# Patient Record
Sex: FEMALE | Race: WHITE | Employment: UNEMPLOYED | URBAN - METROPOLITAN AREA
[De-identification: names, ages, dates, MRNs, and addresses within clinical notes are randomized per-mention and may not be internally consistent; named-entity substitution may affect disease eponyms.]

---

## 2021-11-19 ENCOUNTER — APPOINTMENT (OUTPATIENT)
Dept: GENERAL RADIOLOGY | Age: 61
DRG: 493 | End: 2021-11-19
Payer: COMMERCIAL

## 2021-11-19 ENCOUNTER — HOSPITAL ENCOUNTER (INPATIENT)
Age: 61
LOS: 3 days | Discharge: HOME HEALTH | DRG: 493 | End: 2021-11-22
Attending: INTERNAL MEDICINE | Admitting: INTERNAL MEDICINE
Payer: COMMERCIAL

## 2021-11-19 DIAGNOSIS — S42.202A CLOSED FRACTURE OF PROXIMAL END OF LEFT HUMERUS, UNSPECIFIED FRACTURE MORPHOLOGY, INITIAL ENCOUNTER: Primary | ICD-10-CM

## 2021-11-19 PROBLEM — M25.519 SHOULDER PAIN: Status: ACTIVE | Noted: 2021-11-19

## 2021-11-19 LAB
ABO/RH: NORMAL
ANION GAP SERPL CALCULATED.3IONS-SCNC: 14 MMOL/L (ref 3–16)
ANTIBODY SCREEN: NORMAL
BACTERIA: ABNORMAL /HPF
BASOPHILS ABSOLUTE: 0.1 K/UL (ref 0–0.2)
BASOPHILS RELATIVE PERCENT: 0.3 %
BILIRUBIN URINE: ABNORMAL
BLOOD, URINE: NEGATIVE
BUN BLDV-MCNC: 16 MG/DL (ref 7–20)
CALCIUM SERPL-MCNC: 9.5 MG/DL (ref 8.3–10.6)
CHLORIDE BLD-SCNC: 97 MMOL/L (ref 99–110)
CLARITY: ABNORMAL
CO2: 22 MMOL/L (ref 21–32)
COLOR: ABNORMAL
CREAT SERPL-MCNC: 0.7 MG/DL (ref 0.6–1.2)
CRYSTALS, UA: ABNORMAL /HPF
EOSINOPHILS ABSOLUTE: 0.1 K/UL (ref 0–0.6)
EOSINOPHILS RELATIVE PERCENT: 0.3 %
EPITHELIAL CELLS, UA: 12 /HPF (ref 0–5)
FINE CASTS, UA: ABNORMAL /LPF (ref 0–2)
GFR AFRICAN AMERICAN: >60
GFR NON-AFRICAN AMERICAN: >60
GLUCOSE BLD-MCNC: 158 MG/DL (ref 70–99)
GLUCOSE BLD-MCNC: 229 MG/DL (ref 70–99)
GLUCOSE URINE: NEGATIVE MG/DL
HCT VFR BLD CALC: 48.6 % (ref 36–48)
HEMOGLOBIN: 16.4 G/DL (ref 12–16)
HYALINE CASTS: ABNORMAL /LPF (ref 0–2)
KETONES, URINE: ABNORMAL MG/DL
LEUKOCYTE ESTERASE, URINE: ABNORMAL
LYMPHOCYTES ABSOLUTE: 2.9 K/UL (ref 1–5.1)
LYMPHOCYTES RELATIVE PERCENT: 10.8 %
MCH RBC QN AUTO: 30.6 PG (ref 26–34)
MCHC RBC AUTO-ENTMCNC: 33.7 G/DL (ref 31–36)
MCV RBC AUTO: 91 FL (ref 80–100)
MICROSCOPIC EXAMINATION: YES
MONOCYTES ABSOLUTE: 1.1 K/UL (ref 0–1.3)
MONOCYTES RELATIVE PERCENT: 4.3 %
NEUTROPHILS ABSOLUTE: 22.5 K/UL (ref 1.7–7.7)
NEUTROPHILS RELATIVE PERCENT: 84.3 %
NITRITE, URINE: NEGATIVE
PDW BLD-RTO: 13.5 % (ref 12.4–15.4)
PERFORMED ON: ABNORMAL
PH UA: 5.5 (ref 5–8)
PLATELET # BLD: 399 K/UL (ref 135–450)
PMV BLD AUTO: 8 FL (ref 5–10.5)
POTASSIUM REFLEX MAGNESIUM: 4.1 MMOL/L (ref 3.5–5.1)
PROTEIN UA: 100 MG/DL
RBC # BLD: 5.34 M/UL (ref 4–5.2)
RBC UA: ABNORMAL /HPF (ref 0–4)
SARS-COV-2, NAAT: NOT DETECTED
SODIUM BLD-SCNC: 133 MMOL/L (ref 136–145)
SPECIFIC GRAVITY UA: 1.02 (ref 1–1.03)
URINE REFLEX TO CULTURE: YES
URINE TYPE: ABNORMAL
UROBILINOGEN, URINE: 1 E.U./DL
WBC # BLD: 26.6 K/UL (ref 4–11)
WBC UA: 31 /HPF (ref 0–5)

## 2021-11-19 PROCEDURE — 73060 X-RAY EXAM OF HUMERUS: CPT

## 2021-11-19 PROCEDURE — 99284 EMERGENCY DEPT VISIT MOD MDM: CPT

## 2021-11-19 PROCEDURE — 93005 ELECTROCARDIOGRAM TRACING: CPT | Performed by: PHYSICIAN ASSISTANT

## 2021-11-19 PROCEDURE — 86850 RBC ANTIBODY SCREEN: CPT

## 2021-11-19 PROCEDURE — 81001 URINALYSIS AUTO W/SCOPE: CPT

## 2021-11-19 PROCEDURE — 6360000002 HC RX W HCPCS: Performed by: INTERNAL MEDICINE

## 2021-11-19 PROCEDURE — 6370000000 HC RX 637 (ALT 250 FOR IP): Performed by: NURSE PRACTITIONER

## 2021-11-19 PROCEDURE — 71045 X-RAY EXAM CHEST 1 VIEW: CPT

## 2021-11-19 PROCEDURE — 1200000000 HC SEMI PRIVATE

## 2021-11-19 PROCEDURE — 6360000002 HC RX W HCPCS: Performed by: PHYSICIAN ASSISTANT

## 2021-11-19 PROCEDURE — 83036 HEMOGLOBIN GLYCOSYLATED A1C: CPT

## 2021-11-19 PROCEDURE — 6370000000 HC RX 637 (ALT 250 FOR IP): Performed by: PHYSICIAN ASSISTANT

## 2021-11-19 PROCEDURE — 6360000002 HC RX W HCPCS: Performed by: NURSE PRACTITIONER

## 2021-11-19 PROCEDURE — 80048 BASIC METABOLIC PNL TOTAL CA: CPT

## 2021-11-19 PROCEDURE — 96375 TX/PRO/DX INJ NEW DRUG ADDON: CPT

## 2021-11-19 PROCEDURE — 86901 BLOOD TYPING SEROLOGIC RH(D): CPT

## 2021-11-19 PROCEDURE — 87635 SARS-COV-2 COVID-19 AMP PRB: CPT

## 2021-11-19 PROCEDURE — 96374 THER/PROPH/DIAG INJ IV PUSH: CPT

## 2021-11-19 PROCEDURE — 99221 1ST HOSP IP/OBS SF/LOW 40: CPT | Performed by: NURSE PRACTITIONER

## 2021-11-19 PROCEDURE — 86900 BLOOD TYPING SEROLOGIC ABO: CPT

## 2021-11-19 PROCEDURE — 96372 THER/PROPH/DIAG INJ SC/IM: CPT

## 2021-11-19 PROCEDURE — 87086 URINE CULTURE/COLONY COUNT: CPT

## 2021-11-19 PROCEDURE — 85025 COMPLETE CBC W/AUTO DIFF WBC: CPT

## 2021-11-19 PROCEDURE — 6370000000 HC RX 637 (ALT 250 FOR IP): Performed by: INTERNAL MEDICINE

## 2021-11-19 PROCEDURE — 2580000003 HC RX 258: Performed by: INTERNAL MEDICINE

## 2021-11-19 RX ORDER — OXYCODONE HYDROCHLORIDE 10 MG/1
10 TABLET ORAL EVERY 4 HOURS PRN
Status: DISCONTINUED | OUTPATIENT
Start: 2021-11-19 | End: 2021-11-19

## 2021-11-19 RX ORDER — OXYCODONE HYDROCHLORIDE AND ACETAMINOPHEN 5; 325 MG/1; MG/1
1 TABLET ORAL ONCE
Status: DISCONTINUED | OUTPATIENT
Start: 2021-11-19 | End: 2021-11-19

## 2021-11-19 RX ORDER — SODIUM CHLORIDE 0.9 % (FLUSH) 0.9 %
10 SYRINGE (ML) INJECTION EVERY 12 HOURS SCHEDULED
Status: DISCONTINUED | OUTPATIENT
Start: 2021-11-19 | End: 2021-11-22 | Stop reason: HOSPADM

## 2021-11-19 RX ORDER — FENTANYL CITRATE 50 UG/ML
100 INJECTION, SOLUTION INTRAMUSCULAR; INTRAVENOUS ONCE
Status: COMPLETED | OUTPATIENT
Start: 2021-11-19 | End: 2021-11-19

## 2021-11-19 RX ORDER — MORPHINE SULFATE 2 MG/ML
2 INJECTION, SOLUTION INTRAMUSCULAR; INTRAVENOUS
Status: DISCONTINUED | OUTPATIENT
Start: 2021-11-19 | End: 2021-11-19 | Stop reason: SDUPTHER

## 2021-11-19 RX ORDER — OXYCODONE HYDROCHLORIDE 5 MG/1
5 TABLET ORAL EVERY 4 HOURS PRN
Status: DISCONTINUED | OUTPATIENT
Start: 2021-11-19 | End: 2021-11-22 | Stop reason: HOSPADM

## 2021-11-19 RX ORDER — DEXTROSE MONOHYDRATE 50 MG/ML
100 INJECTION, SOLUTION INTRAVENOUS PRN
Status: DISCONTINUED | OUTPATIENT
Start: 2021-11-19 | End: 2021-11-22 | Stop reason: HOSPADM

## 2021-11-19 RX ORDER — ONDANSETRON 4 MG/1
4 TABLET, ORALLY DISINTEGRATING ORAL EVERY 8 HOURS PRN
Status: DISCONTINUED | OUTPATIENT
Start: 2021-11-19 | End: 2021-11-22 | Stop reason: HOSPADM

## 2021-11-19 RX ORDER — OXYCODONE HYDROCHLORIDE 5 MG/1
5 TABLET ORAL EVERY 4 HOURS PRN
Status: DISCONTINUED | OUTPATIENT
Start: 2021-11-19 | End: 2021-11-19

## 2021-11-19 RX ORDER — LISINOPRIL AND HYDROCHLOROTHIAZIDE 20; 12.5 MG/1; MG/1
1 TABLET ORAL DAILY
COMMUNITY

## 2021-11-19 RX ORDER — ACETAMINOPHEN 650 MG/1
650 SUPPOSITORY RECTAL EVERY 6 HOURS PRN
Status: DISCONTINUED | OUTPATIENT
Start: 2021-11-19 | End: 2021-11-22 | Stop reason: HOSPADM

## 2021-11-19 RX ORDER — ATORVASTATIN CALCIUM 20 MG/1
20 TABLET, FILM COATED ORAL NIGHTLY
Status: DISCONTINUED | OUTPATIENT
Start: 2021-11-19 | End: 2021-11-22 | Stop reason: HOSPADM

## 2021-11-19 RX ORDER — SODIUM CHLORIDE 0.9 % (FLUSH) 0.9 %
10 SYRINGE (ML) INJECTION PRN
Status: DISCONTINUED | OUTPATIENT
Start: 2021-11-19 | End: 2021-11-22 | Stop reason: HOSPADM

## 2021-11-19 RX ORDER — SIMVASTATIN 40 MG
40 TABLET ORAL NIGHTLY
COMMUNITY

## 2021-11-19 RX ORDER — MAGNESIUM SULFATE IN WATER 40 MG/ML
2000 INJECTION, SOLUTION INTRAVENOUS PRN
Status: DISCONTINUED | OUTPATIENT
Start: 2021-11-19 | End: 2021-11-22 | Stop reason: HOSPADM

## 2021-11-19 RX ORDER — SODIUM CHLORIDE 9 MG/ML
25 INJECTION, SOLUTION INTRAVENOUS PRN
Status: DISCONTINUED | OUTPATIENT
Start: 2021-11-19 | End: 2021-11-22 | Stop reason: HOSPADM

## 2021-11-19 RX ORDER — LEVOTHYROXINE SODIUM 0.15 MG/1
150 TABLET ORAL EVERY OTHER DAY
COMMUNITY

## 2021-11-19 RX ORDER — LEVOTHYROXINE SODIUM 175 UG/1
175 TABLET ORAL EVERY OTHER DAY
COMMUNITY

## 2021-11-19 RX ORDER — ONDANSETRON 4 MG/1
4 TABLET, ORALLY DISINTEGRATING ORAL ONCE
Status: COMPLETED | OUTPATIENT
Start: 2021-11-19 | End: 2021-11-19

## 2021-11-19 RX ORDER — OXYCODONE HYDROCHLORIDE 10 MG/1
10 TABLET ORAL EVERY 4 HOURS PRN
Status: DISCONTINUED | OUTPATIENT
Start: 2021-11-19 | End: 2021-11-22 | Stop reason: HOSPADM

## 2021-11-19 RX ORDER — LISINOPRIL AND HYDROCHLOROTHIAZIDE 20; 12.5 MG/1; MG/1
1 TABLET ORAL DAILY
Status: DISCONTINUED | OUTPATIENT
Start: 2021-11-20 | End: 2021-11-22 | Stop reason: HOSPADM

## 2021-11-19 RX ORDER — MORPHINE SULFATE 2 MG/ML
2 INJECTION, SOLUTION INTRAMUSCULAR; INTRAVENOUS
Status: DISCONTINUED | OUTPATIENT
Start: 2021-11-19 | End: 2021-11-22

## 2021-11-19 RX ORDER — ONDANSETRON 2 MG/ML
4 INJECTION INTRAMUSCULAR; INTRAVENOUS EVERY 6 HOURS PRN
Status: DISCONTINUED | OUTPATIENT
Start: 2021-11-19 | End: 2021-11-22 | Stop reason: HOSPADM

## 2021-11-19 RX ORDER — POTASSIUM CHLORIDE 7.45 MG/ML
10 INJECTION INTRAVENOUS PRN
Status: DISCONTINUED | OUTPATIENT
Start: 2021-11-19 | End: 2021-11-22 | Stop reason: HOSPADM

## 2021-11-19 RX ORDER — LEVOTHYROXINE SODIUM 0.07 MG/1
150 TABLET ORAL EVERY OTHER DAY
Status: DISCONTINUED | OUTPATIENT
Start: 2021-11-20 | End: 2021-11-22 | Stop reason: HOSPADM

## 2021-11-19 RX ORDER — LORAZEPAM 2 MG/ML
1 INJECTION INTRAMUSCULAR ONCE
Status: COMPLETED | OUTPATIENT
Start: 2021-11-19 | End: 2021-11-19

## 2021-11-19 RX ORDER — NICOTINE POLACRILEX 4 MG
15 LOZENGE BUCCAL PRN
Status: DISCONTINUED | OUTPATIENT
Start: 2021-11-19 | End: 2021-11-22 | Stop reason: HOSPADM

## 2021-11-19 RX ORDER — ACETAMINOPHEN 325 MG/1
650 TABLET ORAL EVERY 6 HOURS PRN
Status: DISCONTINUED | OUTPATIENT
Start: 2021-11-19 | End: 2021-11-22 | Stop reason: HOSPADM

## 2021-11-19 RX ORDER — DEXTROSE MONOHYDRATE 25 G/50ML
12.5 INJECTION, SOLUTION INTRAVENOUS PRN
Status: DISCONTINUED | OUTPATIENT
Start: 2021-11-19 | End: 2021-11-22 | Stop reason: HOSPADM

## 2021-11-19 RX ADMIN — Medication 10 ML: at 21:22

## 2021-11-19 RX ADMIN — INSULIN LISPRO 1 UNITS: 100 INJECTION, SOLUTION INTRAVENOUS; SUBCUTANEOUS at 21:40

## 2021-11-19 RX ADMIN — HYDROMORPHONE HYDROCHLORIDE 1 MG: 1 INJECTION, SOLUTION INTRAMUSCULAR; INTRAVENOUS; SUBCUTANEOUS at 16:27

## 2021-11-19 RX ADMIN — HYDROMORPHONE HYDROCHLORIDE 2 MG: 1 INJECTION, SOLUTION INTRAMUSCULAR; INTRAVENOUS; SUBCUTANEOUS at 13:31

## 2021-11-19 RX ADMIN — FENTANYL CITRATE 100 MCG: 50 INJECTION INTRAMUSCULAR; INTRAVENOUS at 15:27

## 2021-11-19 RX ADMIN — LORAZEPAM 1 MG: 2 INJECTION INTRAMUSCULAR; INTRAVENOUS at 16:50

## 2021-11-19 RX ADMIN — OXYCODONE 5 MG: 5 TABLET ORAL at 22:13

## 2021-11-19 RX ADMIN — MORPHINE SULFATE 2 MG: 2 INJECTION, SOLUTION INTRAMUSCULAR; INTRAVENOUS at 23:16

## 2021-11-19 RX ADMIN — ONDANSETRON 4 MG: 4 TABLET, ORALLY DISINTEGRATING ORAL at 13:31

## 2021-11-19 RX ADMIN — ATORVASTATIN CALCIUM 20 MG: 20 TABLET, FILM COATED ORAL at 21:22

## 2021-11-19 RX ADMIN — MORPHINE SULFATE 2 MG: 2 INJECTION, SOLUTION INTRAMUSCULAR; INTRAVENOUS at 19:30

## 2021-11-19 ASSESSMENT — PAIN SCALES - GENERAL
PAINLEVEL_OUTOF10: 8
PAINLEVEL_OUTOF10: 7
PAINLEVEL_OUTOF10: 6
PAINLEVEL_OUTOF10: 0
PAINLEVEL_OUTOF10: 7
PAINLEVEL_OUTOF10: 10
PAINLEVEL_OUTOF10: 3
PAINLEVEL_OUTOF10: 7

## 2021-11-19 ASSESSMENT — PAIN DESCRIPTION - FREQUENCY
FREQUENCY: CONTINUOUS
FREQUENCY: INTERMITTENT

## 2021-11-19 ASSESSMENT — PAIN DESCRIPTION - ORIENTATION
ORIENTATION: LEFT

## 2021-11-19 ASSESSMENT — PAIN DESCRIPTION - PAIN TYPE
TYPE: ACUTE PAIN

## 2021-11-19 ASSESSMENT — PAIN - FUNCTIONAL ASSESSMENT
PAIN_FUNCTIONAL_ASSESSMENT: PREVENTS OR INTERFERES SOME ACTIVE ACTIVITIES AND ADLS
PAIN_FUNCTIONAL_ASSESSMENT: 0-10

## 2021-11-19 ASSESSMENT — PAIN DESCRIPTION - LOCATION
LOCATION: SHOULDER
LOCATION: ARM
LOCATION: SHOULDER
LOCATION: ARM

## 2021-11-19 ASSESSMENT — PAIN DESCRIPTION - PROGRESSION: CLINICAL_PROGRESSION: NOT CHANGED

## 2021-11-19 ASSESSMENT — PAIN DESCRIPTION - DESCRIPTORS
DESCRIPTORS: SHARP;SPASM
DESCRIPTORS: SHARP;SHOOTING;ACHING

## 2021-11-19 NOTE — ED PROVIDER NOTES
[facility]        Pt Name: Dasia Webb  MRN: [id number]  [place] 1960  Date of evaluation: 11/19/2021  Provider: Lovett Cooks, PA  PCP: No primary care provider on file. Note Started: 1:24 PM EST     The ED Attending Physician was available for consultation but did not see or evaluate this patient. CHIEF COMPLAINT       Chief Complaint   Patient presents with    Shoulder Injury     fell when her dog ran off; denies hitting her head       HISTORY OF PRESENT ILLNESS   (Location, Timing/Onset, Context/Setting, Quality, Duration, Modifying Factors, Severity, Associated Signs and Symptoms)  Note limiting factors. Chief Complaint: Left shoulder pain    Dasia Webb is a 64 y.o. female who presents with injury to the left shoulder area. She says she was walking her dog shortly before she came to the emergency department when the dog jerked, and she fell to the ground, landing on her left elbow and shoulder area. Denies any head trauma or loss of consciousness. She says she was in severe pain immediately, mostly in the left upper arm. Says she does not know whether she broke her collarbone or shoulder but it feels like something is broken. She denies numbness in the extremity. Denies injuries to any other parts of the body. She says it hurts to even try to move the left arm. No significant pain in the elbow or wrist.  Denies any prior history of fracture or surgery in the affected area. Nursing Notes were all reviewed and agreed with or any disagreements were addressed in the HPI. REVIEW OF SYSTEMS    (2-9 systems for level 4, 10 or more for level 5)     Review of Systems    Positives and pertinent negatives as per HPI. PAST MEDICAL HISTORY   History reviewed. No pertinent past medical history. SURGICAL HISTORY   History reviewed. No pertinent surgical history.     CURRENTMEDICATIONS       Previous Medications    LEVOTHYROXINE (SYNTHROID) 150 MCG TABLET    Take 150 mcg by mouth every other day Alternates with 175 mcg    LEVOTHYROXINE (SYNTHROID) 175 MCG TABLET    Take 175 mcg by mouth every other day Alternates with 150 mcg    LISINOPRIL-HYDROCHLOROTHIAZIDE (PRINZIDE;ZESTORETIC) 20-12.5 MG PER TABLET    Take 1 tablet by mouth daily    SEMAGLUTIDE 3 MG TABS    Take 3 mg by mouth daily    SIMVASTATIN (ZOCOR) 40 MG TABLET    Take 40 mg by mouth nightly       ALLERGIES     Cyclosporine    FAMILYHISTORY     History reviewed. No pertinent family history. SOCIAL HISTORY       Social History     Tobacco Use    Smoking status: Current Every Day Smoker    Smokeless tobacco: Never Used   Substance Use Topics    Alcohol use: Yes     Comment: occasionally     Drug use: Never       SCREENINGS           PHYSICAL EXAM    (up to 7 for level 4, 8 or more for level 5)     ED Triage Vitals [11/19/21 1246]   BP Temp Temp Source Pulse Resp SpO2 Height Weight   -- 97.5 °F (36.4 °C) Oral 81 20 93 % -- --       Physical Exam  Vitals and nursing note reviewed. Constitutional:       General: She is not in acute distress. Appearance: Normal appearance. She is not ill-appearing. HENT:      Head: Normocephalic and atraumatic. Nose: Nose normal.   Eyes:      General:         Right eye: No discharge. Left eye: No discharge. Pulmonary:      Effort: Pulmonary effort is normal. No respiratory distress. Musculoskeletal:         General: Normal range of motion. Cervical back: Normal range of motion. Comments: Presents with left arm in a sling. No tenderness directly over the left clavicle, scapula or AC joint. There is tenderness to palpation diffusely over the humerus. Palpation of the bones of the elbow resulted in pain in the upper arm. Pain reported with any attempts at range of motion to the left elbow or shoulder.   Normal examination of the left wrist and hand, with no bony tenderness there, good range of motion to the joints. 2+ radial pulse on the left. Sensation to light touch grossly intact and capillary refill <3 seconds in the digits of the left upper extremity. Skin:     General: Skin is warm and dry. Neurological:      General: No focal deficit present. Mental Status: She is alert and oriented to person, place, and time.    Psychiatric:         Mood and Affect: Mood normal.         Behavior: Behavior normal.         DIAGNOSTIC RESULTS   LABS:    Labs Reviewed   CBC WITH AUTO DIFFERENTIAL - Abnormal; Notable for the following components:       Result Value    WBC 26.6 (*)     RBC 5.34 (*)     Hemoglobin 16.4 (*)     Hematocrit 48.6 (*)     Neutrophils Absolute 22.5 (*)     All other components within normal limits    Narrative:     Performed at:  [address] [place]   Phone [phone]   BASIC METABOLIC PANEL W/ REFLEX TO MG FOR LOW K - Abnormal; Notable for the following components:    Sodium 133 (*)     Chloride 97 (*)     Glucose 229 (*)     All other components within normal limits    Narrative:     Performed at:  [address] [address]   Phone [phone]   URINE RT REFLEX TO CULTURE - Abnormal; Notable for the following components:    Clarity, UA CLOUDY (*)     Bilirubin Urine SMALL (*)     Ketones, Urine TRACE (*)     Protein,  (*)     Leukocyte Esterase, Urine SMALL (*)     All other components within normal limits    Narrative:     Performed at:  [address] [address]   Phone [phone] RAPID    Narrative:     Performed at:  [address] [place]   Phone [phone]   MICROSCOPIC URINALYSIS   TYPE AND SCREEN    Narrative:     Performed at:  [address] [place] [address]  [address] [facility] ([facility] Benjy Galaviz [address]   Phone [phone]       When ordered only abnormal lab results are displayed. All other labs were within normal range or not returned as of this dictation. EKG: When ordered, EKG's are interpreted by the Emergency Department Physician in the absence of a cardiologist.  Please see their note for interpretation of EKG. RADIOLOGY:   Non-plain film images such as CT, Ultrasound and MRI are read by the radiologist. Plain radiographic images are visualized and preliminarily interpreted by the ED Provider with the below findings:    Interpretation per the Radiologist below, if available at the time of this note:    XR CHEST PORTABLE   Final Result   Exam limited by body habitus. Possible small pleural effusions. PA and   lateral chest radiograph could be performed for further assessment as   warranted. XR HUMERUS LEFT (MIN 2 VIEWS)   Final Result   Comminuted fracture of the proximal left humeral shaft as well as   glenohumeral dislocation. CONSULTS:  [address] [place] [address]   Unless otherwise noted below, none.      Procedures    EMERGENCY DEPARTMENT COURSE and DIFFERENTIAL DIAGNOSIS/MDM:   Vitals:    Vitals:    11/19/21 1246 11/19/21 1410 11/19/21 1555 11/19/21 1901   BP:  (!) 148/94  138/81   Pulse: 81 80  80   Resp: 20 20  16   Temp: 97.5 °F (36.4 °C) 97.8 °F (36.6 °C)  97.9 °F (36.6 °C)   TempSrc: Oral Oral  Oral   SpO2: 93% 95%  95%   Weight:   259 lb (117.5 kg)    Height:   5' 1.5\" (1.562 m)        Patient was given the following medications:  Medications   morphine (PF) injection 2 mg (2 mg IntraVENous Given 11/19/21 1930)   ondansetron (ZOFRAN-ODT) disintegrating tablet 4 mg (4 mg Oral Given 11/19/21 1331)   HYDROmorphone (DILAUDID) injection 2 mg (2 mg IntraMUSCular Given 11/19/21 1331)   fentaNYL (SUBLIMAZE) injection 100 mcg (100 mcg IntraVENous Given 11/19/21 1527)   HYDROmorphone (DILAUDID) injection 1 mg (1 mg IntraVENous Given 11/19/21 1627)   LORazepam (ATIVAN) injection 1 mg (1 mg IntraVENous Given 11/19/21 1650)           Patient had good neurovascular status in the arm, but x-ray revealed a badly angulated and displaced fracture of the proximal humerus, with dislocation of the humeral head from the joint. I consulted orthopedics, the patient was visited by the orthopedic NP Florentino velazquez, who consulted with the orthopedic physician on-call. At his recommendation, an attempt at reduction of the fracture fragment was made, and the patient reported some improvement in pain after this. She was given pain medication in the ED. She is given a sling and swath. I consulted the hospitalist, who agreed to accept the patient, with surgery planned for tomorrow morning. The patient verbalized understanding and agreement with this plan of care. CRITICAL CARE TIME   CRITICAL CARE: There was a high probability of clinically significant/life threatening deterioration in this patient's condition which required my urgent intervention. Total critical care time was 20 minutes. This excludes any time for separately reportable procedures. FINAL IMPRESSION      1. Closed fracture of proximal end of left humerus, unspecified fracture morphology, initial encounter          DISPOSITION/PLAN   DISPOSITION Admitted 11/19/2021 05:36:12 PM      PATIENT REFERRED TO:  No follow-up provider specified.     DISCHARGE MEDICATIONS:  New Prescriptions    No medications on file       DISCONTINUED MEDICATIONS:  Discontinued Medications    No medications on file            (Please note that portions of this note were completed with a voice recognition program.  Efforts were made to edit the dictations but occasionally words are mis-transcribed.)    JONATHAN Lima (electronically signed)       Madie Lima  11/19/21 1935

## 2021-11-19 NOTE — ED NOTES
Patient sitting upright on cot. Patient unable to find a comfortable position. Sling was placed by provider. Patient reports \"loosening the straps because it hurt more with the sling. \" Daughter at bedside. Ice pack placed on left upper arm for patient comfort.       Galo Nagy RN  11/19/21 9223

## 2021-11-19 NOTE — PROGRESS NOTES
Medication Reconciliation    List of medications for Cartagena Schillings is currently taking is complete.      Source of Information:   Epic records  Conversation with patient, spouse, and pharmacy [facility], [phone]) at bedside and by phone     Allergies  Allergy list not thoroughly reviewed with patient at this time  Allergies listed in Epic as follows: Cyclosporine     Current Medications:    Current Facility-Administered Medications:     morphine (PF) injection 2 mg, 2 mg, IntraVENous, Q3H PRN, Parisa Glez MD    Current Outpatient Medications:     simvastatin (ZOCOR) 40 MG tablet, Take 40 mg by mouth nightly, Disp: , Rfl:     levothyroxine (SYNTHROID) 175 MCG tablet, Take 175 mcg by mouth every other day Alternates with 150 mcg, Disp: , Rfl:     levothyroxine (SYNTHROID) 150 MCG tablet, Take 150 mcg by mouth every other day Alternates with 175 mcg, Disp: , Rfl:     lisinopril-hydroCHLOROthiazide (PRINZIDE;ZESTORETIC) 20-12.5 MG per tablet, Take 1 tablet by mouth daily, Disp: , Rfl:     Semaglutide 3 MG TABS, Take 3 mg by mouth daily, Disp: , Rfl:     Notes Regarding Home Medications:   Patient received all of their home medications prior to arrival to the emergency department      Shelby Sweeney PharmD Candidate   11/19/2021 5:55 PM

## 2021-11-19 NOTE — CONSULTS
[facility] Orthopedic Surgery  Consult Note    This patient is seen in consultation at the request of Toma CASTILLO    Reason for Consult:  Left proximal humerus fx with dislocation    CHIEF COMPLAINT:  Left shoulder pain    History Obtained From:  patient, electronic medical record    HISTORY OF PRESENT ILLNESS:    The patient is a 64 y.o. female who presents with left shoulder pain. She was walking her young [place] dog this morning the she tripped on the curb when the dog pulled on her. She fell onto the left arm. She yelled and her  came outside and then called EMS due to severe left arm pain. Pain is described in left shoulder and upper arm and with the intensity of severe. Pain is described as aching, shooting. Discomfort is constant. She is alert and oriented. No other complaints. She states she ate breakfast this AM then nothing until about 30 min ago she ate a [employer] bar because she is diabetic.  at bedside. Past Medical History:    History reviewed. No pertinent past medical history. Past Surgical History:    History reviewed. No pertinent surgical history. Social History     Tobacco Use    Smoking status: Current Every Day Smoker    Smokeless tobacco: Never Used   Substance Use Topics    Alcohol use: Yes     Comment: occasionally        History reviewed. No pertinent family history. Current Medications:   Current Facility-Administered Medications: fentaNYL (SUBLIMAZE) injection 100 mcg, 100 mcg, IntraVENous, Once  Allergies:  @    REVIEW OF SYSTEMS:    CONSTITUTIONAL:  negative for  fevers, chills and malaise  MUSCULOSKELETAL:  positive for  myalgias, arthralgias and pain  All other ROS reviewed in chart or with patient or family and are grossly negative.          PHYSICAL EXAM:    VITALS:  BP (!) 148/94   Pulse 80   Temp 97.8 °F (36.6 °C) (Oral)   Resp 20   SpO2 95%     MUSCULOSKELETAL:  Left wrist with intact flex/ext and hand grasp and extension and thumbs up

## 2021-11-20 ENCOUNTER — ANESTHESIA (OUTPATIENT)
Dept: OPERATING ROOM | Age: 61
DRG: 493 | End: 2021-11-20
Payer: COMMERCIAL

## 2021-11-20 ENCOUNTER — ANESTHESIA EVENT (OUTPATIENT)
Dept: OPERATING ROOM | Age: 61
DRG: 493 | End: 2021-11-20
Payer: COMMERCIAL

## 2021-11-20 ENCOUNTER — APPOINTMENT (OUTPATIENT)
Dept: GENERAL RADIOLOGY | Age: 61
DRG: 493 | End: 2021-11-20
Payer: COMMERCIAL

## 2021-11-20 VITALS
OXYGEN SATURATION: 97 % | RESPIRATION RATE: 10 BRPM | DIASTOLIC BLOOD PRESSURE: 66 MMHG | SYSTOLIC BLOOD PRESSURE: 160 MMHG | TEMPERATURE: 97.2 F

## 2021-11-20 LAB
ANION GAP SERPL CALCULATED.3IONS-SCNC: 15 MMOL/L (ref 3–16)
BUN BLDV-MCNC: 18 MG/DL (ref 7–20)
CALCIUM SERPL-MCNC: 9.1 MG/DL (ref 8.3–10.6)
CHLORIDE BLD-SCNC: 100 MMOL/L (ref 99–110)
CO2: 21 MMOL/L (ref 21–32)
CREAT SERPL-MCNC: 0.6 MG/DL (ref 0.6–1.2)
EKG ATRIAL RATE: 77 BPM
EKG DIAGNOSIS: NORMAL
EKG P AXIS: 52 DEGREES
EKG P-R INTERVAL: 158 MS
EKG Q-T INTERVAL: 334 MS
EKG QRS DURATION: 80 MS
EKG QTC CALCULATION (BAZETT): 377 MS
EKG R AXIS: -1 DEGREES
EKG T AXIS: 13 DEGREES
EKG VENTRICULAR RATE: 77 BPM
ESTIMATED AVERAGE GLUCOSE: 159.9 MG/DL
GFR AFRICAN AMERICAN: >60
GFR NON-AFRICAN AMERICAN: >60
GLUCOSE BLD-MCNC: 117 MG/DL (ref 70–99)
GLUCOSE BLD-MCNC: 208 MG/DL (ref 70–99)
GLUCOSE BLD-MCNC: 234 MG/DL (ref 70–99)
GLUCOSE BLD-MCNC: 245 MG/DL (ref 70–99)
HBA1C MFR BLD: 7.2 %
HCT VFR BLD CALC: 48 % (ref 36–48)
HEMOGLOBIN: 15.6 G/DL (ref 12–16)
MCH RBC QN AUTO: 29.9 PG (ref 26–34)
MCHC RBC AUTO-ENTMCNC: 32.5 G/DL (ref 31–36)
MCV RBC AUTO: 92 FL (ref 80–100)
PDW BLD-RTO: 13.8 % (ref 12.4–15.4)
PERFORMED ON: ABNORMAL
PLATELET # BLD: 352 K/UL (ref 135–450)
PMV BLD AUTO: 8 FL (ref 5–10.5)
POTASSIUM REFLEX MAGNESIUM: 4.1 MMOL/L (ref 3.5–5.1)
RBC # BLD: 5.21 M/UL (ref 4–5.2)
SODIUM BLD-SCNC: 136 MMOL/L (ref 136–145)
WBC # BLD: 16.8 K/UL (ref 4–11)

## 2021-11-20 PROCEDURE — 6360000002 HC RX W HCPCS: Performed by: ANESTHESIOLOGY

## 2021-11-20 PROCEDURE — 85027 COMPLETE CBC AUTOMATED: CPT

## 2021-11-20 PROCEDURE — 2500000003 HC RX 250 WO HCPCS: Performed by: ORTHOPAEDIC SURGERY

## 2021-11-20 PROCEDURE — 2580000003 HC RX 258: Performed by: NURSE PRACTITIONER

## 2021-11-20 PROCEDURE — 97166 OT EVAL MOD COMPLEX 45 MIN: CPT

## 2021-11-20 PROCEDURE — 6370000000 HC RX 637 (ALT 250 FOR IP): Performed by: INTERNAL MEDICINE

## 2021-11-20 PROCEDURE — 2709999900 HC NON-CHARGEABLE SUPPLY: Performed by: ORTHOPAEDIC SURGERY

## 2021-11-20 PROCEDURE — 97116 GAIT TRAINING THERAPY: CPT

## 2021-11-20 PROCEDURE — 3209999900 FLUORO FOR SURGICAL PROCEDURES

## 2021-11-20 PROCEDURE — 6360000002 HC RX W HCPCS: Performed by: NURSE PRACTITIONER

## 2021-11-20 PROCEDURE — 97530 THERAPEUTIC ACTIVITIES: CPT

## 2021-11-20 PROCEDURE — 36415 COLL VENOUS BLD VENIPUNCTURE: CPT

## 2021-11-20 PROCEDURE — L3660 SO 8 AB RSTR CAN/WEB PRE OTS: HCPCS | Performed by: ORTHOPAEDIC SURGERY

## 2021-11-20 PROCEDURE — 6370000000 HC RX 637 (ALT 250 FOR IP): Performed by: ORTHOPAEDIC SURGERY

## 2021-11-20 PROCEDURE — 6360000002 HC RX W HCPCS: Performed by: ORTHOPAEDIC SURGERY

## 2021-11-20 PROCEDURE — 2580000003 HC RX 258: Performed by: INTERNAL MEDICINE

## 2021-11-20 PROCEDURE — 2700000000 HC OXYGEN THERAPY PER DAY

## 2021-11-20 PROCEDURE — 73060 X-RAY EXAM OF HUMERUS: CPT

## 2021-11-20 PROCEDURE — 0PSD04Z REPOSITION LEFT HUMERAL HEAD WITH INTERNAL FIXATION DEVICE, OPEN APPROACH: ICD-10-PCS | Performed by: ORTHOPAEDIC SURGERY

## 2021-11-20 PROCEDURE — 7100000000 HC PACU RECOVERY - FIRST 15 MIN: Performed by: ORTHOPAEDIC SURGERY

## 2021-11-20 PROCEDURE — 94760 N-INVAS EAR/PLS OXIMETRY 1: CPT

## 2021-11-20 PROCEDURE — 1200000000 HC SEMI PRIVATE

## 2021-11-20 PROCEDURE — 3700000001 HC ADD 15 MINUTES (ANESTHESIA): Performed by: ORTHOPAEDIC SURGERY

## 2021-11-20 PROCEDURE — 3600000014 HC SURGERY LEVEL 4 ADDTL 15MIN: Performed by: ORTHOPAEDIC SURGERY

## 2021-11-20 PROCEDURE — 97535 SELF CARE MNGMENT TRAINING: CPT

## 2021-11-20 PROCEDURE — 2720000010 HC SURG SUPPLY STERILE: Performed by: ORTHOPAEDIC SURGERY

## 2021-11-20 PROCEDURE — 93010 ELECTROCARDIOGRAM REPORT: CPT | Performed by: INTERNAL MEDICINE

## 2021-11-20 PROCEDURE — 2580000003 HC RX 258: Performed by: ORTHOPAEDIC SURGERY

## 2021-11-20 PROCEDURE — 7100000001 HC PACU RECOVERY - ADDTL 15 MIN: Performed by: ORTHOPAEDIC SURGERY

## 2021-11-20 PROCEDURE — 97162 PT EVAL MOD COMPLEX 30 MIN: CPT

## 2021-11-20 PROCEDURE — 2500000003 HC RX 250 WO HCPCS: Performed by: ANESTHESIOLOGY

## 2021-11-20 PROCEDURE — 94150 VITAL CAPACITY TEST: CPT

## 2021-11-20 PROCEDURE — 3600000004 HC SURGERY LEVEL 4 BASE: Performed by: ORTHOPAEDIC SURGERY

## 2021-11-20 PROCEDURE — C1713 ANCHOR/SCREW BN/BN,TIS/BN: HCPCS | Performed by: ORTHOPAEDIC SURGERY

## 2021-11-20 PROCEDURE — 80048 BASIC METABOLIC PNL TOTAL CA: CPT

## 2021-11-20 PROCEDURE — 23680 OPTX SHO DISLC NECK FX FIXJ: CPT | Performed by: ORTHOPAEDIC SURGERY

## 2021-11-20 PROCEDURE — 9990000010 HC NO CHARGE VISIT

## 2021-11-20 PROCEDURE — 3700000000 HC ANESTHESIA ATTENDED CARE: Performed by: ORTHOPAEDIC SURGERY

## 2021-11-20 DEVICE — SCREW BNE L45MM DIA3.5MM CORT S STL ST LOK FULL THRD: Type: IMPLANTABLE DEVICE | Site: ARM | Status: FUNCTIONAL

## 2021-11-20 DEVICE — IMPLANTABLE DEVICE: Type: IMPLANTABLE DEVICE | Site: ARM | Status: FUNCTIONAL

## 2021-11-20 DEVICE — SCREW BNE L22MM DIA3.5MM CORT S STL ST NONCANNULATED LOK: Type: IMPLANTABLE DEVICE | Site: ARM | Status: FUNCTIONAL

## 2021-11-20 DEVICE — SCREW BNE L40MM DIA3.5MM CORT S STL ST LOK FULL THRD: Type: IMPLANTABLE DEVICE | Site: ARM | Status: FUNCTIONAL

## 2021-11-20 DEVICE — SCREW BNE L26MM DIA3.5MM CORT S STL ST NONCANNULATED LOK: Type: IMPLANTABLE DEVICE | Site: ARM | Status: FUNCTIONAL

## 2021-11-20 RX ORDER — SODIUM CHLORIDE 0.9 % (FLUSH) 0.9 %
5-40 SYRINGE (ML) INJECTION PRN
Status: DISCONTINUED | OUTPATIENT
Start: 2021-11-20 | End: 2021-11-22 | Stop reason: HOSPADM

## 2021-11-20 RX ORDER — DEXAMETHASONE SODIUM PHOSPHATE 4 MG/ML
INJECTION, SOLUTION INTRA-ARTICULAR; INTRALESIONAL; INTRAMUSCULAR; INTRAVENOUS; SOFT TISSUE PRN
Status: DISCONTINUED | OUTPATIENT
Start: 2021-11-20 | End: 2021-11-20 | Stop reason: SDUPTHER

## 2021-11-20 RX ORDER — PROPOFOL 10 MG/ML
INJECTION, EMULSION INTRAVENOUS PRN
Status: DISCONTINUED | OUTPATIENT
Start: 2021-11-20 | End: 2021-11-20 | Stop reason: SDUPTHER

## 2021-11-20 RX ORDER — LABETALOL HYDROCHLORIDE 5 MG/ML
5 INJECTION, SOLUTION INTRAVENOUS EVERY 10 MIN PRN
Status: DISCONTINUED | OUTPATIENT
Start: 2021-11-20 | End: 2021-11-20 | Stop reason: HOSPADM

## 2021-11-20 RX ORDER — PROMETHAZINE HYDROCHLORIDE 25 MG/ML
6.25 INJECTION, SOLUTION INTRAMUSCULAR; INTRAVENOUS
Status: DISCONTINUED | OUTPATIENT
Start: 2021-11-20 | End: 2021-11-20 | Stop reason: HOSPADM

## 2021-11-20 RX ORDER — SODIUM CHLORIDE 0.9 % (FLUSH) 0.9 %
5-40 SYRINGE (ML) INJECTION PRN
Status: DISCONTINUED | OUTPATIENT
Start: 2021-11-20 | End: 2021-11-20 | Stop reason: HOSPADM

## 2021-11-20 RX ORDER — SODIUM CHLORIDE 9 MG/ML
25 INJECTION, SOLUTION INTRAVENOUS PRN
Status: DISCONTINUED | OUTPATIENT
Start: 2021-11-20 | End: 2021-11-22 | Stop reason: HOSPADM

## 2021-11-20 RX ORDER — MAGNESIUM HYDROXIDE 1200 MG/15ML
LIQUID ORAL CONTINUOUS PRN
Status: COMPLETED | OUTPATIENT
Start: 2021-11-20 | End: 2021-11-20

## 2021-11-20 RX ORDER — SODIUM CHLORIDE 0.9 % (FLUSH) 0.9 %
5-40 SYRINGE (ML) INJECTION EVERY 12 HOURS SCHEDULED
Status: DISCONTINUED | OUTPATIENT
Start: 2021-11-20 | End: 2021-11-20 | Stop reason: HOSPADM

## 2021-11-20 RX ORDER — EPHEDRINE SULFATE/0.9% NACL/PF 50 MG/5 ML
SYRINGE (ML) INTRAVENOUS PRN
Status: DISCONTINUED | OUTPATIENT
Start: 2021-11-20 | End: 2021-11-20 | Stop reason: SDUPTHER

## 2021-11-20 RX ORDER — ROCURONIUM BROMIDE 10 MG/ML
INJECTION, SOLUTION INTRAVENOUS PRN
Status: DISCONTINUED | OUTPATIENT
Start: 2021-11-20 | End: 2021-11-20 | Stop reason: SDUPTHER

## 2021-11-20 RX ORDER — SODIUM CHLORIDE 9 MG/ML
25 INJECTION, SOLUTION INTRAVENOUS PRN
Status: DISCONTINUED | OUTPATIENT
Start: 2021-11-20 | End: 2021-11-20 | Stop reason: HOSPADM

## 2021-11-20 RX ORDER — BUPIVACAINE HYDROCHLORIDE 5 MG/ML
INJECTION, SOLUTION EPIDURAL; INTRACAUDAL
Status: COMPLETED | OUTPATIENT
Start: 2021-11-20 | End: 2021-11-20

## 2021-11-20 RX ORDER — LIDOCAINE HYDROCHLORIDE 20 MG/ML
INJECTION, SOLUTION EPIDURAL; INFILTRATION; INTRACAUDAL; PERINEURAL PRN
Status: DISCONTINUED | OUTPATIENT
Start: 2021-11-20 | End: 2021-11-20 | Stop reason: SDUPTHER

## 2021-11-20 RX ORDER — FENTANYL CITRATE 50 UG/ML
25 INJECTION, SOLUTION INTRAMUSCULAR; INTRAVENOUS EVERY 5 MIN PRN
Status: DISCONTINUED | OUTPATIENT
Start: 2021-11-20 | End: 2021-11-20 | Stop reason: HOSPADM

## 2021-11-20 RX ORDER — SODIUM CHLORIDE 0.9 % (FLUSH) 0.9 %
5-40 SYRINGE (ML) INJECTION EVERY 12 HOURS SCHEDULED
Status: DISCONTINUED | OUTPATIENT
Start: 2021-11-20 | End: 2021-11-22 | Stop reason: HOSPADM

## 2021-11-20 RX ORDER — ONDANSETRON 2 MG/ML
INJECTION INTRAMUSCULAR; INTRAVENOUS PRN
Status: DISCONTINUED | OUTPATIENT
Start: 2021-11-20 | End: 2021-11-20 | Stop reason: SDUPTHER

## 2021-11-20 RX ORDER — FENTANYL CITRATE 50 UG/ML
INJECTION, SOLUTION INTRAMUSCULAR; INTRAVENOUS PRN
Status: DISCONTINUED | OUTPATIENT
Start: 2021-11-20 | End: 2021-11-20 | Stop reason: SDUPTHER

## 2021-11-20 RX ORDER — CEFAZOLIN SODIUM 1 G/3ML
INJECTION, POWDER, FOR SOLUTION INTRAMUSCULAR; INTRAVENOUS PRN
Status: DISCONTINUED | OUTPATIENT
Start: 2021-11-20 | End: 2021-11-20 | Stop reason: SDUPTHER

## 2021-11-20 RX ORDER — SUCCINYLCHOLINE/SOD CL,ISO/PF 200MG/10ML
SYRINGE (ML) INTRAVENOUS PRN
Status: DISCONTINUED | OUTPATIENT
Start: 2021-11-20 | End: 2021-11-20 | Stop reason: SDUPTHER

## 2021-11-20 RX ORDER — PHENYLEPHRINE HCL IN 0.9% NACL 1 MG/10 ML
SYRINGE (ML) INTRAVENOUS PRN
Status: DISCONTINUED | OUTPATIENT
Start: 2021-11-20 | End: 2021-11-20 | Stop reason: SDUPTHER

## 2021-11-20 RX ADMIN — INSULIN LISPRO 4 UNITS: 100 INJECTION, SOLUTION INTRAVENOUS; SUBCUTANEOUS at 16:28

## 2021-11-20 RX ADMIN — Medication 160 MG: at 08:10

## 2021-11-20 RX ADMIN — MORPHINE SULFATE 2 MG: 2 INJECTION, SOLUTION INTRAMUSCULAR; INTRAVENOUS at 05:28

## 2021-11-20 RX ADMIN — INSULIN LISPRO 2 UNITS: 100 INJECTION, SOLUTION INTRAVENOUS; SUBCUTANEOUS at 22:44

## 2021-11-20 RX ADMIN — CEFAZOLIN 2000 MG: 10 INJECTION, POWDER, FOR SOLUTION INTRAVENOUS at 08:22

## 2021-11-20 RX ADMIN — METHOCARBAMOL 500 MG: 100 INJECTION, SOLUTION INTRAMUSCULAR; INTRAVENOUS at 15:40

## 2021-11-20 RX ADMIN — CEFAZOLIN SODIUM 1000 MG: 1 INJECTION, POWDER, FOR SOLUTION INTRAMUSCULAR; INTRAVENOUS at 08:35

## 2021-11-20 RX ADMIN — Medication 100 MCG: at 08:31

## 2021-11-20 RX ADMIN — OXYCODONE HYDROCHLORIDE 10 MG: 10 TABLET ORAL at 18:18

## 2021-11-20 RX ADMIN — PROPOFOL 180 MG: 10 INJECTION, EMULSION INTRAVENOUS at 08:10

## 2021-11-20 RX ADMIN — Medication 100 MCG: at 09:27

## 2021-11-20 RX ADMIN — Medication 100 MCG: at 09:45

## 2021-11-20 RX ADMIN — INSULIN LISPRO 4 UNITS: 100 INJECTION, SOLUTION INTRAVENOUS; SUBCUTANEOUS at 12:14

## 2021-11-20 RX ADMIN — ROCURONIUM BROMIDE 30 MG: 10 INJECTION INTRAVENOUS at 08:26

## 2021-11-20 RX ADMIN — LEVOTHYROXINE SODIUM 150 MCG: 0.07 TABLET ORAL at 05:28

## 2021-11-20 RX ADMIN — MORPHINE SULFATE 2 MG: 2 INJECTION, SOLUTION INTRAMUSCULAR; INTRAVENOUS at 02:49

## 2021-11-20 RX ADMIN — DEXAMETHASONE SODIUM PHOSPHATE 10 MG: 4 INJECTION, SOLUTION INTRAMUSCULAR; INTRAVENOUS at 08:10

## 2021-11-20 RX ADMIN — SODIUM CHLORIDE 25 ML: 9 INJECTION, SOLUTION INTRAVENOUS at 00:41

## 2021-11-20 RX ADMIN — HYDROMORPHONE HYDROCHLORIDE 0.5 MG: 1 INJECTION, SOLUTION INTRAMUSCULAR; INTRAVENOUS; SUBCUTANEOUS at 10:37

## 2021-11-20 RX ADMIN — METHOCARBAMOL 500 MG: 100 INJECTION, SOLUTION INTRAMUSCULAR; INTRAVENOUS at 00:43

## 2021-11-20 RX ADMIN — OXYCODONE HYDROCHLORIDE 10 MG: 10 TABLET ORAL at 23:38

## 2021-11-20 RX ADMIN — FENTANYL CITRATE 100 MCG: 50 INJECTION INTRAMUSCULAR; INTRAVENOUS at 08:10

## 2021-11-20 RX ADMIN — ATORVASTATIN CALCIUM 20 MG: 20 TABLET, FILM COATED ORAL at 20:48

## 2021-11-20 RX ADMIN — Medication 100 MCG: at 08:15

## 2021-11-20 RX ADMIN — ONDANSETRON 4 MG: 2 INJECTION INTRAMUSCULAR; INTRAVENOUS at 08:10

## 2021-11-20 RX ADMIN — Medication 100 MCG: at 09:11

## 2021-11-20 RX ADMIN — HYDROMORPHONE HYDROCHLORIDE 0.5 MG: 1 INJECTION, SOLUTION INTRAMUSCULAR; INTRAVENOUS; SUBCUTANEOUS at 08:44

## 2021-11-20 RX ADMIN — LIDOCAINE HYDROCHLORIDE 100 MG: 20 INJECTION, SOLUTION EPIDURAL; INFILTRATION; INTRACAUDAL; PERINEURAL at 08:10

## 2021-11-20 RX ADMIN — ROCURONIUM BROMIDE 20 MG: 10 INJECTION INTRAVENOUS at 08:46

## 2021-11-20 RX ADMIN — MORPHINE SULFATE 2 MG: 2 INJECTION, SOLUTION INTRAMUSCULAR; INTRAVENOUS at 15:07

## 2021-11-20 RX ADMIN — Medication 10 MG: at 09:01

## 2021-11-20 RX ADMIN — Medication 100 MCG: at 09:34

## 2021-11-20 RX ADMIN — CEFAZOLIN 2000 MG: 10 INJECTION, POWDER, FOR SOLUTION INTRAVENOUS at 16:28

## 2021-11-20 RX ADMIN — METHOCARBAMOL 500 MG: 100 INJECTION, SOLUTION INTRAMUSCULAR; INTRAVENOUS at 23:42

## 2021-11-20 RX ADMIN — Medication 20 MG: at 08:34

## 2021-11-20 RX ADMIN — SUGAMMADEX 200 MG: 100 INJECTION, SOLUTION INTRAVENOUS at 10:37

## 2021-11-20 RX ADMIN — MORPHINE SULFATE 2 MG: 2 INJECTION, SOLUTION INTRAMUSCULAR; INTRAVENOUS at 11:43

## 2021-11-20 ASSESSMENT — PULMONARY FUNCTION TESTS
PIF_VALUE: 24
PIF_VALUE: 15
PIF_VALUE: 29
PIF_VALUE: 24
PIF_VALUE: 10
PIF_VALUE: 25
PIF_VALUE: 25
PIF_VALUE: 9
PIF_VALUE: 16
PIF_VALUE: 21
PIF_VALUE: 12
PIF_VALUE: 25
PIF_VALUE: 24
PIF_VALUE: 0
PIF_VALUE: 25
PIF_VALUE: 6
PIF_VALUE: 25
PIF_VALUE: 25
PIF_VALUE: 21
PIF_VALUE: 24
PIF_VALUE: 23
PIF_VALUE: 13
PIF_VALUE: 16
PIF_VALUE: 21
PIF_VALUE: 20
PIF_VALUE: 24
PIF_VALUE: 20
PIF_VALUE: 21
PIF_VALUE: 24
PIF_VALUE: 1
PIF_VALUE: 25
PIF_VALUE: 13
PIF_VALUE: 25
PIF_VALUE: 24
PIF_VALUE: 25
PIF_VALUE: 26
PIF_VALUE: 22
PIF_VALUE: 21
PIF_VALUE: 25
PIF_VALUE: 24
PIF_VALUE: 24
PIF_VALUE: 25
PIF_VALUE: 25
PIF_VALUE: 24
PIF_VALUE: 0
PIF_VALUE: 24
PIF_VALUE: 25
PIF_VALUE: 24
PIF_VALUE: 24
PIF_VALUE: 6
PIF_VALUE: 8
PIF_VALUE: 24
PIF_VALUE: 20
PIF_VALUE: 13
PIF_VALUE: 24
PIF_VALUE: 26
PIF_VALUE: 25
PIF_VALUE: 25
PIF_VALUE: 26
PIF_VALUE: 25
PIF_VALUE: 24
PIF_VALUE: 24
PIF_VALUE: 25
PIF_VALUE: 25
PIF_VALUE: 20
PIF_VALUE: 16
PIF_VALUE: 24
PIF_VALUE: 16
PIF_VALUE: 0
PIF_VALUE: 25
PIF_VALUE: 24
PIF_VALUE: 16
PIF_VALUE: 24
PIF_VALUE: 46
PIF_VALUE: 24
PIF_VALUE: 25
PIF_VALUE: 24
PIF_VALUE: 27
PIF_VALUE: 25
PIF_VALUE: 25
PIF_VALUE: 21
PIF_VALUE: 24
PIF_VALUE: 22
PIF_VALUE: 24
PIF_VALUE: 24
PIF_VALUE: 0
PIF_VALUE: 24
PIF_VALUE: 1
PIF_VALUE: 24
PIF_VALUE: 1
PIF_VALUE: 25
PIF_VALUE: 24
PIF_VALUE: 24
PIF_VALUE: 0
PIF_VALUE: 22
PIF_VALUE: 25
PIF_VALUE: 16
PIF_VALUE: 35
PIF_VALUE: 24
PIF_VALUE: 28
PIF_VALUE: 24
PIF_VALUE: 24
PIF_VALUE: 35
PIF_VALUE: 24
PIF_VALUE: 22
PIF_VALUE: 25
PIF_VALUE: 21
PIF_VALUE: 24
PIF_VALUE: 24
PIF_VALUE: 25
PIF_VALUE: 24
PIF_VALUE: 14
PIF_VALUE: 26
PIF_VALUE: 20
PIF_VALUE: 24
PIF_VALUE: 22
PIF_VALUE: 24
PIF_VALUE: 25
PIF_VALUE: 24
PIF_VALUE: 17
PIF_VALUE: 24
PIF_VALUE: 0
PIF_VALUE: 21
PIF_VALUE: 1
PIF_VALUE: 13
PIF_VALUE: 24
PIF_VALUE: 16
PIF_VALUE: 24
PIF_VALUE: 20
PIF_VALUE: 0
PIF_VALUE: 2
PIF_VALUE: 24
PIF_VALUE: 3
PIF_VALUE: 24
PIF_VALUE: 25
PIF_VALUE: 25
PIF_VALUE: 3
PIF_VALUE: 24
PIF_VALUE: 1
PIF_VALUE: 20
PIF_VALUE: 23
PIF_VALUE: 25
PIF_VALUE: 24
PIF_VALUE: 24
PIF_VALUE: 26
PIF_VALUE: 17

## 2021-11-20 ASSESSMENT — PAIN DESCRIPTION - DESCRIPTORS
DESCRIPTORS: SPASM;SHARP
DESCRIPTORS: BURNING
DESCRIPTORS: DISCOMFORT
DESCRIPTORS: DISCOMFORT
DESCRIPTORS: BURNING
DESCRIPTORS: BURNING

## 2021-11-20 ASSESSMENT — PAIN DESCRIPTION - ORIENTATION
ORIENTATION: LEFT

## 2021-11-20 ASSESSMENT — PAIN DESCRIPTION - PAIN TYPE
TYPE: SURGICAL PAIN
TYPE: ACUTE PAIN
TYPE: SURGICAL PAIN
TYPE: SURGICAL PAIN

## 2021-11-20 ASSESSMENT — PAIN DESCRIPTION - PROGRESSION
CLINICAL_PROGRESSION: NOT CHANGED

## 2021-11-20 ASSESSMENT — PAIN DESCRIPTION - LOCATION
LOCATION: ARM
LOCATION: SHOULDER

## 2021-11-20 ASSESSMENT — PAIN - FUNCTIONAL ASSESSMENT

## 2021-11-20 ASSESSMENT — PAIN DESCRIPTION - ONSET
ONSET: ON-GOING

## 2021-11-20 ASSESSMENT — PAIN DESCRIPTION - FREQUENCY
FREQUENCY: CONTINUOUS
FREQUENCY: INTERMITTENT
FREQUENCY: CONTINUOUS

## 2021-11-20 ASSESSMENT — PAIN SCALES - GENERAL
PAINLEVEL_OUTOF10: 6
PAINLEVEL_OUTOF10: 5
PAINLEVEL_OUTOF10: 8
PAINLEVEL_OUTOF10: 5
PAINLEVEL_OUTOF10: 8
PAINLEVEL_OUTOF10: 6
PAINLEVEL_OUTOF10: 0
PAINLEVEL_OUTOF10: 8
PAINLEVEL_OUTOF10: 7
PAINLEVEL_OUTOF10: 7
PAINLEVEL_OUTOF10: 4

## 2021-11-20 NOTE — PROGRESS NOTES
Jewelery removed from both ears and both hands and given to  prior to bringing patient down for surgery, patient is in extreme pain, assisted into bed and transported to PACU, pain management per anesthesia

## 2021-11-20 NOTE — PROGRESS NOTES
4 Eyes Skin Assessment     NAME:  Flip Eisenberg  YOB: 1960  MEDICAL RECORD NUMBER:  [id number]    The patient is being assess for  Admission    I agree that 2 RN's have performed a thorough Head to Toe Skin Assessment on the patient. ALL assessment sites listed below have been assessed. Areas assessed by both nurses:    Head, Face, Ears, Shoulders, Back, Chest, Arms, Elbows, Hands, Sacrum. Buttock, Coccyx, Ischium and Legs. Feet and Heels        Does the Patient have a Wound?  No noted wound(s)       Octavio Prevention initiated:  Yes   Wound Care Orders initiated:  NA    Pressure Injury (Stage 3,4, Unstageable, DTI, NWPT, and Complex wounds) if present place consult order under [de-identified] NA    New and Established Ostomies if present place consult order under : NA      Nurse 1 eSignature: Electronically signed by Orlando Rhodes RN on 11/20/21 at 1:20 AM EST    **SHARE this note so that the co-signing nurse is able to place an eSignature**    Nurse 2 eSignature: Electronically signed by Rinku Rayo RN on 11/20/21 at 1:21 AM EST

## 2021-11-20 NOTE — PROGRESS NOTES
Hospital Medicine Progress Note      Admit Date: 11/19/2021       CC: F/U for fall, left shoulder fracture    HPI: Nausea vomiting diarrhea patient is a 51-year-old female with past medical history of diabetes mellitus, tobacco use who presents to the hospital after a fall. According to the patient she was walking her dog, her dog got excited, she could not maintain her balance and fell and hit her left shoulder. Patient mentions her pain was 10/10 intensity, worsens with mobility, improves with pain medication, no associated numbness tingling sensation or weakness. Patient in the emergency department was noticed to have left probably more humeral fracture. Otherwise denied fever chills nausea vomiting diarrhea constipation dysuria. Interval History/Subjective: patient out of surgery. Left shoulder surgical incision dressing in place. Arm in sling. Good cap RF of fingers. Feeling a lot better. Would like to eat. Monitor overnight. Review of Systems:        The patient denied headaches, visual changes, LOC, SOB, CP, ABD pain, N/V/D, skin changes, new or worsening weakness or neuromuscular deficits. Comprehensive ROS negative except as mentioned above. Past Medical History:    History reviewed. No pertinent past medical history. Past Surgical History:    History reviewed. No pertinent surgical history. Allergies:  Cyclosporine    Past medical and surgical history reviewed. Any changes have been noted. PHYSICAL EXAM:  /80   Pulse 83   Temp 98.1 °F (36.7 °C) (Oral)   Resp 16   Ht 5' 1.5\" (1.562 m)   Wt 259 lb (117.5 kg)   SpO2 93%   BMI 48.15 kg/m²       Intake/Output Summary (Last 24 hours) at 11/20/2021 0956  Last data filed at 11/19/2021 1645  Gross per 24 hour   Intake --   Output 1500 ml   Net -1500 ml        General appearance:   No apparent distress, appears stated age. Cooperative. HEENT:  Normocephalic, atraumatic. PERRLA. EOMi.   Conjunctivae/corneas clear, no icterus, non-injected. Neck: Supple, with full range of motion. No jugular venous distention. Trachea midline. Respiratory:  Normal respiratory effort. Clear to auscultation, bilaterally without Rales/Wheezes/Rhonchi. Cardiovascular:  Regular rate and rhythm without murmurs, rubs or gallops. Abdomen: Soft, non-tender, non-distended, without rebound or guarding. Normal bowel sounds. Musculoskeletal:  LUE in sling, good cap RF fingers, left shoulder surgical incision dressing in place; No clubbing, cyanosis or edema bilaterally. Skin: Skin color, texture, turgor normal.  No rashes or lesions. Neurologic:  Neurovascularly intact without any focal sensory/motor deficits. Cranial nerves: II-XII intact, grossly intact. No facial asymmetry, tongue midline. Psychiatric:  Alert and oriented, thought content appropriate  Capillary Refill: Brisk,< 3 seconds   Peripheral Pulses: +2 palpable, equal bilaterally       LABS:    Lab Results   Component Value Date    WBC 16.8 (H) 11/20/2021    HGB 15.6 11/20/2021    HCT 48.0 11/20/2021    MCV 92.0 11/20/2021     11/20/2021    LYMPHOPCT 10.8 11/19/2021    RBC 5.21 (H) 11/20/2021    MCH 29.9 11/20/2021    MCHC 32.5 11/20/2021    RDW 13.8 11/20/2021       Lab Results   Component Value Date    CREATININE 0.6 11/20/2021    BUN 18 11/20/2021     11/20/2021    K 4.1 11/20/2021     11/20/2021    CO2 21 11/20/2021       No results found for: MG    No results found for: ALT, AST, GGT, ALKPHOS, BILITOT     No flowsheet data found. Lab Results   Component Value Date    LABA1C 7.2 11/19/2021       Imaging:  XR CHEST PORTABLE   Final Result   Exam limited by body habitus. Possible small pleural effusions. PA and   lateral chest radiograph could be performed for further assessment as   warranted. XR HUMERUS LEFT (MIN 2 VIEWS)   Final Result   Comminuted fracture of the proximal left humeral shaft as well as   glenohumeral dislocation.              Scheduled index is 48.15 kg/m². The patient and / or the family were informed of the results of any tests, a time was given to answer questions, a plan was proposed and they agreed with plan.       DVT ppx: lovenox      Diet: Diet NPO Exceptions are: Sips of Water with Meds    Consults:  IP CONSULT TO PHARMACY    DISPO/placement plan: pending    Code Status: Full Code      ELVA Landaverde CNP  11/20/21

## 2021-11-20 NOTE — H&P
Hospital Medicine History & Physical      PCP: Geovany Londono RN    Date of Admission: 11/19/2021    Chief Complaint:  Left arm pain    History Of Present Illness:   Nausea vomiting diarrhea patient is a 57-year-old female with past medical history of diabetes mellitus, tobacco use who presents to the hospital after a fall. According to the patient she was walking her dog, her dog got excited, she could not maintain her balance and fell and hit her left shoulder. Patient mentions her pain was 10/10 intensity, worsens with mobility, improves with pain medication, no associated numbness tingling sensation or weakness. Patient in the emergency department was noticed to have left probably more humeral fracture. Otherwise denied fever chills nausea vomiting diarrhea constipation dysuria. Past Medical History:      History reviewed. No pertinent past medical history. Past Surgical History:      History reviewed. No pertinent surgical history. Medications Prior to Admission:      Prior to Admission medications    Medication Sig Start Date End Date Taking? Authorizing Provider   simvastatin (ZOCOR) 40 MG tablet Take 40 mg by mouth nightly   Yes Historical Provider, MD   levothyroxine (SYNTHROID) 175 MCG tablet Take 175 mcg by mouth every other day Alternates with 150 mcg   Yes Historical Provider, MD   levothyroxine (SYNTHROID) 150 MCG tablet Take 150 mcg by mouth every other day Alternates with 175 mcg   Yes Historical Provider, MD   lisinopril-hydroCHLOROthiazide (PRINZIDE;ZESTORETIC) 20-12.5 MG per tablet Take 1 tablet by mouth daily   Yes Historical Provider, MD   Semaglutide 3 MG TABS Take 3 mg by mouth daily   Yes Historical Provider, MD       Allergies:  Cyclosporine    Social History:      TOBACCO:   reports that she has been smoking. She has never used smokeless tobacco.  ETOH:   reports current alcohol use.       Family History:       Reviewed in detail and non contributory      History reviewed. No pertinent family history. REVIEW OF SYSTEMS:   Pertinent positives as noted in the HPI. All other systems reviewed and negative. PHYSICAL EXAM PERFORMED:    /89   Pulse 80   Temp 97.9 °F (36.6 °C) (Oral)   Resp 16   Ht 5' 1.5\" (1.562 m)   Wt 259 lb (117.5 kg)   SpO2 95%   BMI 48.15 kg/m²     General appearance:  No apparent distress, cooperative. HEENT:  Normal cephalic, atraumatic without obvious deformity. Conjunctivae/corneas clear. Neck: Supple, with full range of motion. No cervical lymphadenopathy  Respiratory:  Normal respiratory effort. Clear to auscultation, bilaterally without Rales/Wheezes/Rhonchi. Cardiovascular:  Regular rate and rhythm with normal S1/S2 without murmurs, rubs or gallops. Abdomen: Soft, non-tender, non-distended, normal bowel sounds. Musculoskeletal: Left arm in sling, decreased range of motion on left arm due to pain, no edema noted bilaterally lower extremities  Skin: no rash visible  Neurologic:  Neurologically intact without any focal sensory/motor deficits. grossly non-focal.  Psychiatric:  Alert and oriented, normal mood  Peripheral Pulses: +2 palpable, equal bilaterally       Labs:     Recent Labs     11/19/21  1455   WBC 26.6*   HGB 16.4*   HCT 48.6*        Recent Labs     11/19/21  1455   *   K 4.1   CL 97*   CO2 22   BUN 16   CREATININE 0.7   CALCIUM 9.5     No results for input(s): AST, ALT, BILIDIR, BILITOT, ALKPHOS in the last 72 hours. No results for input(s): INR in the last 72 hours. No results for input(s): Kaleen Hope in the last 72 hours. Urinalysis:      Lab Results   Component Value Date    NITRU Negative 11/19/2021    WBCUA 31 11/19/2021    BACTERIA 2+ 11/19/2021    RBCUA 0-2 11/19/2021    BLOODU Negative 11/19/2021    SPECGRAV 1.023 11/19/2021    GLUCOSEU Negative 11/19/2021       Radiology:       XR CHEST PORTABLE   Final Result   Exam limited by body habitus. Possible small pleural effusions.   PA and lateral chest radiograph could be performed for further assessment as   warranted. XR HUMERUS LEFT (MIN 2 VIEWS)   Final Result   Comminuted fracture of the proximal left humeral shaft as well as   glenohumeral dislocation. Active Hospital Problems    Diagnosis Date Noted    Shoulder pain [M25.519] 11/19/2021          Nausea vomiting diarrhea patient is a 77-year-old female with past medical history of diabetes mellitus, tobacco use who presents to the hospital after a fall. According to the patient she was walking her dog, her dog got excited, she could not maintain her balance and fell and hit her left shoulder. Patient mentions her pain was 10/10 intensity, worsens with mobility, improves with pain medication, no associated numbness tingling sensation or weakness. Patient in the emergency department was noticed to have left probably more humeral fracture. Otherwise denied fever chills nausea vomiting diarrhea constipation dysuria. Assessment  Fall  Left proximal humeral shaft fracture  Humeral head displacement  Diabetes mellitus  Leukocytosis likely reactive      Plan  Orthopedics consulted from emergency department, likely plan for surgery tomorrow, n.p.o. after midnight, pain medication, consult PT/OT  Insulin sliding scale  DVT prophylaxis with Lovenox  Resume home medications  Diet: DM diet  Code Status: No Order    PT/OT Eval Status: ordered    Dispo - pending clinical improvement       Enio Painter MD    The note was completed using EMR and Dragon dictation system. Every effort was made to ensure accuracy; however, inadvertent computerized transcription errors may be present. Thank you Shannon Washington RN for the opportunity to be involved in this patient's care. If you have any questions or concerns please feel free to contact me at [phone].     Enio Painter MD

## 2021-11-20 NOTE — PROGRESS NOTES
Physical Therapy    Facility/Department: [address] ORTHOPEDICS  Initial Assessment    NAME: Bobby Montague  : 1960  MRN: [id number]    Date of Service: 2021    Assessment / Discharge Recommendations:  -anticipate discharge to home with assist of her   -recommend Home PT OT to help assure safety and effectiveness managing while NWB left UE  -ambulated today to and from the bathroom with hand in hand assist at [facility]  -anticipate Home PTOT able to determine if use of cane provides easier mobility for her   -prior to this fall she was fully independent with her mobility    Body structures, Functions, Activity limitations: Decreased functional mobility ; Decreased balance; Decreased ADL status; Decreased strength; Increased pain  Prognosis: Good  Decision Making: Medium Complexity  REQUIRES PT FOLLOW UP: Yes  Activity Tolerance  Activity Tolerance: Patient Tolerated treatment well       Patient Diagnosis(es): The encounter diagnosis was Closed fracture of proximal end of left humerus, unspecified fracture morphology, initial encounter. has no past medical history on file. has no past surgical history on file.     Restrictions  Restrictions/Precautions  Restrictions/Precautions: Weight Bearing, Fall Risk  Required Braces or Orthoses?: Yes  Lower Extremity Weight Bearing Restrictions  Left Lower Extremity Weight Bearing: Non Weight Bearing  Required Braces or Orthoses  Right Upper Extremity Brace/Splint: Sling (shoulder immobilizer)  Position Activity Restriction  Other position/activity restrictions: Per Dr. Shaneka Espinosa \"LUE remain in sling except for pendulums\"  Vision/Hearing  Vision: Within Functional Limits  Hearing: Within functional limits     Subjective  General  Chart Reviewed: Yes  Patient assessed for rehabilitation services?: Yes  Additional Pertinent Hx: here due to fall while walking her dog to the yard - dog pulled here forward hard and she fell  Response To Previous Treatment: Not applicable  Family / Caregiver Present: Yes ()  Follows Commands: Within Functional Limits  Subjective  Subjective: arrived to room along with OT to request from patient to use the bathroom to urinate- agreeable to PTOT in this context  - states pain at rest is moderate -no reports of lightheadedness  Pain Screening  Patient Currently in Pain: Yes  Orientation  Orientation  Overall Orientation Status: Within Functional Limits  Social/Functional History  Social/Functional History  Lives With: Spouse  Type of Home: House  Home Layout: Two level, Able to Live on Main level with bedroom/bathroom  Home Access: Stairs to enter with rails  Entrance Stairs - Number of Steps: 3  Entrance Stairs - Rails: Left  Bathroom Shower/Tub: Tub/Shower unit  Bathroom Toilet: Standard  Bathroom Accessibility: Accessible  ADL Assistance: Independent  Homemaking Assistance: Independent  Ambulation Assistance: Independent  Transfer Assistance: Independent  Additional Comments: fall brought here walking dog. sleeps in recgular bed- has recliner.  Spouse to provide 24 hr assist  Cognition   Cognition  Overall Cognitive Status: WFL  Observation/Palpation  Observation: LUE within sling with abduction pillow- adjusted  Motor Control  Gross Motor?: WFL  Sensation  Overall Sensation Status: WFL  Bed mobility  Supine to Sit: Minimal assistance  Transfers  Sit to Stand: Contact guard assistance  Stand to sit: Contact guard assistance  Ambulation  Ambulation?: Yes  Ambulation 1  Surface: level tile  Device: No Device  Assistance: Contact guard assistance  Quality of Gait: partial step through pattern with adequate base of support  Distance: in room to bathroom and on to the recliner for ~30 feet  Comments: overall stable with cga \"hand in hand\" assist  Stairs/Curb  Stairs?: No  Balance  Comments: midline in sitting at the EOB and in static stance   -dynamic is good with hand in hand assist (cga0  Exercises  Comments: encouraged practice with the spirometer as instructed by RT   Plan   Plan  Times per week: anticipate several sessions if needed before discharge  Current Treatment Recommendations: Transfer Training, ADL/Self-care Training, Positioning, Functional Mobility Training, Patient/Caregiver Education & Training  Safety Devices  Type of devices:  All fall risk precautions in place, Call light within reach, Chair alarm in place, Left in chair, Nurse notified Selina [place])  AM-PAC Score  AM-PAC Inpatient Mobility Raw Score : 15 (11/20/21 1335)  AM-PAC Inpatient T-Scale Score : 39.45 (11/20/21 1335)  Mobility Inpatient CMS 0-100% Score: 57.7 (11/20/21 1335)  Mobility Inpatient CMS G-Code Modifier : CK (11/20/21 1335)  Goals  Short term goals  Time Frame for Short term goals: 1-2 days  Short term goal 1: bed mobility at [address] [place] [address] term goal 2: transfers at [facility]  Short term goal 3: ambulation at [facility] as needed for safety initially  - Home PT OT to assess for cane for stability as needed  Patient Goals   Patient goals : get home and get well again       Therapy Time   Individual Concurrent Group Co-treatment   Time In 1300         Time Out 1340         Minutes 48 Stefani Antony, PT

## 2021-11-20 NOTE — PROGRESS NOTES
Pt off floor to go down to surgery.    Electronically signed by Isaiah Estrada RN on 11/20/2021 at 7:58 AM

## 2021-11-20 NOTE — PROGRESS NOTES
Pt back from PACU at this time.    Electronically signed by Anisa Valdovinos RN on 11/20/2021 at 11:33 AM

## 2021-11-20 NOTE — PLAN OF CARE
Problem: Pain:  Goal: Pain level will decrease  Description: Pain level will decrease  11/20/2021 1154 by Susannah Cornell RN  Outcome: Ongoing  Note: Assessing and monitoring pt's pain. PRN pain medication being given if ordered. Educating pt on nonpharmacologic interventions for pain control. Will continue to monitor and reassess. Problem: Pain:  Goal: Control of acute pain  Description: Control of acute pain  11/20/2021 1154 by Susannah Cornell RN  Outcome: Ongoing  Note: Assessing and monitoring pt's pain. PRN pain medication being given if ordered. Educating pt on nonpharmacologic interventions for pain control. Will continue to monitor and reassess. Problem: Pain:  Goal: Control of chronic pain  Description: Control of chronic pain  11/20/2021 1154 by Susannah Cornell RN  Outcome: Ongoing  Note: Assessing and monitoring pt's pain. PRN pain medication being given if ordered. Educating pt on nonpharmacologic interventions for pain control. Will continue to monitor and reassess. Problem: Falls - Risk of:  Goal: Will remain free from falls  Description: Will remain free from falls  11/20/2021 1154 by Susannah Cornell RN  Outcome: Ongoing  Note: Fall risk assessment completed. Fall precautions in place. Bed in lowest position, wheels locked, bed/chair exit alarm in place, call light within reach, and non skid footwear on. Walkway free of clutter. Pt alert and oriented and able to make needs known. Pt educated to use call light when needing to get up, and pt utilizes call light to make needs known. Will continue to monitor.         Problem: Falls - Risk of:  Goal: Absence of physical injury  Description: Absence of physical injury  11/20/2021 1154 by Susannah Cornell RN  Outcome: Ongoing  Note: Pt absent from physical injury on this shift

## 2021-11-20 NOTE — PROGRESS NOTES
Occupational Therapy   Occupational Therapy Initial Assessment  Date: 2021   Patient Name: Santa Wei  MRN: [id number]     : 1960    Date of Service: 2021    Discharge Recommendations:  2-3 sessions per week, Patient would benefit from continued therapy after discharge, Home with Home health OT, 24 hour supervision or assist  OT Equipment Recommendations  Equipment Needed: Yes  Mobility Devices: ADL Assistive Devices  ADL Assistive Devices: Shower Chair with back    Santa Wei scored a 17/24 on the AM-PAC ADL Inpatient form. Current research shows that an AM-PAC score of 18 or greater is typically associated with a discharge to the patient's home setting. HOWEVER, pt with appropriate assist for ADLs and will improve quickly with HHOT. Based on this, patient's AM-PAC score, and their current ADL deficits, it is recommended that the patient have 2-3 sessions per week of Occupational Therapy at d/c to increase the patient's independence. At this time, this patient demonstrates the endurance and safety to discharge home with [address] [place] [address] (home vs OP services) and a follow up treatment frequency of 2-3x/wk. Please see assessment section for further patient specific details. If patient discharges prior to next session this note will serve as a discharge summary. Please see below for the latest assessment towards goals. Assessment   Performance deficits / Impairments: Decreased functional mobility ; Decreased balance; Decreased ADL status; Decreased ROM; Decreased endurance; Decreased high-level IADLs  Assessment: 65 yo female admitted  for a fall with LUE humerus fx. s/p  ORIF L humerus- now NWB. PMH: diabetes mellitus, tobacco use. PTA, pt lives with spouse and was independent with ADL, IADL, fxl mobility and driving. Today, pt functioning below baseline most limited by LUE NWB and immobilization.  Pt requiring min A bed mobility and Min/CGA tx and fxl mobility with HHA household distance with little unsteadiness. Pt requiring Mod A Lb dressing (pants), Min A toileting and CGA sink side hand washing. Pt and spouse educated on LUE NWB, began education on one handed ADLs, seated ADLs, benefits of shower chair, [address] ADLs one handed and safety, staying on main level in recliner for now, and continued digit use/AROM to prevent stiffness. Spouse able to provide 24 hr assist and anticipate pt able to d/c home with this and OT 2-3x/week to facilitate return home and address ADLs. Cont acute OT to address above deficits  Treatment Diagnosis: impaired ADL/fxl mobility  Prognosis: Good  Decision Making: Medium Complexity  OT Education: OT Role; Plan of Care; Transfer Training; ADL Adaptive Strategies; Precautions  Patient Education: Pt and spouse educated on LUE NWB, began education on one handed ADLs, seated ADLs, benefits of shower chair, HHOT ADLs one handed and safety, and staying on main level in recliner for now, and continued digit use/AROM to prevent stiffness  REQUIRES OT FOLLOW UP: Yes  Activity Tolerance  Activity Tolerance: Patient Tolerated treatment well; Patient limited by pain; Patient limited by fatigue  Safety Devices  Safety Devices in place: Yes  Type of devices: Nurse notified; Left in chair; Patient at risk for falls; Gait belt; Call light within reach; Chair alarm in place         Patient Diagnosis(es): The encounter diagnosis was Closed fracture of proximal end of left humerus, unspecified fracture morphology, initial encounter. has no past medical history on file. has no past surgical history on file.     Treatment Diagnosis: impaired ADL/fxl mobility      Restrictions  Restrictions/Precautions  Restrictions/Precautions: Weight Bearing, Fall Risk  Required Braces or Orthoses?: Yes  Lower Extremity Weight Bearing Restrictions  Left Lower Extremity Weight Bearing: Non Weight Bearing  Required Braces or Orthoses  Right Upper Extremity Brace/Splint: Sling (shoulder immobilizer)  Position Activity Restriction  Other position/activity restrictions: Per Dr. Robert Angeles \"LUE remain in sling except for pendulums\"    Subjective   General  Chart Reviewed: Yes  Patient assessed for rehabilitation services?: Yes  Additional Pertinent Hx: 63 yo female admitted 11/19 for a fall with LUE humerus fx. s/p 11/20 ORIF L humerus- now NWB. PMH: diabetes mellitus, tobacco use  Family / Caregiver Present: Yes (spouse)  Referring Practitioner: Dionisio Lala MD  Diagnosis: L humerus fx  Subjective  Subjective: Pt resting in bed upon arrival and agreeable to OT. PT eval. Pt reporting mild LUE shoulder pain.   General Comment  Comments: RN ok to see  Patient Currently in Pain: Yes  Pain Assessment  Pain Assessment: 0-10  Pain Level: 6  Patient's Stated Pain Goal: No pain  Pain Type: Surgical pain  Pain Location: Shoulder  Pain Orientation: Left  Pain Descriptors: Burning  Pain Frequency: Continuous  Pain Onset: On-going  Clinical Progression: Not changed  Functional Pain Assessment: Prevents or interferes some active activities and ADLs  Non-Pharmaceutical Pain Intervention(s): Rest; Repositioned; Relaxation techniques; Cold applied  Response to Pain Intervention: Patient Satisfied  Multiple Pain Sites: No  Vital Signs  Temp: 97.5 °F (36.4 °C)  Pulse: 88  Heart Rate Source: Monitor  Resp: 15  BP: 130/78  BP Location: Right Arm  MAP (mmHg): 95  Level of Consciousness: Alert (0)  MEWS Score: 1  Patient Currently in Pain: Yes  Oxygen Therapy  SpO2: 96 %  Pulse Oximeter Device Mode: Intermittent  O2 Device: Nasal cannula  Skin Assessment: Clean, dry, & intact  Skin Protection for O2 Device: No  O2 Flow Rate (L/min): 3 L/min    Social/Functional History  Social/Functional History  Lives With: Spouse  Type of Home: House  Home Layout: Two level, Able to Live on Main level with bedroom/bathroom  Home Access: Stairs to enter with rails  Entrance Stairs - Number of Steps: 3  Entrance Stairs - Rails: Left  Bathroom Shower/Tub: Tub/Shower unit  Bathroom Toilet: Standard  Bathroom Accessibility: Accessible  ADL Assistance: Independent  Homemaking Assistance: Independent  Ambulation Assistance: Independent  Transfer Assistance: Independent  Additional Comments: fall brought here walking dog. sleeps in recgular bed- has recliner. Spouse to provide 24 hr assist       Objective   Vision: Within Functional Limits  Hearing: Within functional limits    Orientation  Overall Orientation Status: Within Normal Limits  Observation/Palpation  Observation: LUE within sling with abduction pillow- adjusted  Balance  Sitting Balance: Stand by assistance  Standing Balance: Contact guard assistance (standing for pant management and sink side hand washing ~2 min)  Functional Mobility  Functional - Mobility Device:  (Hand held assist)  Activity:  (EOB>bathroom>recliner)  Assist Level: Minimal assistance  Functional Mobility Comments: CGA/Min A with HHA household distance fxl mobility with little to no unsteadiness, no LOB  Toilet Transfers  Toilet - Technique: Ambulating  Equipment Used: Grab bars (R)  Toilet Transfer: Contact guard assistance; Minimal assistance  ADL  Grooming: Contact guard assistance (sink side hand washing)  LE Dressing:  Moderate assistance (assist threading and over L hip)  Toileting: Minimal assistance (assist managing pants on L)  Tone RUE  RUE Tone: Normotonic  Tone LUE  LUE Tone: Normotonic  Coordination  Movements Are Fluid And Coordinated: Yes     Bed mobility  Supine to Sit: Minimal assistance  Transfers  Sit to stand: Minimal assistance  Stand to sit: Minimal assistance  Transfer Comments: HHA     Cognition  Overall Cognitive Status: WFL        Sensation  Overall Sensation Status: WFL        LUE AROM (degrees)  LUE General AROM: EDENILSON immobilizer- full digit  RUE AROM (degrees)  RUE AROM : WFL  LUE Strength  LUE Strength Comment: EDENILSON  RUE Strength  Gross RUE Strength: WFL                Plan   Plan  Times per week: 2-3  Specific instructions for Next Treatment: pendulums + non shoulder AROM + ADL techniques  Current Treatment Recommendations: Endurance Training, Patient/Caregiver Education & Training, ROM, Self-Care / ADL, Balance Training, Pain Management, Functional Mobility Training, Safety Education & Training, Positioning    AM-PAC Score        AM-PAC Inpatient Daily Activity Raw Score: 17 (11/20/21 [facility])  AM-PAC Inpatient ADL T-Scale Score : 37.26 (11/20/21 [facility])  ADL Inpatient CMS 0-100% Score: 50.11 (11/20/21 [facility])  ADL Inpatient CMS G-Code Modifier : CK (11/20/21 [facility])    Goals  Short term goals  Time Frame for Short term goals: prior to d/c  Short term goal 1: toileting CGA  Short term goal 2: LB dressing Min A  Short term goal 3: complete LUE shoulder pendulums and other joint AROM exercises x10  Short term goal 4: tolerate 5 min fxl standing task SBA  Short term goal 5: UB dressing/sling management with min A  Patient Goals   Patient goals : go home       Therapy Time   Individual Concurrent Group Co-treatment   Time In 1300         Time Out 1340         Minutes 40         Timed Code Treatment Minutes: 25 Minutes (15 eval. 15 ADL.  10 TA)       Terrie Villeda, DASHA, OTR/L

## 2021-11-20 NOTE — PROGRESS NOTES
This RN responded to patient's call light. Patient complaining of 10/10 pain. Reviewed pain medication orders, patient received pain medication at 22:13. Educated patient that per MD orders she can not have IV and PO within 1 hour of administration. Patient very upset grabbing at left arm and rocking in bed. Perfect serve message sent to on call NP, Kiesha Junior, regarding patient pain control. Will wait for further orders and update patient.

## 2021-11-20 NOTE — PROGRESS NOTES
Occupational Therapy    11/20/21    Rishi Dugan  1960  [id number]    OT orders received, patient chart reviewed. Pt leaving shortly for surgery this am. Will need new orders after procedure.  Will attempt again as pt and schedule allows    Electronically signed by DASHA Martin, OTR/L on 11/20/2021 at 7:27 AM

## 2021-11-20 NOTE — ANESTHESIA PRE PROCEDURE
IntraVENous PRN Stephani Crarillo  mL/hr at 11/20/21 0041 25 mL at 11/20/21 0041    potassium chloride 10 mEq/100 mL IVPB (Peripheral Line)  10 mEq IntraVENous PRN Stephani Carrillo MD        magnesium sulfate 2000 mg in 50 mL IVPB premix  2,000 mg IntraVENous PRN Stephani Carrillo MD        enoxaparin (LOVENOX) injection 40 mg  40 mg SubCUTAneous Daily Stephani Carrillo MD        ondansetron (ZOFRAN-ODT) disintegrating tablet 4 mg  4 mg Oral Q8H PRN Stephani Carrillo MD        Or    ondansetron (ZOFRAN) injection 4 mg  4 mg IntraVENous Q6H PRN Stephani Carrillo MD        magnesium hydroxide (MILK OF MAGNESIA) 400 MG/5ML suspension 30 mL  30 mL Oral Daily PRN Stephani Carrillo MD        acetaminophen (TYLENOL) tablet 650 mg  650 mg Oral Q6H PRN Stephani Carrillo MD        Or    acetaminophen (TYLENOL) suppository 650 mg  650 mg Rectal Q6H PRN Stephani Carrillo MD        levothyroxine (SYNTHROID) tablet 150 mcg  150 mcg Oral Every Other Day Stephani Carrillo MD   150 mcg at 11/20/21 0528    lisinopril-hydroCHLOROthiazide (PRINZIDE;ZESTORETIC) 20-12.5 MG per tablet 1 tablet  1 tablet Oral Daily Stephani Carrillo MD        atorvastatin (LIPITOR) tablet 20 mg  20 mg Oral Nightly Stephani Carrillo MD   20 mg at 11/19/21 2122    glucose (GLUTOSE) 40 % oral gel 15 g  15 g Oral PRN Stephani Carrillo MD        dextrose 50 % IV solution  12.5 g IntraVENous PRN Stephani Carrillo MD        glucagon (rDNA) injection 1 mg  1 mg IntraMUSCular PRN Stephani Carrillo MD        dextrose 5 % solution  100 mL/hr IntraVENous PRN Stephani Carrillo MD        insulin lispro (HUMALOG) injection vial 0-12 Units  0-12 Units SubCUTAneous TID WC Stephani Carrillo MD        insulin lispro (HUMALOG) injection vial 0-6 Units  0-6 Units SubCUTAneous Nightly Stephani Carrillo MD   1 Units at 11/19/21 2140    [START ON 11/21/2021] levothyroxine (SYNTHROID) tablet 175 mcg  175 mcg Oral Every Other Day Stephani Carrillo MD        oxyCODONE (ROXICODONE) immediate release tablet 5 mg  5 mg Oral Q4H PRN ELVA Rodriguez CNP        Or   Lovely Stoner oxyCODONE HCl (OXY-IR) immediate release tablet 10 mg  10 mg Oral Q4H PRN ELVA Rodriguez CNP        methocarbamol (ROBAXIN) 500 mg in dextrose 5 % 100 mL IVPB  500 mg IntraVENous Q8H ELVA Oliva - CNP   Stopped at 21 0130     Vital Signs (Current)   Vitals:    21 0703   BP: 124/80   Pulse: 83   Resp: 16   Temp: 98.1 °F (36.7 °C)   SpO2: 93%     Vital Signs Statistics (for past 48 hrs)     Temp  Av.8 °F (36.6 °C)  Min: 97.5 °F (36.4 °C)   Min taken time: 21 1246  Max: 98.1 °F (36.7 °C)   Max taken time: 21 07  Pulse  Av.9  Min: 75   Min taken time: 21 204  Max: 83   Max taken time: 21 0703  Resp  Av.7  Min: 16   Min taken time: 21 07  Max: 20   Max taken time: 21 1410  BP  Min: 118/75   Min taken time: 21 0530  Max: 148/94   Max taken time: 21 1410  MAP (mmHg)  Av  Min: 85   Min taken time: 21  Max: 95   Max taken time: 21 0703  SpO2  Av %  Min: 93 %   Min taken time: 21  Max: 95 %   Max taken time: 21 195    BP Readings from Last 3 Encounters:   21 124/80     BMI  Body mass index is 48.15 kg/m². Estimated body mass index is 48.15 kg/m² as calculated from the following:    Height as of this encounter: 5' 1.5\" (1.562 m). Weight as of this encounter: 259 lb (117.5 kg).     CBC   Lab Results   Component Value Date    WBC 16.8 2021    RBC 5.21 2021    HGB 15.6 2021    HCT 48.0 2021    MCV 92.0 2021    RDW 13.8 2021     2021     CMP    Lab Results   Component Value Date     2021    K 4.1 2021     2021    CO2 21 2021    BUN 18 2021    CREATININE 0.6 2021    GFRAA >60 2021    LABGLOM >60 2021    GLUCOSE 117 2021    CALCIUM 9.1 2021     BMP    Lab Results   Component Value Date     11/20/2021    K 4.1 11/20/2021     11/20/2021    CO2 21 11/20/2021    BUN 18 11/20/2021    CREATININE 0.6 11/20/2021    CALCIUM 9.1 11/20/2021    GFRAA >60 11/20/2021    LABGLOM >60 11/20/2021    GLUCOSE 117 11/20/2021     POCGlucose  Recent Labs     11/19/21  1455 11/20/21  0438   GLUCOSE 229* 117*      Coags  No results found for: PROTIME, INR, APTT  HCG (If Applicable) No results found for: PREGTESTUR, PREGSERUM, HCG, HCGQUANT   ABGs No results found for: PHART, PO2ART, VXX7SBL, NKD8ZVR, BEART, P9RCHOJS   Type & Screen (If Applicable)  No results found for: LABABO, LABRH                         BMI: Wt Readings from Last 3 Encounters:       NPO Status:   Date of last liquid consumption: 11/19/21   Time of last liquid consumption: 2300   Date of last solid food consumption: 11/19/21      Time of last solid consumption: 2300       Anesthesia Evaluation  Patient summary reviewed no history of anesthetic complications:   Airway: Mallampati: III  TM distance: >3 FB   Neck ROM: full   Dental:          Pulmonary:Negative Pulmonary ROS   (+) decreased breath sounds,                             Cardiovascular:  Exercise tolerance: good (>4 METS),   (+) hypertension:,       ECG reviewed  Rhythm: regular  Rate: normal           Beta Blocker:  Not on Beta Blocker         Neuro/Psych:   Negative Neuro/Psych ROS              GI/Hepatic/Renal:   (+) morbid obesity     (-) GERD       Endo/Other:    (+) DiabetesType II DM, , hypothyroidism::., .                 Abdominal:   (+) obese,           Vascular: negative vascular ROS. Other Findings: Poor dentition throughout          Anesthesia Plan      general     ASA 3 - emergent       Induction: intravenous. MIPS: Postoperative opioids intended and Prophylactic antiemetics administered. Anesthetic plan and risks discussed with patient and spouse.                 This pre-anesthesia assessment may be used as a history and physical.    DOS STAFF ADDENDUM:    Pt seen and examined, chart reviewed (including anesthesia, drug and allergy history). No interval changes to history and physical examination. Anesthetic plan, risks, benefits, alternatives, and personnel involved discussed with patient. Questions and concerns addressed. Patient(family) verbalized an understanding and agrees to proceed.       Gilma Kaur MD  November 20, 2021  7:38 AM

## 2021-11-20 NOTE — PROGRESS NOTES
This is a right-hand-dominant 20-year-old female who sustained a left proximal humerus fracture dislocation yesterday. She has a palpable radial pulse. She demonstrates active finger and wrist range of motion. We discussed the diagnosis and I recommended open reduction internal fixation. We went over the risks and complications of surgery including: bleeding, infection, decreased ROM, continued pain, instability, fracture, dislocation, neurovascular injury, post op cognitive disorder, DVT, pulmonary embolism and need for further surgical procedures. Informed consent for surgery was signed by the patient.     Kristi Fleming MD  11/20/2021

## 2021-11-20 NOTE — PROGRESS NOTES
Report called to [facility] SYS WASECA, RN on 3W. VSS. Ok to transfer back to room 3103.

## 2021-11-20 NOTE — PROGRESS NOTES
PT admitted to unit via wheel chair to room 3103. Heart monitor applied and confirmed with CM[facility]. VSS at this time. 4 eye skin assessment completed. PT educated on fall precautions and fall contract reviewed and signed. PT in bed with non-skid footwear on. Belongings and call light are placed within reach. PT educated to call appropriately for needs, PT verbalized understanding of education. Bed placed in lowest position with siderails up and wheels locked. Will continue to monitor.

## 2021-11-20 NOTE — PROGRESS NOTES
Pt arrived to PACU from OR. Pt asleep on 8l/NRB. Left shoulder dressing C/D/I. +pulses noted. Ice pack applied.

## 2021-11-21 LAB
ANION GAP SERPL CALCULATED.3IONS-SCNC: 10 MMOL/L (ref 3–16)
BUN BLDV-MCNC: 12 MG/DL (ref 7–20)
CALCIUM SERPL-MCNC: 8.6 MG/DL (ref 8.3–10.6)
CHLORIDE BLD-SCNC: 99 MMOL/L (ref 99–110)
CO2: 24 MMOL/L (ref 21–32)
CREAT SERPL-MCNC: <0.5 MG/DL (ref 0.6–1.2)
GFR AFRICAN AMERICAN: >60
GFR NON-AFRICAN AMERICAN: >60
GLUCOSE BLD-MCNC: 136 MG/DL (ref 70–99)
GLUCOSE BLD-MCNC: 150 MG/DL (ref 70–99)
GLUCOSE BLD-MCNC: 158 MG/DL (ref 70–99)
GLUCOSE BLD-MCNC: 161 MG/DL (ref 70–99)
GLUCOSE BLD-MCNC: 179 MG/DL (ref 70–99)
HCT VFR BLD CALC: 41.6 % (ref 36–48)
HEMOGLOBIN: 13.6 G/DL (ref 12–16)
MCH RBC QN AUTO: 30.1 PG (ref 26–34)
MCHC RBC AUTO-ENTMCNC: 32.8 G/DL (ref 31–36)
MCV RBC AUTO: 91.8 FL (ref 80–100)
PDW BLD-RTO: 13.6 % (ref 12.4–15.4)
PERFORMED ON: ABNORMAL
PLATELET # BLD: 311 K/UL (ref 135–450)
PMV BLD AUTO: 7.8 FL (ref 5–10.5)
POTASSIUM REFLEX MAGNESIUM: 4.7 MMOL/L (ref 3.5–5.1)
RBC # BLD: 4.53 M/UL (ref 4–5.2)
SODIUM BLD-SCNC: 133 MMOL/L (ref 136–145)
URINE CULTURE, ROUTINE: NORMAL
WBC # BLD: 15.9 K/UL (ref 4–11)

## 2021-11-21 PROCEDURE — 36415 COLL VENOUS BLD VENIPUNCTURE: CPT

## 2021-11-21 PROCEDURE — 6370000000 HC RX 637 (ALT 250 FOR IP): Performed by: ORTHOPAEDIC SURGERY

## 2021-11-21 PROCEDURE — 2580000003 HC RX 258: Performed by: ORTHOPAEDIC SURGERY

## 2021-11-21 PROCEDURE — 6360000002 HC RX W HCPCS: Performed by: ORTHOPAEDIC SURGERY

## 2021-11-21 PROCEDURE — 1200000000 HC SEMI PRIVATE

## 2021-11-21 PROCEDURE — 85027 COMPLETE CBC AUTOMATED: CPT

## 2021-11-21 PROCEDURE — 6360000002 HC RX W HCPCS: Performed by: NURSE PRACTITIONER

## 2021-11-21 PROCEDURE — 80048 BASIC METABOLIC PNL TOTAL CA: CPT

## 2021-11-21 PROCEDURE — 94761 N-INVAS EAR/PLS OXIMETRY MLT: CPT

## 2021-11-21 RX ORDER — METHOCARBAMOL 500 MG/1
500 TABLET, FILM COATED ORAL 4 TIMES DAILY
Qty: 20 TABLET | Refills: 0 | Status: SHIPPED | OUTPATIENT
Start: 2021-11-21 | End: 2021-12-06 | Stop reason: SDUPTHER

## 2021-11-21 RX ORDER — ASPIRIN 81 MG/1
81 TABLET ORAL 2 TIMES DAILY
Qty: 60 TABLET | Refills: 0 | Status: SHIPPED | OUTPATIENT
Start: 2021-11-21 | End: 2021-12-21

## 2021-11-21 RX ORDER — OXYCODONE HYDROCHLORIDE 5 MG/1
5 TABLET ORAL EVERY 4 HOURS PRN
Qty: 20 TABLET | Refills: 0 | Status: SHIPPED | OUTPATIENT
Start: 2021-11-21 | End: 2021-11-24

## 2021-11-21 RX ADMIN — MORPHINE SULFATE 2 MG: 2 INJECTION, SOLUTION INTRAMUSCULAR; INTRAVENOUS at 16:53

## 2021-11-21 RX ADMIN — ENOXAPARIN SODIUM 40 MG: 100 INJECTION SUBCUTANEOUS at 08:50

## 2021-11-21 RX ADMIN — OXYCODONE HYDROCHLORIDE 10 MG: 10 TABLET ORAL at 15:53

## 2021-11-21 RX ADMIN — INSULIN LISPRO 1 UNITS: 100 INJECTION, SOLUTION INTRAVENOUS; SUBCUTANEOUS at 20:59

## 2021-11-21 RX ADMIN — OXYCODONE HYDROCHLORIDE 10 MG: 10 TABLET ORAL at 11:00

## 2021-11-21 RX ADMIN — MORPHINE SULFATE 2 MG: 2 INJECTION, SOLUTION INTRAMUSCULAR; INTRAVENOUS at 22:01

## 2021-11-21 RX ADMIN — Medication 10 ML: at 08:50

## 2021-11-21 RX ADMIN — MORPHINE SULFATE 2 MG: 2 INJECTION, SOLUTION INTRAMUSCULAR; INTRAVENOUS at 08:50

## 2021-11-21 RX ADMIN — ATORVASTATIN CALCIUM 20 MG: 20 TABLET, FILM COATED ORAL at 21:00

## 2021-11-21 RX ADMIN — Medication 10 ML: at 21:00

## 2021-11-21 RX ADMIN — METHOCARBAMOL 500 MG: 100 INJECTION, SOLUTION INTRAMUSCULAR; INTRAVENOUS at 08:56

## 2021-11-21 RX ADMIN — INSULIN LISPRO 2 UNITS: 100 INJECTION, SOLUTION INTRAVENOUS; SUBCUTANEOUS at 09:05

## 2021-11-21 RX ADMIN — OXYCODONE HYDROCHLORIDE 10 MG: 10 TABLET ORAL at 06:54

## 2021-11-21 RX ADMIN — INSULIN LISPRO 2 UNITS: 100 INJECTION, SOLUTION INTRAVENOUS; SUBCUTANEOUS at 12:46

## 2021-11-21 RX ADMIN — MORPHINE SULFATE 2 MG: 2 INJECTION, SOLUTION INTRAMUSCULAR; INTRAVENOUS at 12:44

## 2021-11-21 RX ADMIN — SODIUM CHLORIDE 25 ML: 9 INJECTION, SOLUTION INTRAVENOUS at 08:55

## 2021-11-21 RX ADMIN — SODIUM CHLORIDE 25 ML: 9 INJECTION, SOLUTION INTRAVENOUS at 18:19

## 2021-11-21 RX ADMIN — METHOCARBAMOL 500 MG: 100 INJECTION, SOLUTION INTRAMUSCULAR; INTRAVENOUS at 18:19

## 2021-11-21 RX ADMIN — Medication 10 ML: at 22:03

## 2021-11-21 RX ADMIN — LISINOPRIL AND HYDROCHLOROTHIAZIDE 1 TABLET: 12.5; 2 TABLET ORAL at 08:50

## 2021-11-21 RX ADMIN — MORPHINE SULFATE 2 MG: 2 INJECTION, SOLUTION INTRAMUSCULAR; INTRAVENOUS at 05:18

## 2021-11-21 RX ADMIN — CEFAZOLIN 2000 MG: 10 INJECTION, POWDER, FOR SOLUTION INTRAVENOUS at 00:29

## 2021-11-21 RX ADMIN — LEVOTHYROXINE SODIUM 175 MCG: 0.12 TABLET ORAL at 05:18

## 2021-11-21 RX ADMIN — OXYCODONE HYDROCHLORIDE 10 MG: 10 TABLET ORAL at 19:54

## 2021-11-21 ASSESSMENT — PAIN - FUNCTIONAL ASSESSMENT
PAIN_FUNCTIONAL_ASSESSMENT: PREVENTS OR INTERFERES WITH ALL ACTIVE AND SOME PASSIVE ACTIVITIES
PAIN_FUNCTIONAL_ASSESSMENT: PREVENTS OR INTERFERES SOME ACTIVE ACTIVITIES AND ADLS

## 2021-11-21 ASSESSMENT — PAIN DESCRIPTION - FREQUENCY
FREQUENCY: CONTINUOUS

## 2021-11-21 ASSESSMENT — PAIN DESCRIPTION - PROGRESSION

## 2021-11-21 ASSESSMENT — PAIN DESCRIPTION - ONSET
ONSET: ON-GOING

## 2021-11-21 ASSESSMENT — PAIN DESCRIPTION - LOCATION
LOCATION: SHOULDER

## 2021-11-21 ASSESSMENT — PAIN DESCRIPTION - ORIENTATION
ORIENTATION: LEFT

## 2021-11-21 ASSESSMENT — PAIN SCALES - GENERAL
PAINLEVEL_OUTOF10: 8
PAINLEVEL_OUTOF10: 9
PAINLEVEL_OUTOF10: 7
PAINLEVEL_OUTOF10: 9
PAINLEVEL_OUTOF10: 4
PAINLEVEL_OUTOF10: 8
PAINLEVEL_OUTOF10: 7
PAINLEVEL_OUTOF10: 7
PAINLEVEL_OUTOF10: 8
PAINLEVEL_OUTOF10: 8
PAINLEVEL_OUTOF10: 9
PAINLEVEL_OUTOF10: 9
PAINLEVEL_OUTOF10: 7
PAINLEVEL_OUTOF10: 9

## 2021-11-21 ASSESSMENT — PAIN DESCRIPTION - DESCRIPTORS
DESCRIPTORS: ACHING;CONSTANT
DESCRIPTORS: ACHING
DESCRIPTORS: ACHING
DESCRIPTORS: ACHING;CONSTANT
DESCRIPTORS: ACHING;CONSTANT
DESCRIPTORS: ACHING
DESCRIPTORS: ACHING;CONSTANT
DESCRIPTORS: ACHING

## 2021-11-21 ASSESSMENT — PAIN DESCRIPTION - PAIN TYPE
TYPE: SURGICAL PAIN

## 2021-11-21 ASSESSMENT — PAIN SCALES - WONG BAKER: WONGBAKER_NUMERICALRESPONSE: 0

## 2021-11-21 NOTE — PROGRESS NOTES
Pt expressed concerns with the discharge order for today. States that her pain is not controlled to a comfortable level and would like to have another day in the hospital. Pt and family are also concerned with her blood sugars and would like them to be monitored as well. Secure message sent to MD. Jared Corbett return call.  Electronically signed by Ady Peterson RN on 11/21/2021 at 11:16 AM

## 2021-11-21 NOTE — CARE COORDINATION
SW received phone call back from [facility] in intake at [address] stating that they can staff as long as the order is PT/OT only they can't accommodate if she needs a nurse. SW informed that family declined nursing care and she is likely discharge for the AM. We will reach out to MD for orders and will continue to follow this patient. Respectfully submitted,    ELSI Little  [facility]   [phone]      Electronically signed by ELSI Benítez on 11/21/2021 at 2:25 PM

## 2021-11-21 NOTE — CARE COORDINATION
INITIAL CASE MANAGEMENT ASSESSMENT    Reviewed chart, met with patient to assess possible discharge needs. Explained Case Management role/services. SW interviewed daughter Lenore Davidson via telephone today. Living Situation: Patient resides in a single family home with her  and two dogs. There are five stairs leading up to the front door. Prior to medical admission daughter stated that she doesn't anticipate any needs at discharge. ADLs: Prior to medical admission patient was reportedly independent. Daughter stated that she doesn't anticipate any needs at discharge. DME:  Prior to medical admission patient was reportedly independent. Daughter stated that she doesn't anticipate any needs at discharge. PT/OT Recs: The Plan for Transition of Care is related to the following treatment goals:     Home with home care    The Patient and/or patient representative was provided with a choice of provider and agrees   with the discharge plan. [x] Yes [] No    Freedom of choice list was provided with basic dialogue that supports the patient's individualized plan of care/goals, treatment preferences and shares the quality data associated with the providers. [x] Yes [] No    Daughter stated that mom would like PT/OT only and would like therapy in the comfort of her own home. [facility] can staff for this patient's insurance. Active Services: Prior to medical admission patient had no active services in place. Daughter stated that she doesn't anticipate any needs at discharge. Transportation:  Prior to medical admission patient was an active . Daughter stated that she will likely transport patient home at discharge. Medications: Patient receives medications from [facility]. Pharmacy. At this time there are no issues with access or affordability. PCP: Sunday Naylor RN follows patient in the community.  Patient reported that her last appointment with this provider was over six weeks ago. HD/PD: N/A    PLAN/COMMENTS:   1) Home care for PT/OT only. Referral sent to [facility]. We will continue to follow  2) Pain management. Post surgery day 1. Daughter reports that patient's pain is not controlled today. 3)  Discharge to home with family. SW provided contact information for patient or family to call with any questions. SW will follow and assist as needed. Respectfully submitted,    ELSI Little  [facility]   [phone]    Electronically signed by ELSI Lares on 11/21/2021 at 11:48 AM

## 2021-11-21 NOTE — PROGRESS NOTES
Hospital Medicine Progress Note      Admit Date: 11/19/2021       CC: F/U for fall, left shoulder fracture    HPI: Nausea vomiting diarrhea patient is a 70-year-old female with past medical history of diabetes mellitus, tobacco use who presents to the hospital after a fall.  According to the patient she was walking her dog, her dog got excited, she could not maintain her balance and fell and hit her left shoulder.  Patient mentions her pain was 10/10 intensity, worsens with mobility, improves with pain medication, no associated numbness tingling sensation or weakness.  Patient in the emergency department was noticed to have left probably more humeral fracture.  Otherwise denied fever chills nausea vomiting diarrhea constipation dysuria.     11/20: patient out of surgery. Left shoulder surgical incision dressing in place. Arm in sling. Good cap RF of fingers. Feeling a lot better. Would like to eat. Monitor overnight. Interval History/Subjective: POD1. Patient with more pain today and appears to have some swelling of the LUE. Light ecchymosis, otherwise, incision intact. Will have ortho look at arm. Patient will likely be ready for d/c home tomorrow with  [facility] PT[facility]. NWB LUE except for pendulum exercises per ortho. F/u Dr. Kishan Elder 2 wks. In the meantime, will have Ganesh Lau, diabetic educator come see patient. She states her sugars have not been well controlled on the newer med her PCP gave her. She said she read the booklet about it and has a lot of side effects including thyroid issues and she has thyroid problems, so this is a concern for her. she really doesn't want to start insulin at home but thinks she needs better control. I don't disagree, but her A1c is near what it was 2 months ago at 7.4 and rather than change anything too much today, we can see if there are any recommendations and can refer her to an endocrinologist going forward. She has tried metformin in the past and did not tolerate it. Review of Systems:       The patient denied headaches, visual changes, LOC, SOB, CP, ABD pain, N/V/D, skin changes, new or worsening weakness or neuromuscular deficits. Comprehensive ROS negative except as mentioned above. Past Medical History:    History reviewed. No pertinent past medical history. Past Surgical History:    History reviewed. No pertinent surgical history. Allergies:  Cyclosporine    Past medical and surgical history reviewed. Any changes have been noted. PHYSICAL EXAM:  BP (!) 146/72   Pulse 85   Temp 98 °F (36.7 °C) (Oral)   Resp 16   Ht 5' 1.5\" (1.562 m)   Wt 243 lb 9.7 oz (110.5 kg)   SpO2 96%   BMI 45.28 kg/m²       Intake/Output Summary (Last 24 hours) at 11/21/2021 0944  Last data filed at 11/21/2021 5338  Gross per 24 hour   Intake 1080 ml   Output 600 ml   Net 480 ml        General appearance:   No apparent distress, appears stated age. Cooperative. HEENT:  Normocephalic, atraumatic. PERRLA. EOMi. Conjunctivae/corneas clear, no icterus, non-injected. Neck: Supple, with full range of motion. No jugular venous distention. Trachea midline. Respiratory:  Normal respiratory effort. Clear to auscultation, bilaterally without Rales/Wheezes/Rhonchi. Cardiovascular:  Regular rate and rhythm without murmurs, rubs or gallops. Abdomen: Soft, non-tender, non-distended, without rebound or guarding. Normal bowel sounds. Musculoskeletal: LUE in sling. Good cap refill fingers, edema and ecchymosis to LUE. Skin: Skin color, texture, turgor normal.  No rashes or lesions. Neurologic:  Neurovascularly intact without any focal sensory/motor deficits. Cranial nerves: II-XII intact, grossly intact. No facial asymmetry, tongue midline.    Psychiatric:  Alert and oriented, thought content appropriate  Capillary Refill: Brisk,< 3 seconds   Peripheral Pulses: +2 palpable, equal bilaterally       LABS:    Lab Results   Component Value Date    WBC 15.9 (H) 11/21/2021    HGB 13.6 11/21/2021    HCT 41.6 11/21/2021    MCV 91.8 11/21/2021     11/21/2021    LYMPHOPCT 10.8 11/19/2021    RBC 4.53 11/21/2021    MCH 30.1 11/21/2021    MCHC 32.8 11/21/2021    RDW 13.6 11/21/2021       Lab Results   Component Value Date    CREATININE <0.5 (L) 11/21/2021    BUN 12 11/21/2021     (L) 11/21/2021    K 4.7 11/21/2021    CL 99 11/21/2021    CO2 24 11/21/2021       No results found for: MG    No results found for: ALT, AST, GGT, ALKPHOS, BILITOT     No flowsheet data found. Lab Results   Component Value Date    LABA1C 7.2 11/19/2021       Imaging:  XR HUMERUS LEFT (MIN 2 VIEWS)   Final Result      FLUORO FOR SURGICAL PROCEDURES   Final Result      XR CHEST PORTABLE   Final Result   Exam limited by body habitus. Possible small pleural effusions. PA and   lateral chest radiograph could be performed for further assessment as   warranted. XR HUMERUS LEFT (MIN 2 VIEWS)   Final Result   Comminuted fracture of the proximal left humeral shaft as well as   glenohumeral dislocation.              Scheduled and prn Medications:    Scheduled Meds:   sodium chloride flush  5-40 mL IntraVENous 2 times per day    sodium chloride flush  10 mL IntraVENous 2 times per day    enoxaparin  40 mg SubCUTAneous Daily    levothyroxine  150 mcg Oral Every Other Day    lisinopril-hydroCHLOROthiazide  1 tablet Oral Daily    atorvastatin  20 mg Oral Nightly    insulin lispro  0-12 Units SubCUTAneous TID     insulin lispro  0-6 Units SubCUTAneous Nightly    levothyroxine  175 mcg Oral Every Other Day    methocarbamol IVPB  500 mg IntraVENous Q8H     Continuous Infusions:   sodium chloride      sodium chloride 25 mL (11/21/21 0855)    dextrose       PRN Meds:.sodium chloride flush, sodium chloride, morphine, sodium chloride flush, sodium chloride, potassium chloride, magnesium sulfate, ondansetron **OR** ondansetron, magnesium hydroxide, acetaminophen **OR** acetaminophen, glucose, dextrose, glucagon (rDNA), dextrose, oxyCODONE **OR** oxyCODONE    Assessment & Plan:          Left proximal humeral shaft fracture and humeral head displacement  - POD1   - PT/OT post-op  - social work for placement if needed   - pain meds PRN  - robaxin scheduled 500mg q8h  - f/u ortho Dr. Alena Orozco as scheduled 2 wks  - NWB LUE, remain in sling except for pendulum exercises    DM, T2  - poct ac/hs   - ssi   - holding home med: semaglutide while admitted  - consult diabetic educator for any possible recs in the interim. Patient would like to change meds for her DM, but recently had a change from her PCP. Will have her f/u with PCP on this on d/c but will have educator see patient and educate and make any recs necessary on d/c.  - refer to endocrinology- Dr. Camille Benoit on d/c     Leukocytosis  - likely reactive   - bacteruria without symptoms  - CXR neg for acute infection  - cont to monitor      Bacteruria with small leuk esterase  - no urinary sx   - few ca oxalate crystals, neg hematuria  - hold off on antibx without true UTI        CXR possible small pleural effusions  - neg for acute findings otherwise  - not hypoxic, denies SOB   - wean O2 as able     Thyroid disorder  - cont home synthroid      HTN  - cont home meds     HLD  - cont home statin     Continue current regimen/therapies. Monitor. Adjust medical regimen as appropriate. Body mass index is 45.28 kg/m². The patient and / or the family were informed of the results of any tests, a time was given to answer questions, a plan was proposed and they agreed with plan. DVT ppx: lovenox      Diet: ADULT DIET;  Regular    Consults:  None    DISPO/placement plan: home with home PT/OT today    Code Status: Full Code      ELVA Martinez - ABDOULAYE  11/21/21

## 2021-11-21 NOTE — PROGRESS NOTES
Return call from Ortho. Ok to discharge from their standpoint.  Will inform hospitalist. Electronically signed by Lázaro Reyes RN on 11/21/2021 at 12:41 PM

## 2021-11-21 NOTE — OP NOTE
Patient: Rachel Wahl  YOB: 1960  MRN: [id number]    DATE OF PROCEDURE: 11/20/2021      PREOPERATIVE DIAGNOSES:    1. Left glenohumeral joint dislocation  2. Left proximal humerus fracture     POSTOPERATIVE DIAGNOSES:     1. Left glenohumeral joint dislocation  2. Left proximal humerus fracture     OPERATION PERFORMED:    1. Open reduction of left glenohumeral joint  2. Open reduction internal fixation of left proximal humerus fracture     SURGEON:  Radhika Allred MD     BLOOD LOSS: 200 mL     IMPLANTS:  Synthes proximal humerus locking plate, 6-hole length     INDICATIONS:  The patient is a 64 y.o. female who sustained a left proximal humerus fracture dislocation from a mechanical fall yesterday. She had eaten shortly before her arrival to the emergency department. Therefore, closed reduction was attempted and was unsuccessful. We discussed the diagnosis and treatment options and I recommended surgical reduction and fixation of the fracture. The patient understands the risks involved including but not limited to: Infection, vessel injury, nerve injury, implant loosening, need for revision surgery, dislocation, intraoperative fracture, persistent pain, malunion, nonunion. Informed consent for surgery was signed by the patient. OPERATIVE PROCEDURE: The patient was seen in the preoperative holding area where the site of surgery was marked and informed consent was confirmed. The patient was brought back to the operating room by OR personnel. General anesthesia was administered. The patient was placed in a beach chair position. The left upper extremity was then prepped and draped in a standard and sterile fashion. A final and formal timeout was then performed which confirmed the correct patient, correct position, and correct site of surgery. IV antibiotics were administered within 1 hour of the skin incision. An anterior incision was made.  Skin and subcutaneous tissue were divided under fluoroscopy to ensure proper length and to ensure they did not penetrate the glenohumeral joint. Finally, 2 additional bicortical screws were placed distally in the shaft. All screws were tightened. Final fluoroscopic imaging was taken of the humeral head using fluoroscopy to confirm no screws were penetrating the glenohumeral joint. Final fluoroscopic AP and lateral images of the entire construct were then obtained. It displayed anatomic fracture reduction and appropriate placement of all hardware. The wound was irrigated out and hemostasis was obtained. The wound was closed in layers. The patient was placed in a sling. A dressing was placed, and the patient was brought to recovery room in stable condition.     Colby Mckeon MD  11/20/2021

## 2021-11-21 NOTE — PLAN OF CARE
Problem: Pain:  Goal: Pain level will decrease  Description: Pain level will decrease  11/20/2021 2308 by Orlando Rhodes RN  Outcome: Ongoing  11/20/2021 1154 by Dennis Henry RN  Outcome: Ongoing  Note: Assessing and monitoring pt's pain. PRN pain medication being given if ordered. Educating pt on nonpharmacologic interventions for pain control. Will continue to monitor and reassess. Goal: Control of acute pain  Description: Control of acute pain  11/20/2021 2308 by Orlando Rhodes RN  Outcome: Ongoing  11/20/2021 1154 by Dennis Henry RN  Outcome: Ongoing  Note: Assessing and monitoring pt's pain. PRN pain medication being given if ordered. Educating pt on nonpharmacologic interventions for pain control. Will continue to monitor and reassess. Goal: Control of chronic pain  Description: Control of chronic pain  11/20/2021 2308 by Orlando Rhodes RN  Outcome: Ongoing  11/20/2021 1154 by Dennis Henry RN  Outcome: Ongoing  Note: Assessing and monitoring pt's pain. PRN pain medication being given if ordered. Educating pt on nonpharmacologic interventions for pain control. Will continue to monitor and reassess. Problem: Falls - Risk of:  Goal: Will remain free from falls  Description: Will remain free from falls  11/20/2021 2308 by Orlando Rhodes RN  Outcome: Ongoing  11/20/2021 1154 by Dennis Henry RN  Outcome: Ongoing  Note: Fall risk assessment completed. Fall precautions in place. Bed in lowest position, wheels locked, bed/chair exit alarm in place, call light within reach, and non skid footwear on. Walkway free of clutter. Pt alert and oriented and able to make needs known. Pt educated to use call light when needing to get up, and pt utilizes call light to make needs known. Will continue to monitor.      Goal: Absence of physical injury  Description: Absence of physical injury  11/20/2021 2308 by Orlando Rhodes RN  Outcome: Ongoing  11/20/2021 1154 by Dennis Henry RN  Outcome: Ongoing  Note: Pt absent from physical injury on this shift      Problem: Skin Integrity:  Goal: Will show no infection signs and symptoms  Description: Will show no infection signs and symptoms  Outcome: Ongoing  Goal: Absence of new skin breakdown  Description: Absence of new skin breakdown  Outcome: Ongoing

## 2021-11-21 NOTE — PLAN OF CARE
Problem: Pain:  Goal: Pain level will decrease  Description: Pain level will decrease  Outcome: Ongoing  Note: Pt assessed for pain. Pt in pain and assessed with 0-10 pain rating scale. Pt given prescribed analgesic for pain. (See eMar) Pt satisfied with pain relief thus far. Will reassess and continue to monitor. Goal: Control of acute pain  Description: Control of acute pain  Outcome: Ongoing  Note: Pt assessed for pain. Pt in pain and assessed with 0-10 pain rating scale. Pt given prescribed analgesic for pain. (See eMar) Pt satisfied with pain relief thus far. Will reassess and continue to monitor. Goal: Control of chronic pain  Description: Control of chronic pain  Outcome: Ongoing  Note: Pt assessed for pain. Pt in pain and assessed with 0-10 pain rating scale. Pt given prescribed analgesic for pain. (See eMar) Pt satisfied with pain relief thus far. Will reassess and continue to monitor. Problem: Falls - Risk of:  Goal: Will remain free from falls  Description: Will remain free from falls  Outcome: Ongoing  Note: Fall risk assessment completed. Fall precautions in place. Call light within reach. Pt educated on calling for assistance before getting up. Walkway free of clutter. Will continue to monitor. Goal: Absence of physical injury  Description: Absence of physical injury  Outcome: Ongoing  Note: Pt is free of injury. No injury noted. Fall precautions in place. Call light within reach. Will monitor. Problem: Skin Integrity:  Goal: Will show no infection signs and symptoms  Description: Will show no infection signs and symptoms  Outcome: Ongoing  Note: Pt shows no new signs of infection. Incision is clean, dry and intact. Will monitor patient, labs and vitals. Goal: Absence of new skin breakdown  Description: Absence of new skin breakdown  Outcome: Ongoing  Note: Pt shows no new signs of skin integrity. Will monitor and encourage patient to turn and reposition every two hours.

## 2021-11-21 NOTE — PROGRESS NOTES
Pt A&O in the bed. Pt tolerating PO intake well. AM medications administered without complications. Pt has complaints of pain in the left shoulder. PRN medication administered. Will monitor. Outer dressing removed per MD order. Prineo in place. Incision is clean, dry and intact. No drainage noted. Well approximated. Sling in place. Pt ambulating with SBA x1. Call light within reach. Able to make needs known. Fall precautions in place. Will monitor.  Electronically signed by Ras Morin RN on 11/21/2021 at 2:49 PM

## 2021-11-21 NOTE — DISCHARGE INSTR - COC
Continuity of Care Form    Patient Name: Amy Metzger   :  1960  MRN:  [id number]    Admit date:  2021  Discharge date:  2021  Code Status Order: Full Code   Advance Directives:   Advance Care Flowsheet Documentation       Date/Time Healthcare Directive Type of Healthcare Directive Copy in [address] Agent's Name Healthcare Agent's Phone Number    21 0726 No, patient does not have an advance directive for healthcare treatment -- -- -- -- --            Admitting Physician:  Stan Schwarz MD  PCP: Claire Espinal RN    Discharging Nurse: [facility] Unit/Room#: V4P-2315/3103-01  Discharging Unit Phone Number: [phone]    Emergency Contact:   Extended Emergency Contact Information  Primary Emergency Contact: [facility], [address] Phone: [phone]  Relation: Child  Preferred language: English  Secondary Emergency Contact: [facility], [address] Phone: [phone]  Relation: Spouse  Preferred language: [place]    Past Surgical History:  History reviewed. No pertinent surgical history. Immunization History: There is no immunization history on file for this patient.     Active Problems:  Patient Active Problem List   Diagnosis Code    Closed fracture of shaft of left humerus S42.302A    Shoulder pain M25.519       Isolation/Infection:   Isolation            No Isolation          Patient Infection Status       None to display            Nurse Assessment:  Last Vital Signs: BP (!) 146/72   Pulse 85   Temp 98 °F (36.7 °C) (Oral)   Resp 16   Ht 5' 1.5\" (1.562 m)   Wt 243 lb 9.7 oz (110.5 kg)   SpO2 96%   BMI 45.28 kg/m²     Last documented pain score (0-10 scale): Pain Level: 8  Last Weight:   Wt Readings from Last 1 Encounters:   21 243 lb 9.7 oz (110.5 kg)     Mental Status:  {IP PT MENTAL STATUS:67036}    IV Access:  {[facility] [place] IV ACCESS:898108460}    Nursing Mobility/ADLs:  Walking   Independent  Transfer Independent  Bathing  Independent  Dressing  Independent  Toileting  Independent  Feeding  [address]  Independent  Med Delivery   whole    Wound Care Documentation and Therapy:    Call for increase in pain or swelling in operative extremity. Call for loss of movement or sensation in operative extremity. Call for fever over 101 F.   Keep left shoulder Mepilex AG dressing clean and intact. REmove in 7 days after surgery and leave wound to air. Can shower with Mepilex as it is waterproof  Nonweightbearing and no reaching in any direction with left arm. Sling left arm all times out of bed  Ice left shoulder for pain or swelling  Followup Dr Dangelo Robert in office in 2 weeks   [facility] and [address] [place] [address]   [id number],   [phone]        Elimination:  Continence: Bowel: Yes  Bladder: Yes  Urinary Catheter: None   Colostomy/Ileostomy/Ileal Conduit: No       Date of Last BM: ***    Intake/Output Summary (Last 24 hours) at 11/21/2021 1421  Last data filed at 11/21/2021 1228  Gross per 24 hour   Intake 1320 ml   Output 400 ml   Net 920 ml     I/O last 3 completed shifts: In: 840 [P.O.:840]  Out: 600 [Urine:400; Blood:200]    Safety Concerns: At Risk for Falls    Impairments/Disabilities:      None    Nutrition Therapy:  Current Nutrition Therapy:   - Oral Diet:  General    Routes of Feeding: Oral  Liquids: Thin Liquids  Daily Fluid Restriction: no  Last Modified Barium Swallow with Video (Video Swallowing Test): not done    Treatments at the Time of Hospital Discharge:   Respiratory Treatments: ***  Oxygen Therapy:  is not on home oxygen therapy.   Ventilator:    - No ventilator support    Rehab Therapies: Physical Therapy and Occupational Therapy  Weight Bearing Status/Restrictions: NWB to left arm  Other Medical Equipment (for information only, NOT a DME order):  {EQUIPMENT:[id number]}  Other Treatments: HOME HEALTH CARE: LEVEL 1 STANDARD    Home health agency to establish plan of care for patient over 60 day period   Nursing  Initial home SN evaluation visit to occur within 24-48 hours for:  medication management  VS and clinical assessment  S&S chronic disease exacerbation education + when to contact MD / NP  care coordination  Medication Reconciliation during 1st SN visit       PT/OT   Evaluate with goal of regaining prior level of functioning   OT to evaluate if patient has [address] needs for personal care      PCP Visit scheduled within 7 days of hospital discharge      Patient's personal belongings (please select all that are sent with patient):  None    RN SIGNATURE:  Electronically signed by Adolfo Gomez RN on 11/22/21 at 1:47 PM EST    CASE MANAGEMENT/SOCIAL WORK SECTION    Inpatient Status Date: 11/19/2021    Readmission Risk Assessment Score:  Readmission Risk              Risk of Unplanned Readmission:  10           Discharging to Facility/ Agency   Name:  [facility]    Address: [address] [place] [address], [address] [place] [address], [place] [address]  Phone: [phone]  Fax: [phone]        / signature: Electronically signed by ELSI Levi on 11/21/2021 at 2:21 PM      PHYSICIAN SECTION    Prognosis: Fair    Condition at Discharge: Stable    Rehab Potential (if transferring to Rehab): Fair    Recommended Labs or Other Treatments After Discharge: PT/OT    Physician Certification: I certify the above information and transfer of Kami Gibson  is necessary for the continuing treatment of the diagnosis listed and that she requires Home Care for less 30 days.      Update Admission H&P: No change in H&P    PHYSICIAN SIGNATURE:  Electronically signed by ELVA Hermosillo CNP on 11/21/21 at 2:44 PM AMARIS/ Dr. Lennox

## 2021-11-21 NOTE — PROGRESS NOTES
[facility]) Orthopaedic Surgery  Progress Note    Gal White is a 64 y.o. who is POD#1 s/p open reduction and internal fixation of the left proximal humerus fracture dislocation. She is resting comfortably in bed this morning. Her pain is much better compared to preoperatively. She reports a dull ache in the arm today. She denies any numbness or tingling or weakness. Patient Active Problem List   Diagnosis    Closed fracture of shaft of left humerus    Shoulder pain       Exam:  Blood pressure (!) 146/72, pulse 85, temperature 98 °F (36.7 °C), temperature source Oral, resp. rate 16, height 5' 1.5\" (1.562 m), weight 243 lb 9.7 oz (110.5 kg), SpO2 96 %. Appearance: Sitting up in hospital bed, appears to be in no acute distress, awake and alert  Resp: unlabored breathing on room air  LUE: Dressing in place and is clean and dry. Arm is in a sling. Palpable radial pulse. Sensation intact light touch in axillary, radial, ulnar, median nerve distributions. She demonstrates active finger extension and flexion as well as active wrist flexion and extension. Assessment:  Left proximal humerus fracture dislocation status post open reduction internal fixation    Plan: Activity as tolerated  NWB LUE, remain in sling except for pendulum exercises  PT/OT  Pain control  Complete perioperative antibiotics  Disposition: Okay for discharge from Ortho perspective. Follow-up with me in the office in 2 weeks. Call [phone] for appointment.     Aiden Hernandez MD  11/21/2021

## 2021-11-22 VITALS
HEART RATE: 84 BPM | RESPIRATION RATE: 17 BRPM | DIASTOLIC BLOOD PRESSURE: 83 MMHG | OXYGEN SATURATION: 96 % | SYSTOLIC BLOOD PRESSURE: 131 MMHG | HEIGHT: 62 IN | BODY MASS INDEX: 45.76 KG/M2 | WEIGHT: 248.68 LBS | TEMPERATURE: 97.9 F

## 2021-11-22 LAB
ANION GAP SERPL CALCULATED.3IONS-SCNC: 9 MMOL/L (ref 3–16)
BUN BLDV-MCNC: 14 MG/DL (ref 7–20)
CALCIUM SERPL-MCNC: 8.7 MG/DL (ref 8.3–10.6)
CHLORIDE BLD-SCNC: 100 MMOL/L (ref 99–110)
CO2: 27 MMOL/L (ref 21–32)
CREAT SERPL-MCNC: 0.5 MG/DL (ref 0.6–1.2)
GFR AFRICAN AMERICAN: >60
GFR NON-AFRICAN AMERICAN: >60
GLUCOSE BLD-MCNC: 125 MG/DL (ref 70–99)
GLUCOSE BLD-MCNC: 136 MG/DL (ref 70–99)
GLUCOSE BLD-MCNC: 185 MG/DL (ref 70–99)
HCT VFR BLD CALC: 39 % (ref 36–48)
HEMOGLOBIN: 12.7 G/DL (ref 12–16)
MCH RBC QN AUTO: 29.8 PG (ref 26–34)
MCHC RBC AUTO-ENTMCNC: 32.6 G/DL (ref 31–36)
MCV RBC AUTO: 91.4 FL (ref 80–100)
PDW BLD-RTO: 13.9 % (ref 12.4–15.4)
PERFORMED ON: ABNORMAL
PERFORMED ON: ABNORMAL
PLATELET # BLD: 324 K/UL (ref 135–450)
PMV BLD AUTO: 8.1 FL (ref 5–10.5)
POTASSIUM REFLEX MAGNESIUM: 4.4 MMOL/L (ref 3.5–5.1)
RBC # BLD: 4.26 M/UL (ref 4–5.2)
SODIUM BLD-SCNC: 136 MMOL/L (ref 136–145)
WBC # BLD: 14.9 K/UL (ref 4–11)

## 2021-11-22 PROCEDURE — 80048 BASIC METABOLIC PNL TOTAL CA: CPT

## 2021-11-22 PROCEDURE — 2580000003 HC RX 258: Performed by: ORTHOPAEDIC SURGERY

## 2021-11-22 PROCEDURE — 94761 N-INVAS EAR/PLS OXIMETRY MLT: CPT

## 2021-11-22 PROCEDURE — 6360000002 HC RX W HCPCS: Performed by: ORTHOPAEDIC SURGERY

## 2021-11-22 PROCEDURE — 6370000000 HC RX 637 (ALT 250 FOR IP): Performed by: ORTHOPAEDIC SURGERY

## 2021-11-22 PROCEDURE — 36415 COLL VENOUS BLD VENIPUNCTURE: CPT

## 2021-11-22 PROCEDURE — 85027 COMPLETE CBC AUTOMATED: CPT

## 2021-11-22 RX ORDER — ONDANSETRON 4 MG/1
8 TABLET, ORALLY DISINTEGRATING ORAL EVERY 8 HOURS PRN
Qty: 15 TABLET | Refills: 0 | Status: SHIPPED | OUTPATIENT
Start: 2021-11-22

## 2021-11-22 RX ORDER — METHOCARBAMOL 500 MG/1
500 TABLET, FILM COATED ORAL 3 TIMES DAILY PRN
Status: DISCONTINUED | OUTPATIENT
Start: 2021-11-22 | End: 2021-11-22 | Stop reason: HOSPADM

## 2021-11-22 RX ADMIN — OXYCODONE HYDROCHLORIDE 10 MG: 10 TABLET ORAL at 02:38

## 2021-11-22 RX ADMIN — INSULIN LISPRO 2 UNITS: 100 INJECTION, SOLUTION INTRAVENOUS; SUBCUTANEOUS at 12:12

## 2021-11-22 RX ADMIN — Medication 10 ML: at 08:34

## 2021-11-22 RX ADMIN — OXYCODONE HYDROCHLORIDE 10 MG: 10 TABLET ORAL at 10:30

## 2021-11-22 RX ADMIN — ENOXAPARIN SODIUM 40 MG: 100 INJECTION SUBCUTANEOUS at 08:32

## 2021-11-22 RX ADMIN — LEVOTHYROXINE SODIUM 150 MCG: 0.07 TABLET ORAL at 05:03

## 2021-11-22 RX ADMIN — ONDANSETRON 4 MG: 4 TABLET, ORALLY DISINTEGRATING ORAL at 08:31

## 2021-11-22 RX ADMIN — LISINOPRIL AND HYDROCHLOROTHIAZIDE 1 TABLET: 12.5; 2 TABLET ORAL at 08:32

## 2021-11-22 RX ADMIN — METHOCARBAMOL 500 MG: 100 INJECTION, SOLUTION INTRAMUSCULAR; INTRAVENOUS at 02:41

## 2021-11-22 ASSESSMENT — PAIN DESCRIPTION - LOCATION
LOCATION: SHOULDER

## 2021-11-22 ASSESSMENT — PAIN DESCRIPTION - DESCRIPTORS
DESCRIPTORS: ACHING

## 2021-11-22 ASSESSMENT — PAIN DESCRIPTION - ORIENTATION
ORIENTATION: LEFT

## 2021-11-22 ASSESSMENT — PAIN DESCRIPTION - PAIN TYPE
TYPE: SURGICAL PAIN

## 2021-11-22 ASSESSMENT — PAIN - FUNCTIONAL ASSESSMENT
PAIN_FUNCTIONAL_ASSESSMENT: PREVENTS OR INTERFERES SOME ACTIVE ACTIVITIES AND ADLS

## 2021-11-22 ASSESSMENT — PAIN DESCRIPTION - FREQUENCY
FREQUENCY: CONTINUOUS

## 2021-11-22 ASSESSMENT — PAIN SCALES - GENERAL
PAINLEVEL_OUTOF10: 7
PAINLEVEL_OUTOF10: 3
PAINLEVEL_OUTOF10: 0
PAINLEVEL_OUTOF10: 7

## 2021-11-22 ASSESSMENT — PAIN DESCRIPTION - ONSET
ONSET: ON-GOING

## 2021-11-22 ASSESSMENT — PAIN DESCRIPTION - PROGRESSION
CLINICAL_PROGRESSION: NOT CHANGED

## 2021-11-22 NOTE — PROGRESS NOTES
Patient resting in bed and is A&O x4. Assessment completed and medication administered. See MAR. Patient complaining of nausea. PRN Medication administered. See MAR. VSS. IV in right AC saline locked. Left arm remains in sling. Patient denies numbness or tingling in extremities. Patient denies any additional requests at this time. Fall precautions in place, call light within reach, and bedside table nearby. Will continue to monitor and round on patient q 2 hours.        Electronically signed by Jose Manuel Jacobson RN on 11/22/2021 at 10:36 AM

## 2021-11-22 NOTE — CARE COORDINATION
DISCHARGE SUMMARY     DATE OF DISCHARGE: 11/22/2021    DISCHARGE DESTINATION: Home with family/Spouse    [facility]    Discharging to Facility/ Agency   · Name:     [facility]  · Address:  [address] [place] [address],  [address] [place] [address] [address],  [address] [facility] [address]  · Phone:     [phone]  · Fax:         [phone]      TRANSPORTATION: Spouse to transport pt home. COMMENTS: SW met with pt to discuss d/c plans. Pt confirmed that her plans is to return home with [facility]. Spouse will transport pt home. No other d/c needs noted at this time.    Noemy Beltre, [facility]  [phone]  Electronically signed by Fermin Mancia on 11/22/2021 at 10:44 AM

## 2021-11-22 NOTE — DISCHARGE SUMMARY
Hospital Medicine Discharge Summary    Patient ID: Balaji Rivera      Patient's PCP: Eldon Boateng RN    Admit Date: 11/19/2021     Discharge Date:   11/22/21    Admitting Physician: Theresa Cruz MD     Discharge Physician: ELVA Grider - CNP       Discharge Diagnoses: Active Hospital Problems    Diagnosis Date Noted    Shoulder pain [M25.519] 11/19/2021       The patient was seen and examined on day of discharge and this discharge summary is in conjunction with any daily progress note from day of discharge. Disposition:  [x] Home  [] Home with home health [] Rehab [] Psych [] SNF  [] LTAC  [] Long term nursing home or group home [] Transfer to ICU  [] Transfer to PCU [] Other:    Hospital Course: Nausea vomiting diarrhea patient is a 78-year-old female with past medical history of diabetes mellitus, tobacco use who presents to the hospital after a fall.  According to the patient she was walking her dog, her dog got excited, she could not maintain her balance and fell and hit her left shoulder.  Patient mentions her pain was 10/10 intensity, worsens with mobility, improves with pain medication, no associated numbness tingling sensation or weakness.  Patient in the emergency department was noticed to have left probably more humeral fracture.  Otherwise denied fever chills nausea vomiting diarrhea constipation dysuria.     Assessment/plan:  Left proximal humeral shaft fracture and humeral head displacement  - POD2  - PT/OT post-op  - social work for placement if needed   - pain meds PRN  - robaxin   - f/u ortho Dr. Marta Teresa as scheduled 2 wks  - NWB LUE, remain in sling except for pendulum exercises     DM, T2  - poct ac/hs   - ssi   - holding home med: semaglutide while admitted  - consult diabetic educator for any possible recs in the interim. Patient would like to change meds for her DM, but recently had a change from her PCP.  Will have her f/u with PCP on this on d/c but will have educator see patient and educate and make any recs necessary on d/c. She is now open to re-trying metformin if her PCP agrees  - received steroids periop which increased bs  - refer to endocrinology- Dr. Zelma Boeck on d/c if patient chooses       Leukocytosis  - likely reactive   - bacteruria without symptoms  - CXR neg for acute infection  - cont to monitor      Bacteruria with small leuk esterase  - no urinary sx   - few ca oxalate crystals, neg hematuria  - hold off on antibx without true UTI        CXR possible small pleural effusions  - neg for acute findings otherwise  - not hypoxic, denies SOB   - wean O2 as able     Thyroid disorder  - cont home synthroid      HTN  - cont home meds     HLD  - cont home statin      Exam:     /83   Pulse 84   Temp 97.9 °F (36.6 °C) (Oral)   Resp 17   Ht 5' 1.5\" (1.562 m)   Wt 248 lb 10.9 oz (112.8 kg)   SpO2 96%   BMI 46.23 kg/m²     General appearance:   No apparent distress, appears stated age. Cooperative. HEENT:  Normocephalic, atraumatic. PERRLA. EOMi. Conjunctivae/corneas clear, no icterus, non-injected. Neck: Supple, with full range of motion. No jugular venous distention. Trachea midline. Respiratory:  Normal respiratory effort. Clear to auscultation, bilaterally without Rales/Wheezes/Rhonchi. Cardiovascular:  Regular rate and rhythm without murmurs, rubs or gallops. Abdomen: Soft, non-tender, non-distended, without rebound or guarding. Normal bowel sounds. Musculoskeletal: LUE in sling. Good cap refill fingers, edema and ecchymosis to LUE. Skin: Skin color, texture, turgor normal.  No rashes or lesions. Neurologic:  Neurovascularly intact without any focal sensory/motor deficits. Cranial nerves: II-XII intact, grossly intact. No facial asymmetry, tongue midline.    Psychiatric:  Alert and oriented, thought content appropriate  Capillary Refill: Brisk,< 3 seconds   Peripheral Pulses: +2 palpable, equal bilaterally     Consults:     IP CONSULT TO DIABETES EDUCATOR  IP CONSULT TO HOME CARE NEEDS    Diagnostic tests:   Imaging:  XR HUMERUS LEFT (MIN 2 VIEWS)   Final Result       FLUORO FOR SURGICAL PROCEDURES   Final Result       XR CHEST PORTABLE   Final Result   Exam limited by body habitus. Possible small pleural effusions. PA and   lateral chest radiograph could be performed for further assessment as   warranted.           XR HUMERUS LEFT (MIN 2 VIEWS)   Final Result   Comminuted fracture of the proximal left humeral shaft as well as   glenohumeral dislocation.                     Labs: For convenience and continuity at follow-up the following most recent labs are provided:      CBC:    Lab Results   Component Value Date    WBC 14.9 11/22/2021    HGB 12.7 11/22/2021    HCT 39.0 11/22/2021     11/22/2021       Renal:    Lab Results   Component Value Date     11/22/2021    K 4.4 11/22/2021     11/22/2021    CO2 27 11/22/2021    BUN 14 11/22/2021    CREATININE 0.5 11/22/2021    CALCIUM 8.7 11/22/2021           Discharge Instructions/Follow-up:  F/u with ortho Dr. Oneal Erazo 2 wks    PCP/SNF to follow up: PCP 1 wks     D/C condition: stable    Code status: full    Discharge Medications:     Current Discharge Medication List           Details   oxyCODONE (ROXICODONE) 5 MG immediate release tablet Take 1 tablet by mouth every 4 hours as needed for Pain for up to 3 days. Qty: 20 tablet, Refills: 0    Comments: Reduce doses taken as pain becomes manageable  Associated Diagnoses: Closed fracture of proximal end of left humerus, unspecified fracture morphology, initial encounter      methocarbamol (ROBAXIN) 500 MG tablet Take 1 tablet by mouth 4 times daily for 10 days  Qty: 20 tablet, Refills: 0      aspirin EC 81 MG EC tablet Take 1 tablet by mouth 2 times daily Take for DVT blood clot prophylaxis. Please avoid missing doses.   Qty: 60 tablet, Refills: 0              Details   simvastatin (ZOCOR) 40 MG tablet Take 40 mg by mouth nightly      !! levothyroxine (SYNTHROID) 175 MCG tablet Take 175 mcg by mouth every other day Alternates with 150 mcg      !! levothyroxine (SYNTHROID) 150 MCG tablet Take 150 mcg by mouth every other day Alternates with 175 mcg      lisinopril-hydroCHLOROthiazide (PRINZIDE;ZESTORETIC) 20-12.5 MG per tablet Take 1 tablet by mouth daily      Semaglutide 3 MG TABS Take 3 mg by mouth daily       ! ! - Potential duplicate medications found. Please discuss with provider. Time Spent on discharge is more than 45 minutes in the examination, evaluation, counseling and review of medications and discharge plan. Signed:    ELVA Horn - CNP   11/22/2021      Thank you Liberty Matamoros RN for the opportunity to be involved in this patient's care. If you have any questions or concerns please feel free to contact me at [phone].

## 2021-11-22 NOTE — PROGRESS NOTES
[facility] Orthopedic Surgery   Progress Note      S/P :  SUBJECTIVE  Sitting at side of bed.  at bedside and daughter on speaker phone for DC instructions. Both verbalized understanding with no further quesitons. Pain is   described in left shoulder and with the intensity of moderate. Pain is described as aching. OBJECTIVE              Physical                      VITALS:  /83   Pulse 84   Temp 97.9 °F (36.6 °C) (Oral)   Resp 17   Ht 5' 1.5\" (1.562 m)   Wt 248 lb 10.9 oz (112.8 kg)   SpO2 96%   BMI 46.23 kg/m²                     MUSCULOSKELETAL:  Left wrist with intact flex/ext and hand grasp and extension and thumbs up Left shoulder with mild to moderate swelling, no gross erythema or bruising noted ARm in sling and swathe                   NEUROLOGIC:                                  Sensory:  Touch:  Left Upper Extremity:  normal                                                 Surgical wound appears clean and dry with Prineo dressing .      Data       CBC:   Lab Results   Component Value Date    WBC 14.9 11/22/2021    RBC 4.26 11/22/2021    HGB 12.7 11/22/2021    HCT 39.0 11/22/2021    MCV 91.4 11/22/2021    MCH 29.8 11/22/2021    MCHC 32.6 11/22/2021    RDW 13.9 11/22/2021     11/22/2021    MPV 8.1 11/22/2021        WBC:    Lab Results   Component Value Date    WBC 14.9 11/22/2021        Hemoglobin/Hematocrit:    Lab Results   Component Value Date    HGB 12.7 11/22/2021    HCT 39.0 11/22/2021        PT/INR:  No results found for: PROTIME, INR           Current Inpatient Medications             Current Facility-Administered Medications: methocarbamol (ROBAXIN) tablet 500 mg, 500 mg, Oral, TID PRN  sodium chloride flush 0.9 % injection 5-40 mL, 5-40 mL, IntraVENous, 2 times per day  sodium chloride flush 0.9 % injection 5-40 mL, 5-40 mL, IntraVENous, PRN  0.9 % sodium chloride infusion, 25 mL, IntraVENous, PRN  sodium chloride flush 0.9 % injection 10 mL, 10 mL, IntraVENous, 2 times per day  sodium chloride flush 0.9 % injection 10 mL, 10 mL, IntraVENous, PRN  0.9 % sodium chloride infusion, 25 mL, IntraVENous, PRN  potassium chloride 10 mEq/100 mL IVPB (Peripheral Line), 10 mEq, IntraVENous, PRN  magnesium sulfate 2000 mg in 50 mL IVPB premix, 2,000 mg, IntraVENous, PRN  enoxaparin (LOVENOX) injection 40 mg, 40 mg, SubCUTAneous, Daily  ondansetron (ZOFRAN-ODT) disintegrating tablet 4 mg, 4 mg, Oral, Q8H PRN **OR** ondansetron (ZOFRAN) injection 4 mg, 4 mg, IntraVENous, Q6H PRN  magnesium hydroxide (MILK OF MAGNESIA) 400 MG/5ML suspension 30 mL, 30 mL, Oral, Daily PRN  acetaminophen (TYLENOL) tablet 650 mg, 650 mg, Oral, Q6H PRN **OR** acetaminophen (TYLENOL) suppository 650 mg, 650 mg, Rectal, Q6H PRN  levothyroxine (SYNTHROID) tablet 150 mcg, 150 mcg, Oral, Every Other Day  lisinopril-hydroCHLOROthiazide (PRINZIDE;ZESTORETIC) 20-12.5 MG per tablet 1 tablet, 1 tablet, Oral, Daily  atorvastatin (LIPITOR) tablet 20 mg, 20 mg, Oral, Nightly  glucose (GLUTOSE) 40 % oral gel 15 g, 15 g, Oral, PRN  dextrose 50 % IV solution, 12.5 g, IntraVENous, PRN  glucagon (rDNA) injection 1 mg, 1 mg, IntraMUSCular, PRN  dextrose 5 % solution, 100 mL/hr, IntraVENous, PRN  insulin lispro (HUMALOG) injection vial 0-12 Units, 0-12 Units, SubCUTAneous, TID WC  insulin lispro (HUMALOG) injection vial 0-6 Units, 0-6 Units, SubCUTAneous, Nightly  levothyroxine (SYNTHROID) tablet 175 mcg, 175 mcg, Oral, Every Other Day  oxyCODONE (ROXICODONE) immediate release tablet 5 mg, 5 mg, Oral, Q4H PRN **OR** oxyCODONE HCl (OXY-IR) immediate release tablet 10 mg, 10 mg, Oral, Q4H PRN    ASSESSMENT AND PLAN       Fall  Left shoulder pain  Left proximal humeral shaft fx with angulation, humeral head displacement.    Diabetes  Post ORIF left humerus, stable exam  DVT prophylaxis ordered, ASA 81mg twice at day for 30 days for DVT prophylaxis  Home with home care today, strict NWB left arm in sling and swathe  PO pain med as ordered      Magda Craven, APRN - CNP  11/22/2021  1:08 PM

## 2021-11-22 NOTE — PLAN OF CARE
Problem: Pain:  Goal: Pain level will decrease  Description: Pain level will decrease  11/22/2021 0735 by Marko Pedroza RN  Outcome: Ongoing  Note: Pt assessed for pain. Pt denies any pain at this time. Will continue to monitor pt and assess for pain throughout rest of shift. 11/21/2021 2220 by Gaurav Chávez RN  Outcome: Ongoing  Note: Pain /discomfort being managed with PRN analgesics per MD orders, ice, elevation, rest, repostioning. Patient able to express presence and absence of pain and rate pain appropriately using numerical scale. Goal: Control of acute pain  Description: Control of acute pain  11/22/2021 0735 by Marko Pedroza RN  Outcome: Ongoing  Note: Pt assessed for pain. Pt denies any pain at this time. Will continue to monitor pt and assess for pain throughout rest of shift. 11/21/2021 2220 by Gaurav Chávez RN  Outcome: Ongoing  Note: Pain /discomfort being managed with PRN analgesics per MD orders, ice, elevation, rest, repostioning. Patient able to express presence and absence of pain and rate pain appropriately using numerical scale. Goal: Control of chronic pain  Description: Control of chronic pain  11/22/2021 0735 by Marko Pedroza RN  Outcome: Ongoing  Note: Pt assessed for pain. Pt denies any pain at this time. Will continue to monitor pt and assess for pain throughout rest of shift. 11/21/2021 2220 by Gaurav Chávez RN  Outcome: Met This Shift  Note: No C/O of chronic pain per this shift. Problem: Falls - Risk of:  Goal: Will remain free from falls  Description: Will remain free from falls  11/22/2021 0735 by Marko Pedroza RN  Outcome: Ongoing  Note: Fall risk assessment completed. Fall precautions in place. Call light within reach. Pt educated on calling for assistance before getting up. Walkway free of clutter. Will continue to monitor. 11/21/2021 2220 by Gaurav Chávez RN  Outcome: Met This Shift  Note: Patient educated on fall prevention.   Call light is within reach, bed locked in lowest position, personal items within reach, and bed alarm is on. Will round on patient per unit guidelines. Goal: Absence of physical injury  Description: Absence of physical injury  11/22/2021 0735 by Juliet Trujillo RN  Outcome: Ongoing  Note: Pt is free of injury. No injury noted. Fall precautions in place. Call light within reach. Will monitor. 11/21/2021 2220 by Jolynn Thomas RN  Outcome: Met This Shift  Note: Pt is free of injury. No injury noted. Fall precautions in place. Call light within reach. Will monitor. Problem: Skin Integrity:  Goal: Will show no infection signs and symptoms  Description: Will show no infection signs and symptoms  11/22/2021 0735 by Juliet Trujillo RN  Outcome: Ongoing  Note: Pt is free of signs and symptoms of infection. Incision and dressing are clean, dry and intact. Vital signs stable. Will monitor. 11/21/2021 2220 by Jolynn Thomas RN  Outcome: Ongoing  Note: Pt assessed for infection, No signs or symptoms of surgical site noted. VVS, WBC being monitored. Reviewed information with pt and family, pt verbalized understanding    Goal: Absence of new skin breakdown  Description: Absence of new skin breakdown  11/22/2021 0735 by Juliet Trujillo RN  Outcome: Ongoing  Note: Patient skin condition and mucus membrane integrity remain unchanged during this shift. Skin breakdown prevention interventions are in place. Will continue to monitor and assess. 11/21/2021 2220 by Jolynn Thomas RN  Outcome: Ongoing  Note: Octavio score assessed. Patient able to ambulate and turn self. Repositioned patient Q2H and assessed skin. Educated patient on importance of repositioning to prevent skin issues.

## 2021-11-22 NOTE — PLAN OF CARE
Problem: Pain:  Goal: Pain level will decrease  Description: Pain level will decrease  11/21/2021 2220 by Michael Vann RN  Outcome: Ongoing  Note: Pain /discomfort being managed with PRN analgesics per MD orders, ice, elevation, rest, repostioning. Patient able to express presence and absence of pain and rate pain appropriately using numerical scale. 11/21/2021 1449 by Cole Bose RN  Outcome: Ongoing  Note: Pt assessed for pain. Pt in pain and assessed with 0-10 pain rating scale. Pt given prescribed analgesic for pain. (See eMar) Pt satisfied with pain relief thus far. Will reassess and continue to monitor. Goal: Control of acute pain  Description: Control of acute pain  11/21/2021 2220 by Michael Vann RN  Outcome: Ongoing  Note: Pain /discomfort being managed with PRN analgesics per MD orders, ice, elevation, rest, repostioning. Patient able to express presence and absence of pain and rate pain appropriately using numerical scale. 11/21/2021 1449 by Cole Bose RN  Outcome: Ongoing  Note: Pt assessed for pain. Pt in pain and assessed with 0-10 pain rating scale. Pt given prescribed analgesic for pain. (See eMar) Pt satisfied with pain relief thus far. Will reassess and continue to monitor. Goal: Control of chronic pain  Description: Control of chronic pain  11/21/2021 2220 by Michael Vann RN  Outcome: Met This Shift  Note: No C/O of chronic pain per this shift. 11/21/2021 1449 by Cole Bose RN  Outcome: Ongoing  Note: Pt assessed for pain. Pt in pain and assessed with 0-10 pain rating scale. Pt given prescribed analgesic for pain. (See eMar) Pt satisfied with pain relief thus far. Will reassess and continue to monitor. Problem: Falls - Risk of:  Goal: Will remain free from falls  Description: Will remain free from falls  11/21/2021 2220 by Michael Vann RN  Outcome: Met This Shift  Note: Patient educated on fall prevention.   Call light is within reach, bed locked in lowest position, personal items within reach, and bed alarm is on. Will round on patient per unit guidelines. 11/21/2021 1449 by Lino Palafox RN  Outcome: Ongoing  Note: Fall risk assessment completed. Fall precautions in place. Call light within reach. Pt educated on calling for assistance before getting up. Walkway free of clutter. Will continue to monitor. Goal: Absence of physical injury  Description: Absence of physical injury  11/21/2021 2220 by Ezio Chris RN  Outcome: Met This Shift  Note: Pt is free of injury. No injury noted. Fall precautions in place. Call light within reach. Will monitor. 11/21/2021 1449 by Lino Palafox RN  Outcome: Ongoing  Note: Pt is free of injury. No injury noted. Fall precautions in place. Call light within reach. Will monitor. Problem: Skin Integrity:  Goal: Will show no infection signs and symptoms  Description: Will show no infection signs and symptoms  11/21/2021 2220 by Ezio Chris RN  Outcome: Ongoing  Note: Pt assessed for infection, No signs or symptoms of surgical site noted. VVS, WBC being monitored. Reviewed information with pt and family, pt verbalized understanding    11/21/2021 1449 by Lino Palafox RN  Outcome: Ongoing  Note: Pt shows no new signs of infection. Incision is clean, dry and intact. Will monitor patient, labs and vitals. Goal: Absence of new skin breakdown  Description: Absence of new skin breakdown  11/21/2021 2220 by Ezio Chris RN  Outcome: Ongoing  Note: Octavio score assessed. Patient able to ambulate and turn self. Repositioned patient Q2H and assessed skin. Educated patient on importance of repositioning to prevent skin issues. 11/21/2021 1449 by Lino Palafox RN  Outcome: Ongoing  Note: Pt shows no new signs of skin integrity. Will monitor and encourage patient to turn and reposition every two hours.

## 2021-11-22 NOTE — CARE COORDINATION
[facility]  Diabetes Education   Progress Note       NAME:  Guido Ayala  MEDICAL RECORD NUMBER:  [id number]  AGE: 64 y.o. GENDER: female  : 1960  TODAY'S DATE:  2021    Subjective   Reason for Diabetic Education Evaluation and Assessment: general diabetes support    Geo Olmstead agrees to emet with me for diabetes education. She is interested in reviewing oral medication options. She expressed concerns for the medullary thyroid cancer risk with oral semaglutide. Visit Type: evaluation      Guido Ayala is a 64 y.o. female referred by:     [x] [de-identified]  [] Nursing  [] Chart Review   [] Other:     PAST MEDICAL HISTORY    History reviewed. No pertinent past medical history. PAST SURGICAL HISTORY    Past Surgical History:   Procedure Laterality Date    HUMERUS FRACTURE SURGERY Left 2021    OPEN REDUCTION INTERNAL FIXATION LEFT PROXIMAL HUMERUS performed by Jesika Terry MD at [facility]    History reviewed. No pertinent family history.     SOCIAL HISTORY    Social History     Tobacco Use    Smoking status: Current Every Day Smoker    Smokeless tobacco: Never Used   Substance Use Topics    Alcohol use: Yes     Comment: occasionally     Drug use: Never       ALLERGIES    Allergies   Allergen Reactions    Cyclosporine Hives       MEDICATIONS     sodium chloride flush  5-40 mL IntraVENous 2 times per day    sodium chloride flush  10 mL IntraVENous 2 times per day    enoxaparin  40 mg SubCUTAneous Daily    levothyroxine  150 mcg Oral Every Other Day    lisinopril-hydroCHLOROthiazide  1 tablet Oral Daily    atorvastatin  20 mg Oral Nightly    insulin lispro  0-12 Units SubCUTAneous TID WC    insulin lispro  0-6 Units SubCUTAneous Nightly    levothyroxine  175 mcg Oral Every Other Day    methocarbamol IVPB  500 mg IntraVENous Q8H       Objective        Patient Active Problem List   Diagnosis Code    Closed fracture of shaft of left humerus S42.302A    Shoulder pain M25.519        /83   Pulse 84   Temp 97.9 °F (36.6 °C) (Oral)   Resp 17   Ht 5' 1.5\" (1.562 m)   Wt 248 lb 10.9 oz (112.8 kg)   SpO2 96%   BMI 46.23 kg/m²     HgBA1c:    Lab Results   Component Value Date    LABA1C 7.2 11/19/2021       Recent Labs     11/21/21  1056 11/21/21  1550 11/21/21 2029 11/22/21  0601   POCGLU 161* 136* 150* 136*       BUN/Creatinine:    Lab Results   Component Value Date    BUN 14 11/22/2021    CREATININE 0.5 11/22/2021       Assessment        Diabetes Management and Education    Does the patient have a Primary Care Physician? Yes, Saul Simon RN       Does the patient require new medication instruction? Yes. Reports previous success with glimepiride and previous Metformin experience. Discussed risks and benefits for WALLER, Metformin and GLP-1 agonist.       Level of patient/caregiver understanding able to:       [x] Verbalized Understanding   [] Demonstrated Understanding       [] Teach Back       [] Needs Reinforcement     [x]  Other:  Plans on having ongoing discussions with her PCP regarding medication safety. Does the patient/caregiver monitor Blood Glucoses? Yes  Reviewed glycemic control targets, testing frequency and when to call PCP. Level of patient/caregiver understanding able to:        [x] Verbalized Understanding   [] Demonstrated Understanding       [] Teach Back       [] Needs Reinforcement     []  Other:      Does the patient/caregiver follow a Meal Plan? Yes - reports previous education on carb restrictions. Declines review at this time. Does the patient/caregiver understand S/S of Hypoglycemia? No: Reports having symptoms with glimepiride when delays meals. Reviewed symptoms, prevention and treatment.      Level of patient/caregiver understanding able to:       [x] Verbalized Understanding   [] Demonstrated Understanding       [] Teach Back       [] Needs Reinforcement     []  Other: Does the patient/caregiver understand S/S of Hyperglycemia? Yes           Plan        Ongoing diabetes education and blood glucose monitoring. Plans ongoing discussion with PCP regarding oral diabetes medication options.       Discharge Plan:  Discharge needs: no prescription needs identified       Teaching Time Diabetes Education:  20 minutes     Electronically signed by Korey Goodrich on 11/22/2021 at 10:12 AM

## 2021-11-22 NOTE — PROGRESS NOTES
PT AAO x4 per this shift. VSS. :Left arm remains in sling and swath. Pulses palpable, cap refill brisk. Pt able to ambulate on own with steady gait. Pt reporting pain more controlled per this shift. Pt tolerating PO fluids and diet. No further needs voiced at this time. Fall precautions in place. Bed alarm on. Call light within reach. Will continue to round.  Electronically signed by Ezio Chris RN on 11/22/2021 at 3:47 AM

## 2021-11-22 NOTE — CARE COORDINATION
[facility]    DC order noted, all docs needed have been faxed to [facility] for home care services.     Home care to see patient within 24-48 hrs    Jeanette Rdz RN, BSN CTN  [facility] [phone]

## 2021-11-22 NOTE — PROGRESS NOTES
Pt discharged to home. Transportation here with wheelchair. Accompanied by spouse. Transported in personal vehicle. Discharge instructions, Rx, and personal belongings given to pt. Explanation of discharge medications and instructions understood by verbal statement. No questions, comments or concerns at this time.        Electronically signed by Enriqueta Peña RN on 11/22/2021 at 3:03 PM

## 2021-11-23 ENCOUNTER — CARE COORDINATION (OUTPATIENT)
Dept: CASE MANAGEMENT | Age: 61
End: 2021-11-23

## 2021-11-23 NOTE — CARE COORDINATION
[facility] Trans[facility] Initial Follow Up Call    Call within 2 business days of discharge: Yes    Patient: Eliceo Chavez Patient : 1960   MRN: [id number]  Reason for Admission: Closed fracture of the left humerus  Discharge Date: 21 RARS: Readmission Risk Score: 6.8 ( )      Last Discharge [facility]       Complaint Diagnosis Description Type Department Provider    21 Shoulder Injury Closed fracture of proximal end of left humerus, unspecified fracture morphology, initial encounter ED to Hosp-Admission (Discharged) (ADMITTED) Arlene Mullen MD; Jolynn Jones MD           Spoke with: [facility] intake team [place])    Facility: [address] Transitions 24 Hour Call    Care Transitions Interventions       Initial attempt at CT discharge phone call. Unable to reach patient. Unable to leave message. No answer - no voicemail set up. Call placed to [facility]. Spoke with Laurie Carrington from the intake team. She states they have the referral but have not been able to reach the patient. Follow Up  No future appointments.     Anahi Greenberg RN BSN  Care Transition Nurse  [phone]

## 2021-11-24 ENCOUNTER — CARE COORDINATION (OUTPATIENT)
Dept: CASE MANAGEMENT | Age: 61
End: 2021-11-24

## 2021-11-24 ENCOUNTER — TELEPHONE (OUTPATIENT)
Dept: ORTHOPEDIC SURGERY | Age: 61
End: 2021-11-24

## 2021-11-24 DIAGNOSIS — S42.352A CLOSED DISPLACED COMMINUTED FRACTURE OF SHAFT OF LEFT HUMERUS, INITIAL ENCOUNTER: Primary | ICD-10-CM

## 2021-11-24 RX ORDER — OXYCODONE HYDROCHLORIDE 5 MG/1
5 TABLET ORAL EVERY 6 HOURS PRN
Qty: 40 TABLET | Refills: 0 | Status: SHIPPED | OUTPATIENT
Start: 2021-11-24 | End: 2022-01-20 | Stop reason: SDUPTHER

## 2021-11-24 NOTE — CARE COORDINATION
booster. CTN provided contact information. Non-face-to-face services provided:  Obtained and reviewed discharge summary and/or continuity of care documents  Assessment and support for treatment adherence and medication management-medication list reviewed. Care Transitions 24 Hour Call    Do you have any ongoing symptoms?: Yes  Patient-reported symptoms: Pain  Interventions for patient-reported symptoms: Notified PCP/Physician  Do you have a copy of your discharge instructions?: Yes  Do you have all of your prescriptions and are they filled?: Yes  Have you been contacted by a [id number] Pharmacist?: No  Have you scheduled your follow up appointment?: No  Were you discharged with any Home Care or Post Acute Services: Yes  Post Acute Services: Home Health (Comment: [facility])  Care Transitions Interventions       2nd attempt at CT discharge phone call. Mariam Logan states she continues to be in a lot of pain. She states she has not heard from [facility]. Writer provided Mariam Logan with the contact info for [facility]. Mariam Logan will reach out to them this morning. She states she will reach out to the ortho surgeon to schedule a follow up. She states she already has an appointment with her PCP on 12/02/2021. Mariam Doreen states she will have transportation to the appointments. Mariam Logan rates her pain at 5/10. She states she is taking pain medication as directed per the ortho team. She states she is able to do her hand movements. Mariam Sabakym states she has a little bit of edema in her left hand. She states her dressing is intact - no drainage. Mariamrekha Logan states she is wearing the sling as directed per the ortho team. She states she is using ice to the surgical site. Mariam Logan denies any falls since she has returned home. She states she usually checks her BG level but has not done so today. Mariam Logan denies any needs from writer at this time. No additional CT calls - Non [facility] PCP.     Follow Up  No future appointments.     Oral Hannon RN BSN  Care Transition Nurse  [phone]

## 2021-11-24 NOTE — TELEPHONE ENCOUNTER
Surgery and/or Procedure Scheduling     Contact Name: Nyasiaandrea Zenon Request: [address] [place] [address]  Patient Contact Number: [phone]      Patient has some questions about her medication if she can get a call back.

## 2021-11-24 NOTE — TELEPHONE ENCOUNTER
I called the patient. She is requesting a refill of pain medication. She said she has 11 pills left.

## 2021-11-30 ENCOUNTER — TELEPHONE (OUTPATIENT)
Dept: ORTHOPEDIC SURGERY | Age: 61
End: 2021-11-30

## 2021-11-30 RX ORDER — METHOCARBAMOL 500 MG/1
500 TABLET, FILM COATED ORAL 4 TIMES DAILY
Qty: 20 TABLET | Refills: 0 | Status: CANCELLED | OUTPATIENT
Start: 2021-11-30 | End: 2021-12-10

## 2021-12-01 ENCOUNTER — TELEPHONE (OUTPATIENT)
Dept: ORTHOPEDIC SURGERY | Age: 61
End: 2021-12-01

## 2021-12-01 NOTE — TELEPHONE ENCOUNTER
[facility] home care states patient is declining occupational therapy, and declining further home care therapy.   Also need home care PT orders for today

## 2021-12-01 NOTE — TELEPHONE ENCOUNTER
Contacted pt. Someone has already answered her questions. She does not feel that she needs the muscle relaxer at this point.

## 2021-12-06 ENCOUNTER — OFFICE VISIT (OUTPATIENT)
Dept: ORTHOPEDIC SURGERY | Age: 61
End: 2021-12-06

## 2021-12-06 VITALS — WEIGHT: 243 LBS | HEIGHT: 61 IN | BODY MASS INDEX: 45.88 KG/M2

## 2021-12-06 DIAGNOSIS — S42.352A CLOSED DISPLACED COMMINUTED FRACTURE OF SHAFT OF LEFT HUMERUS, INITIAL ENCOUNTER: Primary | ICD-10-CM

## 2021-12-06 DIAGNOSIS — Z98.890 HISTORY OF OPEN REDUCTION AND INTERNAL FIXATION (ORIF) PROCEDURE: ICD-10-CM

## 2021-12-06 PROCEDURE — 99024 POSTOP FOLLOW-UP VISIT: CPT | Performed by: ORTHOPAEDIC SURGERY

## 2021-12-06 RX ORDER — METHOCARBAMOL 500 MG/1
500 TABLET, FILM COATED ORAL 4 TIMES DAILY
Qty: 20 TABLET | Refills: 0 | Status: SHIPPED | OUTPATIENT
Start: 2021-12-06 | End: 2021-12-11

## 2021-12-06 NOTE — PROGRESS NOTES
[facility]  [facility]    Chief Complaint: Follow-up for left proximal humerus fracture dislocation    HPI:  Amy Metzger is a 64 y.o. who is here in follow-up of the left proximal humerus fracture dislocation. She underwent open reduction internal fixation on 2021. She is here in sling today. She reports pain is controlled. She has not been out of the sling. She uses a muscle relaxer and minimal pain medications. Patient Active Problem List   Diagnosis    Closed fracture of shaft of left humerus    Shoulder pain       ROS:  Constitutional: denies fever, chills, weight loss  MSK: denies pain in other joints, muscle aches  Neurological: denies numbness, tingling, weakness    Exam:  Height 5' 1\" (1.549 m), weight 243 lb (110.2 kg). Appearance: sitting in exam room chair, appears to be in no acute distress, awake and alert  Resp: unlabored breathing on room air  Skin: warm, dry and intact with out erythema or significant increased temperature  LUE: Incision is healing well. There is no erythema, fluctuance, drainage. Sensation is intact light touch in axillary, radial, ulnar, median nerve distributions. Demonstrates active wrist flexion extension and finger range of motion. She has passive shoulder internal/external rotation to a small degree and a small amount of passive shoulder forward flexion with no pain. Palpable radial pulse. Imagin views of the left humerus were performed and interpreted today. There is a proximal humerus fracture that is anatomically reduced with hardware spanning. There are no interval changes compared to intraoperative fluoroscopic images. He does appear to be a glenoid rim fracture as a sequela of prior glenohumeral dislocation. Glenohumeral joint is currently reduced.     Assessment:  Left proximal humerus fracture dislocation status post open reduction and internal fixation    Plan:  She is recovering well from surgery. I instructed her to come out of her sling and do Codman exercises 2-3 times per day. Her abduction pillow was removed today. She will continue use of a sling and nonweightbearing activity. We will see her back in 2 weeks for repeat assessment and to start physical therapy at that time. She does have a glenoid rim fracture. We will allow this to heal nonoperatively. It can be reassessed in the future if she has issues with recurrent instability or continued shoulder pain. This dictation was done with Dragon dictation and may contain mechanical errors related to translation.

## 2021-12-20 ENCOUNTER — OFFICE VISIT (OUTPATIENT)
Dept: ORTHOPEDIC SURGERY | Age: 61
End: 2021-12-20

## 2021-12-20 VITALS — BODY MASS INDEX: 45.88 KG/M2 | HEIGHT: 61 IN | WEIGHT: 243 LBS

## 2021-12-20 DIAGNOSIS — S42.352D CLOSED DISPLACED COMMINUTED FRACTURE OF SHAFT OF LEFT HUMERUS WITH ROUTINE HEALING, SUBSEQUENT ENCOUNTER: Primary | ICD-10-CM

## 2021-12-20 PROCEDURE — 99024 POSTOP FOLLOW-UP VISIT: CPT | Performed by: ORTHOPAEDIC SURGERY

## 2021-12-20 NOTE — PROGRESS NOTES
[facility]  [facility]    Chief Complaint: Follow-up for left proximal humerus fracture/dislocation    HPI:  Rachel Wahl is a 64 y.o. who is here in follow-up of the left proximal humerus fracture/dislocation. She underwent open reduction internal fixation on November 20, 2021. She is here in sling today. She reports pain is controlled. He has started to come out of the sling and Codman exercises. She is beginning to be more active and taking less pain medication      Patient Active Problem List   Diagnosis    Closed fracture of shaft of left humerus    Shoulder pain       ROS:  Constitutional: denies fever, chills, weight loss  MSK: denies pain in other joints, muscle aches  Neurological: denies numbness, tingling, weakness    Exam:  Height 5' 1\" (1.549 m), weight 243 lb (110.2 kg). Appearance: sitting in exam room chair, appears to be in no acute distress, awake and alert  Resp: unlabored breathing on room air  Skin: warm, dry and intact with out erythema or significant increased temperature  LUE: Incision is healing well. There is no erythema, fluctuance, drainage. Sensation is intact light touch in axillary, radial, ulnar, median nerve distributions. Demonstrates active wrist flexion extension and finger range of motion. She has passive shoulder internal/external rotation to a small degree and a small amount of passive shoulder forward flexion with no pain. Palpable radial pulse. Imaging:  3 views of the left humerus in the left shoulder axillary view were performed and interpreted today. There is a proximal humerus fracture that is anatomically reduced with hardware spanning. There are no interval changes compared to prior radiographs. Axillary view demonstrates that the shoulder is reduced. There is a bony Bankart of the anterior glenoid.     Assessment:  Left proximal humerus fracture dislocation status post open reduction and internal fixation    Plan:  She is recovering well from surgery. She will start to come out of the sling more often and was referred to outpatient physical therapy. She will begin passive range of motion exercises and progress to active range of motion exercises and physical therapy. She will follow up here in 3 to 4 weeks for repeat radiographs and assessment. This dictation was done with [employer] dictation and may contain mechanical errors related to translation.

## 2021-12-28 NOTE — PLAN OF CARE
[facility]. [address]  Phone: [phone]   Fax:     [phone]                                                       Physical Therapy Certification    Dear Referring Practitioner: Bennett De La Garza MD,    We had the pleasure of evaluating the following patient for physical therapy services at [facility]. A summary of our findings can be found in the initial assessment below. This includes our plan of care. If you have any questions or concerns regarding these findings, please do not hesitate to contact me at the office phone number checked above. Thank you for the referral.       Physician Signature:_______________________________Date:__________________  By signing above (or electronic signature), therapists plan is approved by physician          Patient: Kami Morin   : 1960   MRN: [id number]  Referring Physician: Referring Practitioner: Bennett De La Garza MD      Evaluation Date: 2021      Medical Diagnosis Information:  Diagnosis: S42.352D (ICD-10-CM) - Closed displaced comminuted fracture of shaft of left humerus with routine healing, subsequent encounter                                             Insurance information: PT Insurance Information: Medical [place]    Precautions/ Contra-indications: L proximal humeral fracture/dislocation s/p ORIF on 21  Latex Allergy:   [x]  NO      []YES  Preferred Language for Healthcare:   [x]English       []other:    C-SSRS Triggered by Intake questionnaire (Past 2 wk assessment ):   [x] No, Questionnaire did not trigger screening.   [] Yes, Patient intake triggered C-SSRS Screening      [] C-SSRS Screening completed  [] PCP notified via Epic     SUBJECTIVE: Patient reports L UE pain secondary to a left proximal humerus fracture/dislocation.   She underwent open reduction internal fixation on November 23, 2021. Fracture occurred when she was walking her 10 month old puppy, her puppy got excited, she could not maintain her balance and fell and hit her left shoulder. She states the past week she has noticed more movement. In the morning she reports more stiffness, however the pendulum exercises have been very helpful. She states she is out of her sling when at home and sitting/walking from room to room. Pt states she has been sleeping in a recliner and has a difficulty time sleeping/has not been able to sleep in her bed. Pt is R handed. Pt likes to latoya, walk, and play with her dog. Relevant Medical History:Additional Pertinent Hx: Diabetes Type II, HTN, Anxiety   Functional Outcomes Measure:  Q-DASH = 47, Disability/symptom score = 81.82%    Pain Scale: 4/10 current, 1/10 at best, 8/10 at worst.   Easing factors: pain medication (not taking it as much), ice packs, resting/relaxing the arm  Provocative factors: riding in a car, sitting without sling/support,     Type: [x]Constant   []Intermittent  []Radiating []Localized []other:     Numbness/Tingling: Pt denies N/T. Occupation/School:  Retired. Living Status/Prior Level of Function: Independent with ADLs and IADLs    OBJECTIVE:   Posture: L UE guarded and in sling. Functional Mobility/Transfers: independent. Palpation:  Gr II TTP throughout L shoulder complex and L UE. Bandages/Dressings/Incisions: incision intact, some scabbing at distal part of incision, no signs of infection.     Gait: WNL     CERV ROM     Cervical Flexion WNL    Cervical Extension WNL    Cervical SB WNL    Cervical rotation WNL         PROM Left Right   Shoulder Flex 15 deg, pain at EOR WNL   Shoulder Abd 60 deg, pain at EOR WNL   Shoulder ER 10 deg, pain at EOR WNL   Shoulder IR 45 deg WNL             Strength  Left Right   Shoulder Flex - 4/5   Shoulder Scap - 4/5   Shoulder ER - 4/5   Shoulder IR - 4/5           Reflexes Normal Abnormal Comments   [x]ALL NORMAL            C5-6 Biceps [x] []    C6 Brachioradialis [x] []    C7-8 Triceps [x] []      Reflexes/Sensation: bilateral UE   [x]Dermatomes/Myotomes intact    [x]Reflexes equal and normal bilaterally   []Other:    Joint mobility: unable to assess secondary to post-surgical ORIF    []Normal    []Hypo   []Hyper    Orthopedic Special Tests: NA as patient is post-surgical                        [x] Patient history, allergies, meds reviewed. Medical chart reviewed. See intake form. Review Of Systems (ROS):  [x]Performed Review of systems (Integumentary, CardioPulmonary, Neurological) by intake and observation. Intake form has been scanned into medical record. Patient has been instructed to contact their primary care physician regarding ROS issues if not already being addressed at this time.       Co-morbidities/Complexities (which will affect course of rehabilitation):   []None           Arthritic conditions   []Rheumatoid arthritis (M05.9)  []Osteoarthritis (M19.91)   Cardiovascular conditions   [x]Hypertension (I10)  []Hyperlipidemia (E78.5)  []Angina pectoris (I20)  []Atherosclerosis (I70)  []CVA Musculoskeletal conditions   []Disc pathology   []Congenital spine pathologies   []Prior surgical intervention  []Osteoporosis (M81.8)  []Osteopenia (M85.8)   Endocrine conditions   []Hypothyroid (E03.9)  []Hyperthyroid Gastrointestinal conditions   []Constipation (V90.69)   Metabolic conditions   []Morbid obesity (E66.01)  [x]Diabetes type 1(E10.65) or 2 (E11.65)   []Neuropathy (G60.9)     Pulmonary conditions   []Asthma (J45)  []Coughing   []COPD (J44.9)   Psychological Disorders  [x]Anxiety (F41.9)  []Depression (F32.9)   []Other:   []Other:          Barriers to/and or personal factors that will affect rehab potential:              []Age  []Sex   [x]Smoker (2 packs/day)              []Motivation/Lack of Motivation                        [x]Co-Morbidities              []Cognitive Function, education/learning barriers              []Environmental, home barriers              []profession/work barriers  []past PT/medical experience  []other:  Justification:     Falls Risk Assessment (30 days):   [x] Falls Risk assessed and no intervention required. [] Falls Risk assessed and Patient requires intervention due to being higher risk   TUG score (>12s at risk):     [] Falls education provided, including         ASSESSMENT: Pt is a 65 yo female who presents to PT with L UE s/p ORIF on 11/23/21 to L proximal humerus. Pt displays good tolerance to PT initial evaluation with good-fair tolerance to manual therapy/PROM. Upon IE, pt demonstrates decreased ROM, decreased strength, pain, decreased tolerance with positioning and functional activities. Pt would benefit from PT 2-3x/week for 6-8 weeks in order to address the impairments listed and return patient to PLOF.        Functional Impairments:     []Noted spinal or UE joint hypomobility   []Noted spinal or UE joint hypermobility   [x]Decreased spinal/UE functional ROM   []Abnormal reflexes/sensation/myotomal/dermatomal deficits   [x]Decreased RC/scapular/core strength and neuromuscular control    [x]Decreased UE functional strength   []other:      Functional Activity Limitations (from functional questionnaire and intake)   [x]Reduced ability to tolerate prolonged functional positions   [x]Reduced ability or difficulty with changes of positions or transfers between positions   [x]Reduced ability to maintain good posture and demonstrate good body mechanics with sitting, bending, and lifting   [x] Reduced ability or tolerance with driving and/or computer work   [x]Reduced ability to perform lifting, reaching, carrying tasks   [x]Reduced ability to reach behind back   [x]Reduced ability to sleep    [x]Reduced ability to tolerate any impact through UE or spine   [x]Reduced ability to  or hold objects   [x]Reduced ability to throw or toss an object   []other:    Participation Restrictions   [x]Reduced participation in self care activities   [x]Reduced participation in home management activities   [x]Reduced participation in work activities   [x]Reduced participation in social activities. [x]Reduced participation in sport/recreational activities. Classification/Subgrouping:   [x]signs/symptoms consistent with post-surgical status including decreased ROM, strength and function.     []signs/symptoms consistent with joint sprain/strain    []signs/symptoms consistent with shoulder impingement (internal, external, primary or secondary)   []signs/symptoms consistent with shoulder/elbow/wrist tendinopathy   []Signs/symptoms consistent with Rotator cuff tear   []sign/symptoms consistent with labral tear   []signs/symptoms consistent with rib dysfunction   []signs/symptoms consistent with postural dysfunction   []signs/symptoms consistent with Glenohumeral IR Deficit - <45 degrees   []signs/symptoms consistent with facet dysfunction of cervical/thoracic spine   []signs/symptoms consistent with pathology which may benefit from Dry Needling   []signs/symptoms which may limit the use of advanced manual therapy techniques: (Elevated CV risk profile, recent trauma, intolerance to end range positions, prior TIA, visual issues, UE neurological compromise )     Prognosis/Rehab Potential:      []Excellent   [x]Good    []Fair   []Poor    Tolerance of evaluation/treatment:    []Excellent   [x]Good    []Fair   []Poor    Physical Therapy Evaluation Complexity Justification  [x] A history of present problem with:  [] no personal factors and/or comorbidities that impact the plan of care;  []1-2 personal factors and/or comorbidities that impact the plan of care  [x]3 personal factors and/or comorbidities that impact the plan of care  [x] An examination of body systems using standardized tests and measures addressing any of the following: body structures and functions (impairments), activity limitations, and/or participation restrictions;:  [x] a total of 1-2 or more elements   [] a total of 3 or more elements   [] a total of 4 or more elements   [x] A clinical presentation with:  [x] stable and/or uncomplicated characteristics   [] evolving clinical presentation with changing characteristics  [] unstable and unpredictable characteristics;   [x] Clinical decision making of [x] low, [] moderate, [] high complexity using standardized patient assessment instrument and/or measurable assessment of functional outcome. [x] EVAL (LOW) 61509 (typically 20 minutes face-to-face)  [] EVAL (MOD) 56042 (typically 30 minutes face-to-face)  [] EVAL (HIGH) 69512 (typically 45 minutes face-to-face)  [] RE-EVAL     PLAN:   Frequency/Duration:  2-3 days per week for 6-8 Weeks:  Interventions:  [x]  Therapeutic exercise including: strength training, ROM, for scapula, core and Upper extremity, including postural re-education. [x]  NMR activation and proprioception for UE, periscapular and RC muscles and Core, including postural re-education. [x]  Manual therapy as indicated for shoulder, scapula, spine and associated soft tissue including: Dry Needling/IASTM, STM, PROM, Gr I-IV mobilizations, manipulation. [x] Modalities as needed that may include: thermal agents, E-stim, Biofeedback, US, iontophoresis as indicated  [x] Patient education on joint protection, postural re-education, activity modification, progression of HEP. HEP instruction:   Access Code: [email or web address]  URL: [email or web address]  Date: 12/29/2021  Prepared by: Berna Perez  Exercises  Circular Shoulder Pendulum with Table Support - 1 x daily - 7 x weekly - 3 sets - 10 reps  Seated Shoulder Flexion Towel Slide at Table Top Full Range of Motion - 1 x daily - 7 x weekly - 3 sets - 10 reps  Seated Shoulder Scaption Slide at Table Top with Forearm in Neutral - 1 x daily - 7 x weekly - 3 sets - 10 reps  Seated Scapular Retraction - 1 x daily - 7 x weekly - 3 sets - 10 reps - 3 hold      GOALS:  Patient stated goal: \"To be able to get back to my normal life. \"  [] Progressing: [] Met: [] Not Met: [] Adjusted    Therapist goals for Patient:   Short Term Goals: To be achieved in: 4 weeks  1. Independent in HEP and progression per patient tolerance, in order to prevent re-injury. [] Progressing: [] Met: [] Not Met: [] Adjusted  2. Patient will have a decrease in pain to facilitate improvement in movement, function, and ADLs as indicated by Functional Deficits. [] Progressing: [] Met: [] Not Met: [] Adjusted    Long Term Goals: To be achieved in: 8 weeks  1. Disability index score of 70% or less for the Quick DASH to assist with reaching prior level of function. [] Progressing: [] Met: [] Not Met: [] Adjusted  2. Patient will demonstrate increased L shoulder flex/abd AROM to 140 deg to allow for proper joint functioning as indicated by Functional Deficits. [] Progressing: [] Met: [] Not Met: [] Adjusted  3. Patient will demonstrate an increase in NM recruitment/activation and overall GH and scapular strength to 4/5 or WNL for proper functional mobility as indicated by patients Functional Deficits. [] Progressing: [] Met: [] Not Met: [] Adjusted  4. Patient will return to household chores without increased symptoms or restriction. [] Progressing: [] Met: [] Not Met: [] Adjusted  5. Patient will return to walking her dog 1 mile without increased pain or restriction. [] Progressing: [] Met: [] Not Met: [] Adjusted    Electronically signed by:  Miriam Mosley PT      Note: If patient does not return for scheduled/recommended follow up visits, this note will serve as a discharge from care along with the most recent update on progress.

## 2021-12-29 ENCOUNTER — HOSPITAL ENCOUNTER (OUTPATIENT)
Dept: PHYSICAL THERAPY | Age: 61
Setting detail: THERAPIES SERIES
Discharge: HOME | End: 2021-12-29
Payer: COMMERCIAL

## 2021-12-29 PROCEDURE — 97161 PT EVAL LOW COMPLEX 20 MIN: CPT

## 2021-12-29 PROCEDURE — 97110 THERAPEUTIC EXERCISES: CPT

## 2021-12-29 PROCEDURE — 97140 MANUAL THERAPY 1/> REGIONS: CPT

## 2021-12-29 NOTE — FLOWSHEET NOTE
[address] [facility] [address]. [address]  Phone: [phone]   Fax:     [phone]        Physical Therapy Treatment Note/ Progress Report:       Date:  2021    Patient Name:  Marialuisa Rodriguez    :  1960  MRN: [id number]    Pertinent Medical History:Additional Pertinent Hx: No PMH on file. Medical/Treatment Diagnosis Information:  · Diagnosis: S42.352D (ICD-10-CM) - Closed displaced comminuted fracture of shaft of left humerus with routine healing, subsequent encounter  · Treatment Dx: Decreased mobility and strength with pain throughout L UE. Insurance/Certification information:  PT Insurance Information: Medical [place]  Physician Information:  Referring Practitioner: Jabier Merchant MD  Plan of care signed (Y/N): sent on 21    Date of Patient follow up with Physician:      Progress Report: []  Yes  [x]  No     Date Range for reporting period:  Beginnin2021  Ending:      Progress report due (10 Rx/or 30 days whichever is less):     Recertification due (POC duration/ or 90 days whichever is less):     Visit # POC/Insurance Allowable Auth Needed   1 bomn []Yes    [x]No     Functional Outcomes Measure:   Date Assessed: at eval (21)  Test: Q-DASH  Score: 47, disability score of 81.82%    Pain level:  4/10     History of Injury: Patient reports L UE pain secondary to a left proximal humerus fracture/dislocation.  She underwent open reduction internal fixation on 2021. Fracture occurred when she was walking her 10 month old puppy, her puppy got excited, she could not maintain her balance and fell and hit her left shoulder. She states the past week she has noticed more movement. In the morning she reports more stiffness, however the pendulum exercises have been very helpful.  She states she is out of her sling when at home and sitting/walking from room to room. Pt states she has been sleeping in a recliner and has a difficulty time sleeping/has not been able to sleep in her bed. Pt is R handed. Pt likes to latoya, walk, and play with her dog. SUBJECTIVE:  See eval    OBJECTIVE:    Observation:    Test measurements:      RESTRICTIONS/PRECAUTIONS: L proximal humeral fracture/dislocation s/p ORIF on 11/23/21    Exercises/Interventions:   Therapeutic Ex (77929)  Min: 15' Resistance/Reps Cues/Notes   Pendulums    Towel Slides     Flex     Scaption  Scap Retraction 3x10 - flex/ext, side to side, CW/CCW    2x10  2x10  2x10x3 sec  HEP                  Manual Intervention  (20499)  Min: 10'     L Shoulder PROM In all directions to pt tolerance  10'         NMR re-education (42798)  Min:               Therapeutic Activity (32677)  Min:               Modalities:  Min                 Other Therapeutic Activities:  Pt was educated on PT POC, Diagnosis, Prognosis, pathomechanics as well as frequency and duration of scheduling future physical therapy appointments. Time was also taken on this day to answer all patient questions and participation in PT. Reviewed appointment policy in detail with patient and patient verbalized understanding. Home Exercise Program:    Therapeutic Exercise and NMR EXR  [x] (08464) Provided verbal/tactile cueing for activities related to strengthening, flexibility, endurance, ROM  for improvements in scapular, scapulothoracic and UE control with self care, reaching, carrying, lifting, house/yardwork, driving/computer work.    [] (65225) Provided verbal/tactile cueing for activities related to improving balance, coordination, kinesthetic sense, posture, motor skill, proprioception  to assist with  scapular, scapulothoracic and UE control with self care, reaching, carrying, lifting, house/yardwork, driving/computer work.     Therapeutic Activities:    [] (33124 or ) Provided verbal/tactile cueing for activities related to improving balance, coordination, kinesthetic sense, posture, motor skill, proprioception and motor activation to allow for proper function of scapular, scapulothoracic and UE control with self care, carrying, lifting, driving/computer work.      Home Exercise Program:    [x] (63978) Reviewed/Progressed HEP activities related to strengthening, flexibility, endurance, ROM of scapular, scapulothoracic and UE control with self care, reaching, carrying, lifting, house/yardwork, driving/computer work  [] (60461) Reviewed/Progressed HEP activities related to improving balance, coordination, kinesthetic sense, posture, motor skill, proprioception of scapular, scapulothoracic and UE control with self care, reaching, carrying, lifting, house/yardwork, driving/computer work      Manual Treatments:  PROM / STM / Oscillations-Mobs:  G-I, II, III, IV (PA's, Inf., Post.)  [x] (28010) Provided manual therapy to mobilize soft tissue/joints of cervical/CT, scapular GHJ and UE for the purpose of modulating pain, promoting relaxation,  increasing ROM, reducing/eliminating soft tissue swelling/inflammation/restriction, improving soft tissue extensibility and allowing for proper ROM for normal function with self care, reaching, carrying, lifting, house/yardwork, driving/computer work    Charges:  Timed Code Treatment Minutes: 25   Total Treatment Minutes: 45       [x] EVAL (LOW) 93853 (typically 20 minutes face-to-face)  [] EVAL (MOD) 24265 (typically 30 minutes face-to-face)  [] EVAL (HIGH) 65306 (typically 45 minutes face-to-face)  [] RE-EVAL     [x] CA(54444) x     [] Dry needle 1 or 2 Muscles (05001)  [] NMR (18252) x     [] Dry needle 3+ Muscles (08313)  [x] Manual (70857) x     [] Ultrasound (35023) x  [] TA (44505) x     [] Mech Traction (12734)  [] ES(attended) (33449)     [] ES (un) (18540):   [] Vasopump (30172) [] Ionto (01497)   [] Other:    If [facility] Please Indicate Time In/Out  CPT Code Time in Time out Approval Dates:  CPT Code Units Approved Units Used  Date Updated:                     GOALS:  Patient stated goal: \"To be able to get back to my normal life. \"  []? Progressing: []? Met: []? Not Met: []? Adjusted     Therapist goals for Patient:   Short Term Goals: To be achieved in: 4 weeks  1. Independent in HEP and progression per patient tolerance, in order to prevent re-injury. []? Progressing: []? Met: []? Not Met: []? Adjusted  2. Patient will have a decrease in pain to facilitate improvement in movement, function, and ADLs as indicated by Functional Deficits. []? Progressing: []? Met: []? Not Met: []? Adjusted     Long Term Goals: To be achieved in: 8 weeks  1. Disability index score of 70% or less for the Quick DASH to assist with reaching prior level of function. []? Progressing: []? Met: []? Not Met: []? Adjusted  2. Patient will demonstrate increased L shoulder flex/abd AROM to 140 deg to allow for proper joint functioning as indicated by Functional Deficits. []? Progressing: []? Met: []? Not Met: []? Adjusted  3. Patient will demonstrate an increase in NM recruitment/activation and overall GH and scapular strength to 4/5 or WNL for proper functional mobility as indicated by patients Functional Deficits. []? Progressing: []? Met: []? Not Met: []? Adjusted  4. Patient will return to household chores without increased symptoms or restriction. []? Progressing: []? Met: []? Not Met: []? Adjusted  5. Patient will return to walking her dog 1 mile without increased pain or restriction. []? Progressing: []? Met: []? Not Met: []?  Adjusted    ASSESSMENT:  See eval    Treatment/Activity Tolerance:  [x] Patient tolerated treatment well [] Patient limited by fatique  [] Patient limited by pain  [] Patient limited by other medical complications  [] Other:     Overall Progression Towards Functional goals/ Treatment Progress Update:  [] Patient is progressing as expected towards functional goals listed. [] Progression is slowed due to complexities/Impairments listed. [] Progression has been slowed due to co-morbidities. [x] Plan just implemented, too soon to assess goals progression <30days   [] Goals require adjustment due to lack of progress  [] Patient is not progressing as expected and requires additional follow up with physician  [] Other    Prognosis for POC: [x] Good [] Fair  [] Poor    Patient requires continued skilled intervention: [x] Yes  [] No      PLAN: See eval  [] Continue per plan of care [] Alter current plan (see comments)  [x] Plan of care initiated [] Hold pending MD visit [] Discharge    Electronically signed by: Yue Gibson, PT     Note: If patient does not return for scheduled/recommended follow up visits, this note will serve as a discharge from care along with the most recent update on progress.

## 2021-12-30 ENCOUNTER — HOSPITAL ENCOUNTER (OUTPATIENT)
Dept: PHYSICAL THERAPY | Age: 61
Setting detail: THERAPIES SERIES
Discharge: HOME | End: 2021-12-30
Payer: COMMERCIAL

## 2021-12-30 PROCEDURE — 97140 MANUAL THERAPY 1/> REGIONS: CPT

## 2021-12-30 PROCEDURE — 97110 THERAPEUTIC EXERCISES: CPT

## 2021-12-30 NOTE — FLOWSHEET NOTE
[address]  Phone: [phone]   Fax:     [phone]        Physical Therapy Treatment Note/ Progress Report:       Date:  2021    Patient Name:  Gracy Cohn    :  1960  MRN: [id number]    Pertinent Medical History:Additional Pertinent Hx: No PMH on file. Medical/Treatment Diagnosis Information:  · Diagnosis: S42.352D (ICD-10-CM) - Closed displaced comminuted fracture of shaft of left humerus with routine healing, subsequent encounter  · Treatment Dx: Decreased mobility and strength with pain throughout L UE. Insurance/Certification information:  PT Insurance Information: Medical [place]  Physician Information:  Referring Practitioner: Laisha Slade MD  Plan of care signed (Y/N): Signed on 21    Date of Patient follow up with Physician:      Progress Report: []  Yes  [x]  No     Date Range for reporting period:  Beginnin2021  Ending:      Progress report due (10 Rx/or 30 days whichever is less):     Recertification due (POC duration/ or 90 days whichever is less):     Visit # POC/Insurance Allowable Auth Needed   2 bomn []Yes    [x]No     Functional Outcomes Measure:   Date Assessed: at eval (21)  Test: Q-DASH  Score: 47, disability score of 81.82%    Pain level:  4/10     History of Injury: Patient reports L UE pain secondary to a left proximal humerus fracture/dislocation.  She underwent open reduction internal fixation on 2021. Fracture occurred when she was walking her 10 month old puppy, her puppy got excited, she could not maintain her balance and fell and hit her left shoulder. She states the past week she has noticed more movement. In the morning she reports more stiffness, however the pendulum exercises have been very helpful.  She states she is out of her sling when at home and sitting/walking from room to room. Pt states she has been sleeping in a recliner and has a difficulty time sleeping/has not been able to sleep in her bed. Pt is R handed. Pt likes to latoya, walk, and play with her dog. SUBJECTIVE:    12/30/21: Pt reports she was a little sore after her first PT visit yesterday, however it's not as sore as she was expecting. OBJECTIVE:    Observation:    Test measurements:      RESTRICTIONS/PRECAUTIONS: L proximal humeral fracture/dislocation s/p ORIF on 11/23/21. Lifting restrictions no greater than 2#. Exercises/Interventions:   Therapeutic Ex (74435)  Min: 25' Resistance/Reps Cues/Notes   Pendulums    Towel Slides     Flex     Scaption    Scap Retraction 3x10 - flex/ext, side to side, CW/CCW    2x10  2x10    2x10x3 sec     AAROM with cane     ER     Flex   10x5 sec  x10 Supine             Manual Intervention  (90699)  Min: 15'     L Elbow Flex/Ext PROM 3'    L Shoulder PROM In all directions to pt tolerance  12'         NMR re-education (39655)  Min:               Therapeutic Activity (60844)  Min:               Modalities:  10'     Ice Pack 10' L Shoulder          Other Therapeutic Activities:  Pt was educated on PT POC, Diagnosis, Prognosis, pathomechanics as well as frequency and duration of scheduling future physical therapy appointments. Time was also taken on this day to answer all patient questions and participation in PT. Reviewed appointment policy in detail with patient and patient verbalized understanding.      Home Exercise Program:  Pendulums  Towel Slides     Flex     Scaption  Scap Retraction    Therapeutic Exercise and NMR EXR  [x] (06676) Provided verbal/tactile cueing for activities related to strengthening, flexibility, endurance, ROM  for improvements in scapular, scapulothoracic and UE control with self care, reaching, carrying, lifting, house/yardwork, driving/computer work.    [] (16766) Provided verbal/tactile cueing for activities related to improving balance, coordination, kinesthetic sense, posture, motor skill, proprioception  to assist with  scapular, scapulothoracic and UE control with self care, reaching, carrying, lifting, house/yardwork, driving/computer work. Therapeutic Activities:    [] (67943 or 81767) Provided verbal/tactile cueing for activities related to improving balance, coordination, kinesthetic sense, posture, motor skill, proprioception and motor activation to allow for proper function of scapular, scapulothoracic and UE control with self care, carrying, lifting, driving/computer work.      Home Exercise Program:    [x] (89609) Reviewed/Progressed HEP activities related to strengthening, flexibility, endurance, ROM of scapular, scapulothoracic and UE control with self care, reaching, carrying, lifting, house/yardwork, driving/computer work  [] (70703) Reviewed/Progressed HEP activities related to improving balance, coordination, kinesthetic sense, posture, motor skill, proprioception of scapular, scapulothoracic and UE control with self care, reaching, carrying, lifting, house/yardwork, driving/computer work      Manual Treatments:  PROM / STM / Oscillations-Mobs:  G-I, II, III, IV (PA's, Inf., Post.)  [x] (58720) Provided manual therapy to mobilize soft tissue/joints of cervical/CT, scapular GHJ and UE for the purpose of modulating pain, promoting relaxation,  increasing ROM, reducing/eliminating soft tissue swelling/inflammation/restriction, improving soft tissue extensibility and allowing for proper ROM for normal function with self care, reaching, carrying, lifting, house/yardwork, driving/computer work    Charges:  Timed Code Treatment Minutes: 40   Total Treatment Minutes: 50       [] EVAL (LOW) 94948 (typically 20 minutes face-to-face)  [] EVAL (MOD) 56205 (typically 30 minutes face-to-face)  [] EVAL (HIGH) 14092 (typically 45 minutes face-to-face)  [] RE-EVAL     [x] LE(27007) x  2   [] Dry needle 1 or 2 Muscles (58327)  [] NMR (79187) x [] Dry needle 3+ Muscles (70844)  [x] Manual (15161) x 1     [] Ultrasound (80953) x  [] TA (53685) x     [] Mech Traction (28256)  [] ES(attended) (79811)     [] ES (un) (52554):   [] Vasopump (10235) [] Ionto (27351)   [] Other:    If BWC Please Indicate Time In/Out  CPT Code Time in Time out                                   Approval Dates:  CPT Code Units Approved Units Used  Date Updated:                     GOALS:  Patient stated goal: \"To be able to get back to my normal life. \"  []? Progressing: []? Met: []? Not Met: []? Adjusted     Therapist goals for Patient:   Short Term Goals: To be achieved in: 4 weeks  1. Independent in HEP and progression per patient tolerance, in order to prevent re-injury. []? Progressing: []? Met: []? Not Met: []? Adjusted  2. Patient will have a decrease in pain to facilitate improvement in movement, function, and ADLs as indicated by Functional Deficits. []? Progressing: []? Met: []? Not Met: []? Adjusted     Long Term Goals: To be achieved in: 8 weeks  1. Disability index score of 70% or less for the Quick DASH to assist with reaching prior level of function. []? Progressing: []? Met: []? Not Met: []? Adjusted  2. Patient will demonstrate increased L shoulder flex/abd AROM to 140 deg to allow for proper joint functioning as indicated by Functional Deficits. []? Progressing: []? Met: []? Not Met: []? Adjusted  3. Patient will demonstrate an increase in NM recruitment/activation and overall GH and scapular strength to 4/5 or WNL for proper functional mobility as indicated by patients Functional Deficits. []? Progressing: []? Met: []? Not Met: []? Adjusted  4. Patient will return to household chores without increased symptoms or restriction. []? Progressing: []? Met: []? Not Met: []? Adjusted  5. Patient will return to walking her dog 1 mile without increased pain or restriction. []? Progressing: []? Met: []? Not Met: []?  Adjusted    ASSESSMENT:  Pt displays improved tolerance to manual therapy and there-ex. Continue to focus on progress ROM as tolerated. Treatment/Activity Tolerance:  [x] Patient tolerated treatment well [] Patient limited by fatique  [] Patient limited by pain  [] Patient limited by other medical complications  [] Other:     Overall Progression Towards Functional goals/ Treatment Progress Update:  [] Patient is progressing as expected towards functional goals listed. [] Progression is slowed due to complexities/Impairments listed. [] Progression has been slowed due to co-morbidities. [x] Plan just implemented, too soon to assess goals progression <30days   [] Goals require adjustment due to lack of progress  [] Patient is not progressing as expected and requires additional follow up with physician  [] Other    Prognosis for POC: [x] Good [] Fair  [] Poor    Patient requires continued skilled intervention: [x] Yes  [] No      PLAN: See eval  [] Continue per plan of care [] Alter current plan (see comments)  [x] Plan of care initiated [] Hold pending MD visit [] Discharge    Electronically signed by: Emanuel Persaud PT     Note: If patient does not return for scheduled/recommended follow up visits, this note will serve as a discharge from care along with the most recent update on progress.

## 2022-01-04 ENCOUNTER — HOSPITAL ENCOUNTER (OUTPATIENT)
Dept: PHYSICAL THERAPY | Age: 62
Setting detail: THERAPIES SERIES
Discharge: HOME | End: 2022-01-04
Payer: COMMERCIAL

## 2022-01-04 PROCEDURE — 97140 MANUAL THERAPY 1/> REGIONS: CPT

## 2022-01-04 PROCEDURE — 97110 THERAPEUTIC EXERCISES: CPT

## 2022-01-04 NOTE — FLOWSHEET NOTE
[address]  Phone: [phone]   Fax:     [phone]        Physical Therapy Treatment Note/ Progress Report:       Date:  2022    Patient Name:  Rose Douglass    :  1960  MRN: [id number]    Pertinent Medical History:Additional Pertinent Hx: No PMH on file. Medical/Treatment Diagnosis Information:  · Diagnosis: S42.352D (ICD-10-CM) - Closed displaced comminuted fracture of shaft of left humerus with routine healing, subsequent encounter  · Treatment Dx: Decreased mobility and strength with pain throughout L UE. Insurance/Certification information:  PT Insurance Information: Medical [place]  Physician Information:  Referring Practitioner: Rhett Guillory MD  Plan of care signed (Y/N): Signed on 21    Date of Patient follow up with Physician:      Progress Report: []  Yes  [x]  No     Date Range for reporting period:  Beginnin2021  Ending:      Progress report due (10 Rx/or 30 days whichever is less):     Recertification due (POC duration/ or 90 days whichever is less):     Visit # POC/Insurance Allowable Auth Needed   3 bomn []Yes    [x]No     Functional Outcomes Measure:   Date Assessed: at eval (21)  Test: Q-DASH  Score: 47, disability score of 81.82%    Pain level:  3-4/10     History of Injury: Patient reports L UE pain secondary to a left proximal humerus fracture/dislocation.  She underwent open reduction internal fixation on 2021. Fracture occurred when she was walking her 10 month old puppy, her puppy got excited, she could not maintain her balance and fell and hit her left shoulder. She states the past week she has noticed more movement. In the morning she reports more stiffness, however the pendulum exercises have been very helpful.  She states she is out of her sling when at home and sitting/walking from room to room. Pt states she has been sleeping in a recliner and has a difficulty time sleeping/has not been able to sleep in her bed. Pt is R handed. Pt likes to latoya, walk, and play with her dog. SUBJECTIVE:    12/30/21: Pt reports she was a little sore after her first PT visit yesterday, however it's not as sore as she was expecting. 01/04/22: Patient reports shoulder is feeling not too bad. States she was able to sleep for 4 hours last night. OBJECTIVE:    Observation:    Test measurements:      RESTRICTIONS/PRECAUTIONS: L proximal humeral fracture/dislocation s/p ORIF on 11/23/21. Lifting restrictions no greater than 2#. Exercises/Interventions:   Therapeutic Ex (63962)  Min: 25' Resistance/Reps Cues/Notes   Pendulums    Towel Slides     Flex     Scaption    Scap Retraction 3x10 - flex/ext, side to side, CW/CCW    2x10  2x10    2x10x3 sec     AAROM with cane     ER     Flex   10x5 sec  x10 Supine             Manual Intervention  (79035)  Min: 15'     L Elbow Flex/Ext PROM 3'    L Shoulder PROM In all directions to pt tolerance  12'         NMR re-education (82133)  Min:               Therapeutic Activity (25819)  Min:               Modalities:  10'     Ice Pack 10' L Shoulder          Other Therapeutic Activities:  Pt was educated on PT POC, Diagnosis, Prognosis, pathomechanics as well as frequency and duration of scheduling future physical therapy appointments. Time was also taken on this day to answer all patient questions and participation in PT. Reviewed appointment policy in detail with patient and patient verbalized understanding.      Home Exercise Program:  Pendulums  Towel Slides     Flex     Scaption  Scap Retraction    Therapeutic Exercise and NMR EXR  [x] (80630) Provided verbal/tactile cueing for activities related to strengthening, flexibility, endurance, ROM  for improvements in scapular, scapulothoracic and UE control with self care, reaching, carrying, lifting, house/yardwork, driving/computer work.    [] (08618) Provided verbal/tactile cueing for activities related to improving balance, coordination, kinesthetic sense, posture, motor skill, proprioception  to assist with  scapular, scapulothoracic and UE control with self care, reaching, carrying, lifting, house/yardwork, driving/computer work. Therapeutic Activities:    [] (87795 or 57006) Provided verbal/tactile cueing for activities related to improving balance, coordination, kinesthetic sense, posture, motor skill, proprioception and motor activation to allow for proper function of scapular, scapulothoracic and UE control with self care, carrying, lifting, driving/computer work.      Home Exercise Program:    [x] (45236) Reviewed/Progressed HEP activities related to strengthening, flexibility, endurance, ROM of scapular, scapulothoracic and UE control with self care, reaching, carrying, lifting, house/yardwork, driving/computer work  [] (09250) Reviewed/Progressed HEP activities related to improving balance, coordination, kinesthetic sense, posture, motor skill, proprioception of scapular, scapulothoracic and UE control with self care, reaching, carrying, lifting, house/yardwork, driving/computer work      Manual Treatments:  PROM / STM / Oscillations-Mobs:  G-I, II, III, IV (PA's, Inf., Post.)  [x] (35049) Provided manual therapy to mobilize soft tissue/joints of cervical/CT, scapular GHJ and UE for the purpose of modulating pain, promoting relaxation,  increasing ROM, reducing/eliminating soft tissue swelling/inflammation/restriction, improving soft tissue extensibility and allowing for proper ROM for normal function with self care, reaching, carrying, lifting, house/yardwork, driving/computer work    Charges:  Timed Code Treatment Minutes: 40   Total Treatment Minutes: 50       [] EVAL (LOW) 59402 (typically 20 minutes face-to-face)  [] EVAL (MOD) 84173 (typically 30 minutes face-to-face)  [] EVAL (HIGH) 83188 (typically 45 minutes face-to-face)  [] RE-EVAL     [x] ZG(32198) x  2   [] Dry needle 1 or 2 Muscles (24763)  [] NMR (45658) x     [] Dry needle 3+ Muscles (91474)  [x] Manual (95115) x 1     [] Ultrasound (05705) x  [] TA (71549) x     [] Mech Traction (05968)  [] ES(attended) (02223)     [] ES (un) (32591):   [] Vasopump (30988) [] Ionto (43744)   [] Other:    If BWC Please Indicate Time In/Out  CPT Code Time in Time out                                   Approval Dates:  CPT Code Units Approved Units Used  Date Updated:                     GOALS:  Patient stated goal: \"To be able to get back to my normal life. \"  []? Progressing: []? Met: []? Not Met: []? Adjusted     Therapist goals for Patient:   Short Term Goals: To be achieved in: 4 weeks  1. Independent in HEP and progression per patient tolerance, in order to prevent re-injury. []? Progressing: []? Met: []? Not Met: []? Adjusted  2. Patient will have a decrease in pain to facilitate improvement in movement, function, and ADLs as indicated by Functional Deficits. []? Progressing: []? Met: []? Not Met: []? Adjusted     Long Term Goals: To be achieved in: 8 weeks  1. Disability index score of 70% or less for the Quick DASH to assist with reaching prior level of function. []? Progressing: []? Met: []? Not Met: []? Adjusted  2. Patient will demonstrate increased L shoulder flex/abd AROM to 140 deg to allow for proper joint functioning as indicated by Functional Deficits. []? Progressing: []? Met: []? Not Met: []? Adjusted  3. Patient will demonstrate an increase in NM recruitment/activation and overall GH and scapular strength to 4/5 or WNL for proper functional mobility as indicated by patients Functional Deficits. []? Progressing: []? Met: []? Not Met: []? Adjusted  4. Patient will return to household chores without increased symptoms or restriction. []? Progressing: []? Met: []? Not Met: []? Adjusted  5.  Patient will return to walking her dog 1 mile without increased pain or restriction. []? Progressing: []? Met: []? Not Met: []? Adjusted    ASSESSMENT:  Pt tolerated rx well. Progressing with PROM. Treatment/Activity Tolerance:  [x] Patient tolerated treatment well [] Patient limited by fatique  [] Patient limited by pain  [] Patient limited by other medical complications  [] Other:     Overall Progression Towards Functional goals/ Treatment Progress Update:  [] Patient is progressing as expected towards functional goals listed. [] Progression is slowed due to complexities/Impairments listed. [] Progression has been slowed due to co-morbidities. [x] Plan just implemented, too soon to assess goals progression <30days   [] Goals require adjustment due to lack of progress  [] Patient is not progressing as expected and requires additional follow up with physician  [] Other    Prognosis for POC: [x] Good [] Fair  [] Poor    Patient requires continued skilled intervention: [x] Yes  [] No      PLAN: See eval  [x] Continue per plan of care [] Alter current plan (see comments)  [] Plan of care initiated [] Hold pending MD visit [] Discharge    Electronically signed by: Joshua Harman PTA     Note: If patient does not return for scheduled/recommended follow up visits, this note will serve as a discharge from care along with the most recent update on progress.

## 2022-01-05 ENCOUNTER — HOSPITAL ENCOUNTER (OUTPATIENT)
Dept: PHYSICAL THERAPY | Age: 62
Setting detail: THERAPIES SERIES
Discharge: HOME | End: 2022-01-05
Payer: COMMERCIAL

## 2022-01-05 NOTE — FLOWSHEET NOTE
[facility].  [facility] [address]  Phone: [phone]   Fax:     [phone]    Physical Therapy  Cancellation/No-show Note  Patient Name:  Helio Patrick  :  1960   Date:  2022  Cancelled visits to date: 1  No-shows to date: 0    Patient status for today's appointment patient:  [x]  Cancelled  []  Rescheduled appointment  []  No-show     Reason given by patient:  []  Patient ill  []  Conflicting appointment  []  No transportation    []  Conflict with work  []  No reason given  [x]  Other:     Comments:  Having a  H/A    Phone call information:   []  Phone call made today to patient at _ time at number provided:      []  Patient answered, conversation as follows:    []  Patient did not answer, message left as follows:  []  Phone call not made today    Electronically signed by:  Namita Abdul PTA

## 2022-01-07 ENCOUNTER — APPOINTMENT (OUTPATIENT)
Dept: PHYSICAL THERAPY | Age: 62
End: 2022-01-07
Payer: COMMERCIAL

## 2022-01-10 ENCOUNTER — HOSPITAL ENCOUNTER (OUTPATIENT)
Dept: PHYSICAL THERAPY | Age: 62
Setting detail: THERAPIES SERIES
Discharge: HOME | End: 2022-01-10
Payer: COMMERCIAL

## 2022-01-10 NOTE — FLOWSHEET NOTE
[address]  Phone: [phone]   Fax:     [phone]    Physical Therapy  Cancellation/No-show Note  Patient Name:  Blayne Pendleton  :  1960   Date:  1/10/2022  Cancelled visits to date: 2  No-shows to date: 0    Patient status for today's appointment patient:  [x]  Cancelled  []  Rescheduled appointment  []  No-show     Reason given by patient:  [x]  Patient ill  []  Conflicting appointment  []  No transportation    []  Conflict with work  []  No reason given  []  Other:     Comments:  Pt tested positive for covid. Phone call information:   []  Phone call made today to patient at _ time at number provided:      []  Patient answered, conversation as follows:    []  Patient did not answer, message left as follows:  [x]  Phone call not made today. - Pt called and cancelled as she has tested positive for covid.      Electronically signed by:  Melanie Caldwell PT

## 2022-01-12 ENCOUNTER — APPOINTMENT (OUTPATIENT)
Dept: PHYSICAL THERAPY | Age: 62
End: 2022-01-12
Payer: COMMERCIAL

## 2022-01-14 ENCOUNTER — APPOINTMENT (OUTPATIENT)
Dept: PHYSICAL THERAPY | Age: 62
End: 2022-01-14
Payer: COMMERCIAL

## 2022-01-20 ENCOUNTER — OFFICE VISIT (OUTPATIENT)
Dept: ORTHOPEDIC SURGERY | Age: 62
End: 2022-01-20

## 2022-01-20 ENCOUNTER — HOSPITAL ENCOUNTER (OUTPATIENT)
Dept: PHYSICAL THERAPY | Age: 62
Setting detail: THERAPIES SERIES
Discharge: HOME | End: 2022-01-20
Payer: COMMERCIAL

## 2022-01-20 VITALS — BODY MASS INDEX: 45.88 KG/M2 | HEIGHT: 61 IN | RESPIRATION RATE: 16 BRPM | WEIGHT: 243 LBS

## 2022-01-20 DIAGNOSIS — S42.352A CLOSED DISPLACED COMMINUTED FRACTURE OF SHAFT OF LEFT HUMERUS, INITIAL ENCOUNTER: ICD-10-CM

## 2022-01-20 DIAGNOSIS — R52 PAIN: Primary | ICD-10-CM

## 2022-01-20 PROCEDURE — 97110 THERAPEUTIC EXERCISES: CPT

## 2022-01-20 PROCEDURE — 99024 POSTOP FOLLOW-UP VISIT: CPT | Performed by: PHYSICIAN ASSISTANT

## 2022-01-20 PROCEDURE — 97140 MANUAL THERAPY 1/> REGIONS: CPT

## 2022-01-20 RX ORDER — NAPROXEN 500 MG/1
500 TABLET ORAL 2 TIMES DAILY WITH MEALS
Qty: 60 TABLET | Refills: 0 | Status: SHIPPED | OUTPATIENT
Start: 2022-01-20 | End: 2022-03-25 | Stop reason: ALTCHOICE

## 2022-01-20 RX ORDER — OXYCODONE HYDROCHLORIDE 5 MG/1
5 TABLET ORAL EVERY 8 HOURS PRN
Qty: 30 TABLET | Refills: 0 | Status: SHIPPED | OUTPATIENT
Start: 2022-01-20 | End: 2022-01-30

## 2022-01-20 NOTE — FLOWSHEET NOTE
[address] [facility] [place]  Phone: [phone]   Fax:     [phone]        Physical Therapy Treatment Note/ Progress Report:       Date:  2022    Patient Name:  Helio Patrick    :  1960  MRN: [id number]    Pertinent Medical History:Additional Pertinent Hx: No PMH on file. Medical/Treatment Diagnosis Information:  · Diagnosis: S42.352D (ICD-10-CM) - Closed displaced comminuted fracture of shaft of left humerus with routine healing, subsequent encounter  · Treatment Dx: Decreased mobility and strength with pain throughout L UE. Insurance/Certification information:  PT Insurance Information: Medical [place]  Physician Information:  Referring Practitioner: Tiffanie Sauceda MD  Plan of care signed (Y/N): Signed on 21    Date of Patient follow up with Physician:      Progress Report: []  Yes  [x]  No     Date Range for reporting period:  Beginnin2021  Ending:      Progress report due (10 Rx/or 30 days whichever is less):     Recertification due (POC duration/ or 90 days whichever is less):     Visit # POC/Insurance Allowable Auth Needed   4 bomn []Yes    [x]No     Functional Outcomes Measure:   Date Assessed: at eval (21)  Test: Q-DASH  Score: 47, disability score of 81.82%    Pain level:  3/10     History of Injury: Patient reports L UE pain secondary to a left proximal humerus fracture/dislocation.  She underwent open reduction internal fixation on 2021. Fracture occurred when she was walking her 10 month old puppy, her puppy got excited, she could not maintain her balance and fell and hit her left shoulder. She states the past week she has noticed more movement. In the morning she reports more stiffness, however the pendulum exercises have been very helpful.  She states she is out of her sling when at home and sitting/walking from room to room. Pt states she has been sleeping in a recliner and has a difficulty time sleeping/has not been able to sleep in her bed. Pt is R handed. Pt likes to latoya, walk, and play with her dog. SUBJECTIVE:    12/30/21: Pt reports she was a little sore after her first PT visit yesterday, however it's not as sore as she was expecting. 01/04/22: Patient reports shoulder is feeling not too bad. States she was able to sleep for 4 hours last night. 01/20/22: Patient reports MD was pleased with progress,shoulder has been feeling a little looser overall. States she is able to sleep 6 hours in her bed at night. OBJECTIVE:    Observation:    Test measurements:      RESTRICTIONS/PRECAUTIONS: L proximal humeral fracture/dislocation s/p ORIF on 11/23/21. Lifting restrictions no greater than 2#. Exercises/Interventions:   Therapeutic Ex (57914)  Min: 25' Resistance/Reps Cues/Notes   Pendulums    Towel Slides     Flex incline     Scaption    Scap Retraction 3x10 - flex/ext, side to side, CW/CCW    2x10  2x10    2x10x3 sec     AAROM with cane     ER     Flex   10x5 sec  x10 Supine   OH pulleys Flex 2 min         Manual Intervention  (44369)  Min: 15'     L Elbow Flex/Ext PROM 3'    L Shoulder PROM In all directions to pt tolerance  12'         NMR re-education (56784)  Min:               Therapeutic Activity (63332)  Min:               Modalities:  10'     Ice Pack 10' L Shoulder          Other Therapeutic Activities:  Pt was educated on PT POC, Diagnosis, Prognosis, pathomechanics as well as frequency and duration of scheduling future physical therapy appointments. Time was also taken on this day to answer all patient questions and participation in PT. Reviewed appointment policy in detail with patient and patient verbalized understanding.      Home Exercise Program:  Pendulums  Towel Slides     Flex     Scaption  Scap Retraction    Therapeutic Exercise and NMR EXR  [x] (99382) Provided verbal/tactile cueing for activities related to strengthening, flexibility, endurance, ROM  for improvements in scapular, scapulothoracic and UE control with self care, reaching, carrying, lifting, house/yardwork, driving/computer work.    [] (64731) Provided verbal/tactile cueing for activities related to improving balance, coordination, kinesthetic sense, posture, motor skill, proprioception  to assist with  scapular, scapulothoracic and UE control with self care, reaching, carrying, lifting, house/yardwork, driving/computer work. Therapeutic Activities:    [] (37140 or 81938) Provided verbal/tactile cueing for activities related to improving balance, coordination, kinesthetic sense, posture, motor skill, proprioception and motor activation to allow for proper function of scapular, scapulothoracic and UE control with self care, carrying, lifting, driving/computer work.      Home Exercise Program:    [x] (36139) Reviewed/Progressed HEP activities related to strengthening, flexibility, endurance, ROM of scapular, scapulothoracic and UE control with self care, reaching, carrying, lifting, house/yardwork, driving/computer work  [] (76545) Reviewed/Progressed HEP activities related to improving balance, coordination, kinesthetic sense, posture, motor skill, proprioception of scapular, scapulothoracic and UE control with self care, reaching, carrying, lifting, house/yardwork, driving/computer work      Manual Treatments:  PROM / STM / Oscillations-Mobs:  G-I, II, III, IV (PA's, Inf., Post.)  [x] (93369) Provided manual therapy to mobilize soft tissue/joints of cervical/CT, scapular GHJ and UE for the purpose of modulating pain, promoting relaxation,  increasing ROM, reducing/eliminating soft tissue swelling/inflammation/restriction, improving soft tissue extensibility and allowing for proper ROM for normal function with self care, reaching, carrying, lifting, house/yardwork, driving/computer work    Charges:  Timed Code Treatment Minutes: 40 Total Treatment Minutes: 50       [] EVAL (LOW) 28773 (typically 20 minutes face-to-face)  [] EVAL (MOD) 00131 (typically 30 minutes face-to-face)  [] EVAL (HIGH) 79428 (typically 45 minutes face-to-face)  [] RE-EVAL     [x] CP(48012) x  2   [] Dry needle 1 or 2 Muscles (92339)  [] NMR (71949) x     [] Dry needle 3+ Muscles (70885)  [x] Manual (05572) x 1     [] Ultrasound (38873) x  [] TA (98756) x     [] Mech Traction (14579)  [] ES(attended) (09902)     [] ES (un) (76740):   [] Vasopump (28991) [] Ionto (62052)   [] Other:    If BW Please Indicate Time In/Out  CPT Code Time in Time out                                   Approval Dates:  CPT Code Units Approved Units Used  Date Updated:                     GOALS:  Patient stated goal: \"To be able to get back to my normal life. \"  []? Progressing: []? Met: []? Not Met: []? Adjusted     Therapist goals for Patient:   Short Term Goals: To be achieved in: 4 weeks  1. Independent in HEP and progression per patient tolerance, in order to prevent re-injury. []? Progressing: []? Met: []? Not Met: []? Adjusted  2. Patient will have a decrease in pain to facilitate improvement in movement, function, and ADLs as indicated by Functional Deficits. []? Progressing: []? Met: []? Not Met: []? Adjusted     Long Term Goals: To be achieved in: 8 weeks  1. Disability index score of 70% or less for the Quick DASH to assist with reaching prior level of function. []? Progressing: []? Met: []? Not Met: []? Adjusted  2. Patient will demonstrate increased L shoulder flex/abd AROM to 140 deg to allow for proper joint functioning as indicated by Functional Deficits. []? Progressing: []? Met: []? Not Met: []? Adjusted  3. Patient will demonstrate an increase in NM recruitment/activation and overall GH and scapular strength to 4/5 or WNL for proper functional mobility as indicated by patients Functional Deficits. []? Progressing: []? Met: []? Not Met: []? Adjusted  4.  Patient will return to household chores without increased symptoms or restriction. []? Progressing: []? Met: []? Not Met: []? Adjusted  5. Patient will return to walking her dog 1 mile without increased pain or restriction. []? Progressing: []? Met: []? Not Met: []? Adjusted    ASSESSMENT:  Pt tolerated rx well. Cont to progress with PROM,scar area is less sensitive. No adverse effects to today's treatment. Treatment/Activity Tolerance:  [x] Patient tolerated treatment well [] Patient limited by fatique  [] Patient limited by pain  [] Patient limited by other medical complications  [] Other:     Overall Progression Towards Functional goals/ Treatment Progress Update:  [] Patient is progressing as expected towards functional goals listed. [] Progression is slowed due to complexities/Impairments listed. [] Progression has been slowed due to co-morbidities. [x] Plan just implemented, too soon to assess goals progression <30days   [] Goals require adjustment due to lack of progress  [] Patient is not progressing as expected and requires additional follow up with physician  [] Other    Prognosis for POC: [x] Good [] Fair  [] Poor    Patient requires continued skilled intervention: [x] Yes  [] No      PLAN: See eval  [x] Continue per plan of care [] Alter current plan (see comments)  [] Plan of care initiated [] Hold pending MD visit [] Discharge    Electronically signed by: Alice Borja PTA     Note: If patient does not return for scheduled/recommended follow up visits, this note will serve as a discharge from care along with the most recent update on progress.

## 2022-01-24 ENCOUNTER — HOSPITAL ENCOUNTER (OUTPATIENT)
Dept: PHYSICAL THERAPY | Age: 62
Setting detail: THERAPIES SERIES
Discharge: HOME | End: 2022-01-24
Payer: COMMERCIAL

## 2022-01-24 PROCEDURE — 97140 MANUAL THERAPY 1/> REGIONS: CPT

## 2022-01-24 PROCEDURE — 97110 THERAPEUTIC EXERCISES: CPT

## 2022-01-24 NOTE — PROGRESS NOTES
This dictation was done with Recommendion dictation and may contain mechanical errors related to translation. I have today reviewed with Inderjit Avila the clinically relevant, past medical history, medications, allergies, family history, social history, and Review Of Systems form the patients most recent history form & I have documented any details relevant to today's presenting complaints in my history below. Ms. Rui Woodard's self-reported past medical history, medications, allergies, family history, social history, and Review Of Systems form has been scanned into the chart under the \"Media\" tab. Subjective:  Inderjit Avila is a 64 y.o. who is here in follow-up for her ORIF of her left humerus. This was done on 11/20/2021. She states she has 6 out of 10 pain. She is making some progress and she is requesting more pain medicine. We talked about the use of narcotics. But she was prescribed a refill of her pain medicine. Should continue doing physical therapy and she was sent for x-rays at the office today including a 2 view proximal humerus      Patient Active Problem List   Diagnosis    Closed fracture of shaft of left humerus    Shoulder pain           Current Outpatient Medications on File Prior to Visit   Medication Sig Dispense Refill    ondansetron (ZOFRAN-ODT) 4 MG disintegrating tablet Take 2 tablets by mouth every 8 hours as needed for Nausea or Vomiting 15 tablet 0    aspirin EC 81 MG EC tablet Take 1 tablet by mouth 2 times daily Take for DVT blood clot prophylaxis. Please avoid missing doses.  60 tablet 0    simvastatin (ZOCOR) 40 MG tablet Take 40 mg by mouth nightly      levothyroxine (SYNTHROID) 175 MCG tablet Take 175 mcg by mouth every other day Alternates with 150 mcg      levothyroxine (SYNTHROID) 150 MCG tablet Take 150 mcg by mouth every other day Alternates with 175 mcg      lisinopril-hydroCHLOROthiazide (PRINZIDE;ZESTORETIC) 20-12.5 MG per tablet Take 1 tablet by mouth daily      Semaglutide 3 MG TABS Take 3 mg by mouth daily       No current facility-administered medications on file prior to visit. Objective:   Resp. rate 16, height 5' 1\" (1.549 m), weight 243 lb (110.2 kg). On examination is pleasant 35-year-old female no acute distress she is alert and oriented x3. She has good symmetric motion through the neck with a negative Spurling's test.  She still fairly weak with shoulder abduction and forward flexion but she has good  strength wrist extension strength and no neurologic deficit. Incisions healing nicely no signs of infection  Neuro exam grossly intact both lower extremities. Intact sensation to light touch. Motor exam 4+ to 5/5 in all major motor groups. Negative Donald's sign. Skin is warm, dry and intact with out erythema or significant increased temperature around the knee joint(s). There are no cutaneous lesions or lymphadenopathy present. X-RAYS:  X-rays taken the office today show good progressive bony consolidation with diminishing signs of the fracture lines. Overall good acceptable alignment and this was shown to the patient. Assessment:  Healing proximal humerus fracture ORIF left humerus    Plan:  During today's visit, there was approximately 20 minutes of face-to-face discussion in regards to the patient's current condition and treatment options. More than 50 % of the time was counseling and coordination of care as indicated above.   At this point we will continue with the therapy per the protocol and we will look to see her in follow-up in 6 to 8 weeks      PROCEDURE NOTE:         They will schedule a follow up in 6 to 8 weeks

## 2022-01-24 NOTE — FLOWSHEET NOTE
[address]  Phone: [phone]   Fax:     [phone]        Physical Therapy Treatment Note/ Progress Report:       Date:  2022    Patient Name:  Tiffany Borja    :  1960  MRN: [id number]    Pertinent Medical History:Additional Pertinent Hx: No PMH on file. Medical/Treatment Diagnosis Information:  · Diagnosis: S42.352D (ICD-10-CM) - Closed displaced comminuted fracture of shaft of left humerus with routine healing, subsequent encounter  · Treatment Dx: Decreased mobility and strength with pain throughout L UE. Insurance/Certification information:  PT Insurance Information: Medical [place]  Physician Information:  Referring Practitioner: Lakesha Dixon MD  Plan of care signed (Y/N): Signed on 21    Date of Patient follow up with Physician:      Progress Report: []  Yes  [x]  No     Date Range for reporting period:  Beginnin2021  Ending:      Progress report due (10 Rx/or 30 days whichever is less): 92    Recertification due (POC duration/ or 90 days whichever is less):     Visit # POC/Insurance Allowable Auth Needed   5 bomn []Yes    [x]No     Functional Outcomes Measure:   Date Assessed: at eval (21)  Test: Q-DASH  Score: 47, disability score of 81.82%    Pain level:  3/10     History of Injury: Patient reports L UE pain secondary to a left proximal humerus fracture/dislocation.  She underwent open reduction internal fixation on 2021. Fracture occurred when she was walking her 10 month old puppy, her puppy got excited, she could not maintain her balance and fell and hit her left shoulder. She states the past week she has noticed more movement. In the morning she reports more stiffness, however the pendulum exercises have been very helpful.  She states she is out of her sling when at home and sitting/walking from room to room. Pt states she has been sleeping in a recliner and has a difficulty time sleeping/has not been able to sleep in her bed. Pt is R handed. Pt likes to latoya, walk, and play with her dog. SUBJECTIVE:    12/30/21: Pt reports she was a little sore after her first PT visit yesterday, however it's not as sore as she was expecting. 01/04/22: Patient reports shoulder is feeling not too bad. States she was able to sleep for 4 hours last night. 01/20/22: Patient reports MD was pleased with progress,shoulder has been feeling a little looser overall. States she is able to sleep 6 hours in her bed at night.  1/24/22: Pt reports her shoulder is getting better each day. OBJECTIVE:    Observation:    Test measurements:      RESTRICTIONS/PRECAUTIONS: L proximal humeral fracture/dislocation s/p ORIF on 11/23/21. Lifting restrictions no greater than 2#. Exercises/Interventions:   Therapeutic Ex (00840)  Min: 25' Resistance/Reps Cues/Notes   Pendulums    Towel Slides     Flex incline     Scaption    Scap Retraction 3x10 - flex/ext, side to side, CW/CCW    2x10  2x10    2x10x3 sec     AAROM with cane     ER     Flex   10x5 sec  x10 Supine   OH pulleys Flex 2 min    Single arm row/ row x10          Manual Intervention  (88924)  Min: 15'     L Elbow Flex/Ext PROM 3'    L Shoulder PROM In all directions to pt tolerance  12'         NMR re-education (91327)  Min:               Therapeutic Activity (21988)  Min:               Modalities:  10'     Ice Pack 10' L Shoulder          Other Therapeutic Activities:  Pt was educated on PT POC, Diagnosis, Prognosis, pathomechanics as well as frequency and duration of scheduling future physical therapy appointments. Time was also taken on this day to answer all patient questions and participation in PT. Reviewed appointment policy in detail with patient and patient verbalized understanding.      Home Exercise Program:  Pendulums  Towel Slides     Flex Scaption  Scap Retraction    Therapeutic Exercise and NMR EXR  [x] (71411) Provided verbal/tactile cueing for activities related to strengthening, flexibility, endurance, ROM  for improvements in scapular, scapulothoracic and UE control with self care, reaching, carrying, lifting, house/yardwork, driving/computer work.    [] (06006) Provided verbal/tactile cueing for activities related to improving balance, coordination, kinesthetic sense, posture, motor skill, proprioception  to assist with  scapular, scapulothoracic and UE control with self care, reaching, carrying, lifting, house/yardwork, driving/computer work. Therapeutic Activities:    [] (93194 or 04142) Provided verbal/tactile cueing for activities related to improving balance, coordination, kinesthetic sense, posture, motor skill, proprioception and motor activation to allow for proper function of scapular, scapulothoracic and UE control with self care, carrying, lifting, driving/computer work.      Home Exercise Program:    [x] (66078) Reviewed/Progressed HEP activities related to strengthening, flexibility, endurance, ROM of scapular, scapulothoracic and UE control with self care, reaching, carrying, lifting, house/yardwork, driving/computer work  [] (39584) Reviewed/Progressed HEP activities related to improving balance, coordination, kinesthetic sense, posture, motor skill, proprioception of scapular, scapulothoracic and UE control with self care, reaching, carrying, lifting, house/yardwork, driving/computer work      Manual Treatments:  PROM / STM / Oscillations-Mobs:  G-I, II, III, IV (PA's, Inf., Post.)  [x] (25320) Provided manual therapy to mobilize soft tissue/joints of cervical/CT, scapular GHJ and UE for the purpose of modulating pain, promoting relaxation,  increasing ROM, reducing/eliminating soft tissue swelling/inflammation/restriction, improving soft tissue extensibility and allowing for proper ROM for normal function with self care, reaching, carrying, lifting, house/yardwork, driving/computer work    Charges:  Timed Code Treatment Minutes: 40   Total Treatment Minutes: 50       [] EVAL (LOW) 94972 (typically 20 minutes face-to-face)  [] EVAL (MOD) 79886 (typically 30 minutes face-to-face)  [] EVAL (HIGH) 71788 (typically 45 minutes face-to-face)  [] RE-EVAL     [x] OV(84043) x  2   [] Dry needle 1 or 2 Muscles (47820)  [] NMR (20517) x     [] Dry needle 3+ Muscles (68863)  [x] Manual (64058) x 1     [] Ultrasound (41251) x  [] TA (96090) x     [] Mech Traction (04855)  [] ES(attended) (45180)     [] ES (un) (37076):   [] Vasopump (75880) [] Ionto (93690)   [] Other:    If [facility] Please Indicate Time In/Out  CPT Code Time in Time out                                   Approval Dates:  CPT Code Units Approved Units Used  Date Updated:                     GOALS:  Patient stated goal: \"To be able to get back to my normal life. \"  []? Progressing: []? Met: []? Not Met: []? Adjusted     Therapist goals for Patient:   Short Term Goals: To be achieved in: 4 weeks  1. Independent in HEP and progression per patient tolerance, in order to prevent re-injury. []? Progressing: []? Met: []? Not Met: []? Adjusted  2. Patient will have a decrease in pain to facilitate improvement in movement, function, and ADLs as indicated by Functional Deficits. []? Progressing: []? Met: []? Not Met: []? Adjusted     Long Term Goals: To be achieved in: 8 weeks  1. Disability index score of 70% or less for the Quick DASH to assist with reaching prior level of function. []? Progressing: []? Met: []? Not Met: []? Adjusted  2. Patient will demonstrate increased L shoulder flex/abd AROM to 140 deg to allow for proper joint functioning as indicated by Functional Deficits. []? Progressing: []? Met: []? Not Met: []? Adjusted  3.  Patient will demonstrate an increase in NM recruitment/activation and overall GH and scapular strength to 4/5 or WNL for proper functional mobility as indicated by patients Functional Deficits. []? Progressing: []? Met: []? Not Met: []? Adjusted  4. Patient will return to household chores without increased symptoms or restriction. []? Progressing: []? Met: []? Not Met: []? Adjusted  5. Patient will return to walking her dog 1 mile without increased pain or restriction. []? Progressing: []? Met: []? Not Met: []? Adjusted    ASSESSMENT:  Pt tolerated rx well. Pt displays improvements in PROM, however limitations still evident. Cont to progress with PROM. Scar area is less sensitive. No adverse effects to today's treatment. Add gentle strengthening exercises as tolerated. Treatment/Activity Tolerance:  [x] Patient tolerated treatment well [] Patient limited by fatique  [] Patient limited by pain  [] Patient limited by other medical complications  [] Other:     Overall Progression Towards Functional goals/ Treatment Progress Update:  [] Patient is progressing as expected towards functional goals listed. [] Progression is slowed due to complexities/Impairments listed. [] Progression has been slowed due to co-morbidities. [x] Plan just implemented, too soon to assess goals progression <30days   [] Goals require adjustment due to lack of progress  [] Patient is not progressing as expected and requires additional follow up with physician  [] Other    Prognosis for POC: [x] Good [] Fair  [] Poor    Patient requires continued skilled intervention: [x] Yes  [] No      PLAN: See eval  [x] Continue per plan of care [] Alter current plan (see comments)  [] Plan of care initiated [] Hold pending MD visit [] Discharge    Electronically signed by: Emily Chu PT     Note: If patient does not return for scheduled/recommended follow up visits, this note will serve as a discharge from care along with the most recent update on progress.

## 2022-01-26 ENCOUNTER — HOSPITAL ENCOUNTER (OUTPATIENT)
Dept: PHYSICAL THERAPY | Age: 62
Setting detail: THERAPIES SERIES
Discharge: HOME | End: 2022-01-26
Payer: COMMERCIAL

## 2022-01-26 PROCEDURE — 97140 MANUAL THERAPY 1/> REGIONS: CPT

## 2022-01-26 PROCEDURE — 97110 THERAPEUTIC EXERCISES: CPT

## 2022-01-26 NOTE — FLOWSHEET NOTE
[address]  Phone: [phone]   Fax:     [phone]        Physical Therapy Treatment Note/ Progress Report:       Date:  2022    Patient Name:  Felix Meigs    :  1960  MRN: [id number]    Pertinent Medical History:Additional Pertinent Hx: No PMH on file. Medical/Treatment Diagnosis Information:  · Diagnosis: S42.352D (ICD-10-CM) - Closed displaced comminuted fracture of shaft of left humerus with routine healing, subsequent encounter  · Treatment Dx: Decreased mobility and strength with pain throughout L UE. Insurance/Certification information:  PT Insurance Information: Medical [employer]  Physician Information:  Referring Practitioner: Carmen Rae MD  Plan of care signed (Y/N): Signed on 21    Date of Patient follow up with Physician:      Progress Report: []  Yes  [x]  No     Date Range for reporting period:  Beginnin2021  Ending:      Progress report due (10 Rx/or 30 days whichever is less):     Recertification due (POC duration/ or 90 days whichever is less):     Visit # POC/Insurance Allowable Auth Needed   6 bomn []Yes    [x]No     Functional Outcomes Measure:   Date Assessed: at eval (21)  Test: Q-DASH  Score: 47, disability score of 81.82%    Pain level:  Not assessed today/10     History of Injury: Patient reports L UE pain secondary to a left proximal humerus fracture/dislocation.  She underwent open reduction internal fixation on 2021. Fracture occurred when she was walking her 10 month old puppy, her puppy got excited, she could not maintain her balance and fell and hit her left shoulder. She states the past week she has noticed more movement. In the morning she reports more stiffness, however the pendulum exercises have been very helpful.  She states she is out of her sling when at home and sitting/walking from room to room. Pt states she has been sleeping in a recliner and has a difficulty time sleeping/has not been able to sleep in her bed. Pt is R handed. Pt likes to latoya, walk, and play with her dog. SUBJECTIVE:    12/30/21: Pt reports she was a little sore after her first PT visit yesterday, however it's not as sore as she was expecting. 01/04/22: Patient reports shoulder is feeling not too bad. States she was able to sleep for 4 hours last night. 01/20/22: Patient reports MD was pleased with progress,shoulder has been feeling a little looser overall. States she is able to sleep 6 hours in her bed at night.  1/24/22: Pt reports her shoulder is getting better each day. 1/26/22: Pt reports she saw her doctor. She states everything is going well and she is happy she has more movement in her arm. OBJECTIVE:    Observation:    Test measurements:      RESTRICTIONS/PRECAUTIONS: L proximal humeral fracture/dislocation s/p ORIF on 11/23/21. Lifting restrictions no greater than 2#. Exercises/Interventions:   Therapeutic Ex (02601)  Min: 30' Resistance/Reps Cues/Notes   Pendulums    Towel Slides     Flex incline     Scaption    Scap Retraction 3x10 - flex/ext, side to side, CW/CCW    2x10  2x10    2x10x3 sec     AAROM with cane     ER     Flex   10x5 sec  x10 Supine   OH pulleys Flex 2 min    Single arm row/ row x10     Sidelying ER 2x10    T-Slide     Rows Yellow  2x10     Manual Intervention  (71211)  Min: 15'     L Elbow Flex/Ext PROM 3'    L Shoulder PROM In all directions to pt tolerance  12'         NMR re-education (31881)  Min:               Therapeutic Activity (31759)  Min:               Modalities:  10'     Ice Pack 10' L Shoulder          Other Therapeutic Activities:  Pt was educated on PT POC, Diagnosis, Prognosis, pathomechanics as well as frequency and duration of scheduling future physical therapy appointments.  Time was also taken on this day to answer all patient questions and participation in PT. Reviewed appointment policy in detail with patient and patient verbalized understanding. Home Exercise Program:  Pendulums  Towel Slides     Flex     Scaption  Scap Retraction    Therapeutic Exercise and NMR EXR  [x] (53828) Provided verbal/tactile cueing for activities related to strengthening, flexibility, endurance, ROM  for improvements in scapular, scapulothoracic and UE control with self care, reaching, carrying, lifting, house/yardwork, driving/computer work.    [] (29532) Provided verbal/tactile cueing for activities related to improving balance, coordination, kinesthetic sense, posture, motor skill, proprioception  to assist with  scapular, scapulothoracic and UE control with self care, reaching, carrying, lifting, house/yardwork, driving/computer work. Therapeutic Activities:    [] (39127 or 47528) Provided verbal/tactile cueing for activities related to improving balance, coordination, kinesthetic sense, posture, motor skill, proprioception and motor activation to allow for proper function of scapular, scapulothoracic and UE control with self care, carrying, lifting, driving/computer work.      Home Exercise Program:    [x] (85460) Reviewed/Progressed HEP activities related to strengthening, flexibility, endurance, ROM of scapular, scapulothoracic and UE control with self care, reaching, carrying, lifting, house/yardwork, driving/computer work  [] (47175) Reviewed/Progressed HEP activities related to improving balance, coordination, kinesthetic sense, posture, motor skill, proprioception of scapular, scapulothoracic and UE control with self care, reaching, carrying, lifting, house/yardwork, driving/computer work      Manual Treatments:  PROM / STM / Oscillations-Mobs:  G-I, II, III, IV (PA's, Inf., Post.)  [x] (98003) Provided manual therapy to mobilize soft tissue/joints of cervical/CT, scapular GHJ and UE for the purpose of modulating pain, promoting relaxation,  increasing ROM, reducing/eliminating soft tissue swelling/inflammation/restriction, improving soft tissue extensibility and allowing for proper ROM for normal function with self care, reaching, carrying, lifting, house/yardwork, driving/computer work    Charges:  Timed Code Treatment Minutes: 45   Total Treatment Minutes: 55       [] EVAL (LOW) 29489 (typically 20 minutes face-to-face)  [] EVAL (MOD) 20529 (typically 30 minutes face-to-face)  [] EVAL (HIGH) 69520 (typically 45 minutes face-to-face)  [] RE-EVAL     [x] WN(57744) x  2   [] Dry needle 1 or 2 Muscles (52121)  [] NMR (84532) x     [] Dry needle 3+ Muscles (01752)  [x] Manual (59869) x 1     [] Ultrasound (80457) x  [] TA (13566) x     [] Mech Traction (99951)  [] ES(attended) (19685)     [] ES (un) (48289):   [] Vasopump (19728) [] Ionto (42731)   [] Other:    If [facility] Please Indicate Time In/Out  CPT Code Time in Time out                                   Approval Dates:  CPT Code Units Approved Units Used  Date Updated:                     GOALS:  Patient stated goal: \"To be able to get back to my normal life. \"  []? Progressing: []? Met: []? Not Met: []? Adjusted     Therapist goals for Patient:   Short Term Goals: To be achieved in: 4 weeks  1. Independent in HEP and progression per patient tolerance, in order to prevent re-injury. []? Progressing: []? Met: []? Not Met: []? Adjusted  2. Patient will have a decrease in pain to facilitate improvement in movement, function, and ADLs as indicated by Functional Deficits. []? Progressing: []? Met: []? Not Met: []? Adjusted     Long Term Goals: To be achieved in: 8 weeks  1. Disability index score of 70% or less for the Quick DASH to assist with reaching prior level of function. []? Progressing: []? Met: []? Not Met: []? Adjusted  2. Patient will demonstrate increased L shoulder flex/abd AROM to 140 deg to allow for proper joint functioning as indicated by Functional Deficits.    []? Progressing: []? Met: []? Not Met: []? Adjusted  3. Patient will demonstrate an increase in NM recruitment/activation and overall GH and scapular strength to 4/5 or WNL for proper functional mobility as indicated by patients Functional Deficits. []? Progressing: []? Met: []? Not Met: []? Adjusted  4. Patient will return to household chores without increased symptoms or restriction. []? Progressing: []? Met: []? Not Met: []? Adjusted  5. Patient will return to walking her dog 1 mile without increased pain or restriction. []? Progressing: []? Met: []? Not Met: []? Adjusted    ASSESSMENT:  Pt demonstrates good tolerance to the introduction of gentle strengthening exercises. Decreased strength evident in L RTC/scapular musculature. Pt displays improvements in PROM, however limitations still evident. Cont to progress with PROM and strengthening exercises as tolerated. No adverse effects to today's treatment. Treatment/Activity Tolerance:  [x] Patient tolerated treatment well [] Patient limited by fatique  [] Patient limited by pain  [] Patient limited by other medical complications  [] Other:     Overall Progression Towards Functional goals/ Treatment Progress Update:  [] Patient is progressing as expected towards functional goals listed. [] Progression is slowed due to complexities/Impairments listed. [] Progression has been slowed due to co-morbidities.   [x] Plan just implemented, too soon to assess goals progression <30days   [] Goals require adjustment due to lack of progress  [] Patient is not progressing as expected and requires additional follow up with physician  [] Other    Prognosis for POC: [x] Good [] Fair  [] Poor    Patient requires continued skilled intervention: [x] Yes  [] No      PLAN: See eval  [x] Continue per plan of care [] Alter current plan (see comments)  [] Plan of care initiated [] Hold pending MD visit [] Discharge    Electronically signed by: Saima Andrew, PT Note: If patient does not return for scheduled/recommended follow up visits, this note will serve as a discharge from care along with the most recent update on progress.

## 2022-01-28 ENCOUNTER — HOSPITAL ENCOUNTER (OUTPATIENT)
Dept: PHYSICAL THERAPY | Age: 62
Setting detail: THERAPIES SERIES
Discharge: HOME | End: 2022-01-28
Payer: COMMERCIAL

## 2022-01-28 NOTE — FLOWSHEET NOTE
[address] [facility] [address]  Phone: [phone]   Fax:     [phone]    Physical Therapy  Cancellation/No-show Note  Patient Name:  Shira Thomas  :  1960   Date:  2022  Cancelled visits to date: 2  No-shows to date: 0    Patient status for today's appointment patient:  [x]  Cancelled  []  Rescheduled appointment  []  No-show     Reason given by patient:  []  Patient ill  []  Conflicting appointment  []  No transportation    []  Conflict with work  []  No reason given  [x]  Other:     Comments:  Pt cancelled due to weather/snow as she lives 30 min away. Phone call information:   []  Phone call made today to patient at _ time at number provided:      []  Patient answered, conversation as follows:    []  Patient did not answer, message left as follows:  [x]  Phone call not made today. - Pt called the clinic to cancel her appt.      Electronically signed by:  Camron Arriaga PT

## 2022-01-31 ENCOUNTER — HOSPITAL ENCOUNTER (OUTPATIENT)
Dept: PHYSICAL THERAPY | Age: 62
Setting detail: THERAPIES SERIES
Discharge: HOME | End: 2022-01-31
Payer: COMMERCIAL

## 2022-01-31 PROCEDURE — 97110 THERAPEUTIC EXERCISES: CPT

## 2022-01-31 PROCEDURE — 97140 MANUAL THERAPY 1/> REGIONS: CPT

## 2022-01-31 NOTE — FLOWSHEET NOTE
[address] [facility] [address]  Phone: [phone]   Fax:     [phone]        Physical Therapy Treatment Note/ Progress Report:       Date:  2022    Patient Name:  Marisa Mena    :  1960  MRN: [id number]    Pertinent Medical History:Additional Pertinent Hx: No PMH on file. Medical/Treatment Diagnosis Information:  · Diagnosis: S42.352D (ICD-10-CM) - Closed displaced comminuted fracture of shaft of left humerus with routine healing, subsequent encounter  · Treatment Dx: Decreased mobility and strength with pain throughout L UE. Insurance/Certification information:  PT Insurance Information: Medical [employer]  Physician Information:  Referring Practitioner: Liudmila Nair MD  Plan of care signed (Y/N): Signed on 21    Date of Patient follow up with Physician:      Progress Report: []  Yes  [x]  No - PT will complete Progress note on 22     Date Range for reporting period:  Beginnin2021  Ending:      Progress report due (10 Rx/or 30 days whichever is less): 9/15/12    Recertification due (POC duration/ or 90 days whichever is less):     Visit # POC/Insurance Allowable Auth Needed   7 bomn []Yes    [x]No     Functional Outcomes Measure:   Date Assessed: at eval (21)  Test: Q-DASH  Score: 47, disability score of 81.82%    Pain level:  Not assessed today/10     History of Injury: Patient reports L UE pain secondary to a left proximal humerus fracture/dislocation.  She underwent open reduction internal fixation on 2021. Fracture occurred when she was walking her 10 month old puppy, her puppy got excited, she could not maintain her balance and fell and hit her left shoulder. She states the past week she has noticed more movement. In the morning she reports more stiffness, however the pendulum exercises have been very helpful.  She states she is Modalities:  10'     Ice Pack 10' L Shoulder          Other Therapeutic Activities:  Pt was educated on PT POC, Diagnosis, Prognosis, pathomechanics as well as frequency and duration of scheduling future physical therapy appointments. Time was also taken on this day to answer all patient questions and participation in PT. Reviewed appointment policy in detail with patient and patient verbalized understanding. Home Exercise Program:  Pendulums  Towel Slides     Flex     Scaption  Scap Retraction    Therapeutic Exercise and NMR EXR  [x] (28134) Provided verbal/tactile cueing for activities related to strengthening, flexibility, endurance, ROM  for improvements in scapular, scapulothoracic and UE control with self care, reaching, carrying, lifting, house/yardwork, driving/computer work.    [] (27658) Provided verbal/tactile cueing for activities related to improving balance, coordination, kinesthetic sense, posture, motor skill, proprioception  to assist with  scapular, scapulothoracic and UE control with self care, reaching, carrying, lifting, house/yardwork, driving/computer work. Therapeutic Activities:    [] (23087 or 17245) Provided verbal/tactile cueing for activities related to improving balance, coordination, kinesthetic sense, posture, motor skill, proprioception and motor activation to allow for proper function of scapular, scapulothoracic and UE control with self care, carrying, lifting, driving/computer work.      Home Exercise Program:    [x] (84722) Reviewed/Progressed HEP activities related to strengthening, flexibility, endurance, ROM of scapular, scapulothoracic and UE control with self care, reaching, carrying, lifting, house/yardwork, driving/computer work  [] (93707) Reviewed/Progressed HEP activities related to improving balance, coordination, kinesthetic sense, posture, motor skill, proprioception of scapular, scapulothoracic and UE control with self care, reaching, carrying, lifting, house/yardwork, driving/computer work      Manual Treatments:  PROM / STM / Oscillations-Mobs:  G-I, II, III, IV (PA's, Inf., Post.)  [x] (92988) Provided manual therapy to mobilize soft tissue/joints of cervical/CT, scapular GHJ and UE for the purpose of modulating pain, promoting relaxation,  increasing ROM, reducing/eliminating soft tissue swelling/inflammation/restriction, improving soft tissue extensibility and allowing for proper ROM for normal function with self care, reaching, carrying, lifting, house/yardwork, driving/computer work    Charges:  Timed Code Treatment Minutes: 45   Total Treatment Minutes: 55       [] EVAL (LOW) 32660 (typically 20 minutes face-to-face)  [] EVAL (MOD) 43014 (typically 30 minutes face-to-face)  [] EVAL (HIGH) 84137 (typically 45 minutes face-to-face)  [] RE-EVAL     [x] FY(85941) x  2   [] Dry needle 1 or 2 Muscles (82791)  [] NMR (69921) x     [] Dry needle 3+ Muscles (52851)  [x] Manual (96385) x 1     [] Ultrasound (59948) x  [] TA (15465) x     [] Mech Traction (52012)  [] ES(attended) (43831)     [] ES (un) (85649):   [] Vasopump (69302) [] Ionto (76958)   [] Other:    If [facility] Please Indicate Time In/Out  CPT Code Time in Time out                                   Approval Dates:  CPT Code Units Approved Units Used  Date Updated:                     GOALS:  Patient stated goal: \"To be able to get back to my normal life. \"  []? Progressing: []? Met: []? Not Met: []? Adjusted     Therapist goals for Patient:   Short Term Goals: To be achieved in: 4 weeks  1. Independent in HEP and progression per patient tolerance, in order to prevent re-injury. []? Progressing: []? Met: []? Not Met: []? Adjusted  2. Patient will have a decrease in pain to facilitate improvement in movement, function, and ADLs as indicated by Functional Deficits. []? Progressing: []? Met: []? Not Met: []? Adjusted     Long Term Goals: To be achieved in: 8 weeks  1.  Disability index score of 70% or less for the Quick DASH to assist with reaching prior level of function. []? Progressing: []? Met: []? Not Met: []? Adjusted  2. Patient will demonstrate increased L shoulder flex/abd AROM to 140 deg to allow for proper joint functioning as indicated by Functional Deficits. []? Progressing: []? Met: []? Not Met: []? Adjusted  3. Patient will demonstrate an increase in NM recruitment/activation and overall GH and scapular strength to 4/5 or WNL for proper functional mobility as indicated by patients Functional Deficits. []? Progressing: []? Met: []? Not Met: []? Adjusted  4. Patient will return to household chores without increased symptoms or restriction. []? Progressing: []? Met: []? Not Met: []? Adjusted  5. Patient will return to walking her dog 1 mile without increased pain or restriction. []? Progressing: []? Met: []? Not Met: []? Adjusted    ASSESSMENT:  Pt demonstrates good tolerance to the introduction and progression of strengthening exercises. Decreased strength evident in L RTC/scapular musculature. Pt displays improvements in PROM, however limitations still evident with some discomfort at EOR flexion. Cont to progress with PROM and strengthening exercises as tolerated. No adverse effects to today's treatment. Treatment/Activity Tolerance:  [x] Patient tolerated treatment well [] Patient limited by fatique  [] Patient limited by pain  [] Patient limited by other medical complications   [] Other:     Overall Progression Towards Functional goals/ Treatment Progress Update:  [] Patient is progressing as expected towards functional goals listed. [] Progression is slowed due to complexities/Impairments listed. [] Progression has been slowed due to co-morbidities.   [x] Plan just implemented, too soon to assess goals progression <30days   [] Goals require adjustment due to lack of progress  [] Patient is not progressing as expected and requires additional follow up with physician  [] Other    Prognosis for POC: [x] Good [] Fair  [] Poor    Patient requires continued skilled intervention: [x] Yes  [] No      PLAN: See eval  [x] Continue per plan of care [] Alter current plan (see comments)  [] Plan of care initiated [] Hold pending MD visit [] Discharge    Electronically signed by: Victorino Stoll PT     Note: If patient does not return for scheduled/recommended follow up visits, this note will serve as a discharge from care along with the most recent update on progress.

## 2022-02-02 ENCOUNTER — HOSPITAL ENCOUNTER (OUTPATIENT)
Dept: PHYSICAL THERAPY | Age: 62
Setting detail: THERAPIES SERIES
Discharge: HOME | End: 2022-02-02
Payer: COMMERCIAL

## 2022-02-02 PROCEDURE — 97110 THERAPEUTIC EXERCISES: CPT

## 2022-02-02 PROCEDURE — 97140 MANUAL THERAPY 1/> REGIONS: CPT

## 2022-02-02 NOTE — FLOWSHEET NOTE
[address]  Phone: [phone]   Fax:     [phone]        Physical Therapy Treatment Note/ Progress Report:       Date:  2022    Patient Name:  Moncho Valerio    :  1960  MRN: [id number]    Pertinent Medical History:Additional Pertinent Hx: No PMH on file. Medical/Treatment Diagnosis Information:  · Diagnosis: S42.352D (ICD-10-CM) - Closed displaced comminuted fracture of shaft of left humerus with routine healing, subsequent encounter  · Treatment Dx: Decreased mobility and strength with pain throughout L UE. Insurance/Certification information:  PT Insurance Information: Medical [place]  Physician Information:  Referring Practitioner: Asa Norman MD  Plan of care signed (Y/N): Signed on 21    Date of Patient follow up with Physician:      Progress Report: []  Yes  [x]  No - PT will complete Progress note on 22     Date Range for reporting period:  Beginnin2021  Ending:      Progress report due (10 Rx/or 30 days whichever is less):     Recertification due (POC duration/ or 90 days whichever is less):     Visit # POC/Insurance Allowable Auth Needed   8 bomn []Yes    [x]No     Functional Outcomes Measure:   Date Assessed: at eval (21)  Test: Q-DASH  Score: 47, disability score of 81.82%    Pain level:  3/10     History of Injury: Patient reports L UE pain secondary to a left proximal humerus fracture/dislocation.  She underwent open reduction internal fixation on 2021. Fracture occurred when she was walking her 10 month old puppy, her puppy got excited, she could not maintain her balance and fell and hit her left shoulder. She states the past week she has noticed more movement. In the morning she reports more stiffness, however the pendulum exercises have been very helpful.  She states she is out of her sling when at home and sitting/walking from room to room. Pt states she has been sleeping in a recliner and has a difficulty time sleeping/has not been able to sleep in her bed. Pt is R handed. Pt likes to latoya, walk, and play with her dog. SUBJECTIVE:    12/30/21: Pt reports she was a little sore after her first PT visit yesterday, however it's not as sore as she was expecting. 01/04/22: Patient reports shoulder is feeling not too bad. States she was able to sleep for 4 hours last night. 01/20/22: Patient reports MD was pleased with progress,shoulder has been feeling a little looser overall. States she is able to sleep 6 hours in her bed at night.  1/24/22: Pt reports her shoulder is getting better each day. 1/26/22: Pt reports she saw her doctor. She states everything is going well and she is happy she has more movement in her arm.    1/31/22: Pt reports, \"I feel like I am getting more movement each day. \" She reports some discomfort after last PT visit, but some Aleve helped mitigate the discomfort. 02/02/22: Patient reports  shoulder got sore last couple days had to take pain pill. States today is not as bad. OBJECTIVE:    Observation:    Test measurements:      RESTRICTIONS/PRECAUTIONS: L proximal humeral fracture/dislocation s/p ORIF on 11/23/21. Lifting restrictions no greater than 2#.      Exercises/Interventions:   Therapeutic Ex (25931)  Min: 30' Resistance/Reps Cues/Notes   Pendulums    Towel Slides     Flex incline     Scaption    Scap Retraction 3x10 - flex/ext, side to side, CW/CCW    2x10  2x10    2x10x3 sec     AAROM with cane     ER     Flex   10x5 sec  x10 Supine   OH pulleys Flex 2 min    Single arm row/ row 2#, x10     Sidelying ER 2x10    T-Slide     Rows Yellow  2x10    Supine chest press  2#, x10    Bicep Curls 2#, x10 Neutral     Manual Intervention  (07833)  Min: 15'     L Elbow Flex/Ext PROM 3'    L Shoulder PROM In all directions to pt tolerance  12'         NMR re-education (25743)  Min:               Therapeutic Activity (22853)  Min:               Modalities:  10'     Ice Pack 10' L Shoulder          Other Therapeutic Activities:  Pt was educated on PT POC, Diagnosis, Prognosis, pathomechanics as well as frequency and duration of scheduling future physical therapy appointments. Time was also taken on this day to answer all patient questions and participation in PT. Reviewed appointment policy in detail with patient and patient verbalized understanding. Home Exercise Program:  Pendulums  Towel Slides     Flex     Scaption  Scap Retraction    Therapeutic Exercise and NMR EXR  [x] (91106) Provided verbal/tactile cueing for activities related to strengthening, flexibility, endurance, ROM  for improvements in scapular, scapulothoracic and UE control with self care, reaching, carrying, lifting, house/yardwork, driving/computer work.    [] (32734) Provided verbal/tactile cueing for activities related to improving balance, coordination, kinesthetic sense, posture, motor skill, proprioception  to assist with  scapular, scapulothoracic and UE control with self care, reaching, carrying, lifting, house/yardwork, driving/computer work. Therapeutic Activities:    [] (27223 or 50424) Provided verbal/tactile cueing for activities related to improving balance, coordination, kinesthetic sense, posture, motor skill, proprioception and motor activation to allow for proper function of scapular, scapulothoracic and UE control with self care, carrying, lifting, driving/computer work.      Home Exercise Program:    [x] (20638) Reviewed/Progressed HEP activities related to strengthening, flexibility, endurance, ROM of scapular, scapulothoracic and UE control with self care, reaching, carrying, lifting, house/yardwork, driving/computer work  [] (84872) Reviewed/Progressed HEP activities related to improving balance, coordination, kinesthetic sense, posture, motor skill, proprioception of scapular, scapulothoracic and UE control with self care, reaching, carrying, lifting, house/yardwork, driving/computer work      Manual Treatments:  PROM / STM / Oscillations-Mobs:  G-I, II, III, IV (PA's, Inf., Post.)  [x] (86368) Provided manual therapy to mobilize soft tissue/joints of cervical/CT, scapular GHJ and UE for the purpose of modulating pain, promoting relaxation,  increasing ROM, reducing/eliminating soft tissue swelling/inflammation/restriction, improving soft tissue extensibility and allowing for proper ROM for normal function with self care, reaching, carrying, lifting, house/yardwork, driving/computer work    Charges:  Timed Code Treatment Minutes: 45   Total Treatment Minutes: 55       [] EVAL (LOW) 26927 (typically 20 minutes face-to-face)  [] EVAL (MOD) 73903 (typically 30 minutes face-to-face)  [] EVAL (HIGH) 26578 (typically 45 minutes face-to-face)  [] RE-EVAL     [x] FZ(65595) x  2   [] Dry needle 1 or 2 Muscles (12361)  [] NMR (26727) x     [] Dry needle 3+ Muscles (44916)  [x] Manual (20815) x 1     [] Ultrasound (00786) x  [] TA (87647) x     [] Mech Traction (27862)  [] ES(attended) (48295)     [] ES (un) (49999):   [] Vasopump (04219) [] Ionto (52779)   [] Other:    If [facility] Please Indicate Time In/Out  CPT Code Time in Time out                                   Approval Dates:  CPT Code Units Approved Units Used  Date Updated:                     GOALS:  Patient stated goal: \"To be able to get back to my normal life. \"  []? Progressing: []? Met: []? Not Met: []? Adjusted     Therapist goals for Patient:   Short Term Goals: To be achieved in: 4 weeks  1. Independent in HEP and progression per patient tolerance, in order to prevent re-injury. []? Progressing: []? Met: []? Not Met: []? Adjusted  2. Patient will have a decrease in pain to facilitate improvement in movement, function, and ADLs as indicated by Functional Deficits. []? Progressing: []? Met: []? Not Met: []?  Adjusted     Long Term Goals: To be achieved in: 8 weeks  1. Disability index score of 70% or less for the Quick DASH to assist with reaching prior level of function. []? Progressing: []? Met: []? Not Met: []? Adjusted  2. Patient will demonstrate increased L shoulder flex/abd AROM to 140 deg to allow for proper joint functioning as indicated by Functional Deficits. []? Progressing: []? Met: []? Not Met: []? Adjusted  3. Patient will demonstrate an increase in NM recruitment/activation and overall GH and scapular strength to 4/5 or WNL for proper functional mobility as indicated by patients Functional Deficits. []? Progressing: []? Met: []? Not Met: []? Adjusted  4. Patient will return to household chores without increased symptoms or restriction. []? Progressing: []? Met: []? Not Met: []? Adjusted  5. Patient will return to walking her dog 1 mile without increased pain or restriction. []? Progressing: []? Met: []? Not Met: []? Adjusted    ASSESSMENT:  Pt tolerated treatment well. Continue to progress with PROM and scapular strengthening exercises,no adverse effect to today's treatment. Treatment/Activity Tolerance:  [x] Patient tolerated treatment well [] Patient limited by fatique  [] Patient limited by pain  [] Patient limited by other medical complications   [] Other:     Overall Progression Towards Functional goals/ Treatment Progress Update:  [] Patient is progressing as expected towards functional goals listed. [] Progression is slowed due to complexities/Impairments listed. [] Progression has been slowed due to co-morbidities.   [x] Plan just implemented, too soon to assess goals progression <30days   [] Goals require adjustment due to lack of progress  [] Patient is not progressing as expected and requires additional follow up with physician  [] Other    Prognosis for POC: [x] Good [] Fair  [] Poor    Patient requires continued skilled intervention: [x] Yes  [] No      PLAN: See eval  [x] Continue per plan of care [] Alter current plan (see comments)  [] Plan of care initiated [] Hold pending MD visit [] Discharge    Electronically signed by: Namita Abdul, PTA [id number]    Note: If patient does not return for scheduled/recommended follow up visits, this note will serve as a discharge from care along with the most recent update on progress.

## 2022-02-04 ENCOUNTER — HOSPITAL ENCOUNTER (OUTPATIENT)
Dept: PHYSICAL THERAPY | Age: 62
Setting detail: THERAPIES SERIES
End: 2022-02-04
Payer: COMMERCIAL

## 2022-02-09 ENCOUNTER — HOSPITAL ENCOUNTER (OUTPATIENT)
Dept: PHYSICAL THERAPY | Age: 62
Setting detail: THERAPIES SERIES
Discharge: HOME | End: 2022-02-09
Payer: COMMERCIAL

## 2022-02-09 PROCEDURE — 97140 MANUAL THERAPY 1/> REGIONS: CPT

## 2022-02-09 PROCEDURE — 97110 THERAPEUTIC EXERCISES: CPT

## 2022-02-09 NOTE — FLOWSHEET NOTE
[address] [facility] [address]  Phone: [phone]   Fax:     [phone]        Physical Therapy Treatment Note/ Progress Report:       Date:  2022    Patient Name:  Rose Douglass    :  1960  MRN: [id number]    Pertinent Medical History:Additional Pertinent Hx: No PMH on file. Medical/Treatment Diagnosis Information:  · Diagnosis: S42.352D (ICD-10-CM) - Closed displaced comminuted fracture of shaft of left humerus with routine healing, subsequent encounter  · Treatment Dx: Decreased mobility and strength with pain throughout L UE. Insurance/Certification information:  PT Insurance Information: Medical [place]  Physician Information:  Referring Practitioner: Rhett Guillory MD  Plan of care signed (Y/N): Signed on 21    Date of Patient follow up with Physician:      Progress Report: []  Yes  [x]  No - PT will complete Progress note on 22     Date Range for reporting period:  Beginnin2021  Ending:      Progress report due (10 Rx/or 30 days whichever is less):     Recertification due (POC duration/ or 90 days whichever is less):     Visit # POC/Insurance Allowable Auth Needed   9 bomn []Yes    [x]No     Functional Outcomes Measure:   Date Assessed: at eval (21)  Test: Q-DASH  Score: 47, disability score of 81.82%    Pain level:  3/10     History of Injury: Patient reports L UE pain secondary to a left proximal humerus fracture/dislocation.  She underwent open reduction internal fixation on 2021. Fracture occurred when she was walking her 10 month old puppy, her puppy got excited, she could not maintain her balance and fell and hit her left shoulder. She states the past week she has noticed more movement. In the morning she reports more stiffness, however the pendulum exercises have been very helpful.  She states she is out of her sling when at home and sitting/walking from room to room. Pt states she has been sleeping in a recliner and has a difficulty time sleeping/has not been able to sleep in her bed. Pt is R handed. Pt likes to latoya, walk, and play with her dog. SUBJECTIVE:    12/30/21: Pt reports she was a little sore after her first PT visit yesterday, however it's not as sore as she was expecting. 01/04/22: Patient reports shoulder is feeling not too bad. States she was able to sleep for 4 hours last night. 01/20/22: Patient reports MD was pleased with progress,shoulder has been feeling a little looser overall. States she is able to sleep 6 hours in her bed at night.  1/24/22: Pt reports her shoulder is getting better each day. 1/26/22: Pt reports she saw her doctor. She states everything is going well and she is happy she has more movement in her arm.    1/31/22: Pt reports, \"I feel like I am getting more movement each day. \" She reports some discomfort after last PT visit, but some Aleve helped mitigate the discomfort. 02/02/22: Patient reports  shoulder got sore last couple days had to take pain pill. States today is not as bad.  2/9/22: Pt reports her shoulder is doing okay. She states her doctor put her on some naproxen for swelling management. OBJECTIVE:    Observation:    Test measurements:      RESTRICTIONS/PRECAUTIONS: L proximal humeral fracture/dislocation s/p ORIF on 11/23/21. Lifting restrictions no greater than 2#.      Exercises/Interventions:   Therapeutic Ex (18061)  Min: 25' Resistance/Reps Cues/Notes   Pendulums    Towel Slides     Flex incline     Scaption    Scap Retraction 3x10 - flex/ext, side to side, CW/CCW    2x10  2x10    2x10x3 sec     AAROM with cane     ER     Flex   10x5 sec  x10 Supine   OH pulleys Flex 2 min    Single arm row/ row x10     Sidelying ER 2x10    T-Slide     Rows Yellow  2x10    Supine chest press  HOLD   Bicep Curls HOLD    Manual Intervention  (88852)  Min: 20'     L Elbow Flex/Ext PROM 2'    L Shoulder PROM In all directions to pt tolerance  12'    STM/scar tissue massage  L incision scar tissue massage    NMR re-education (21417)  Min:               Therapeutic Activity (78231)  Min:               Modalities:  10'     Ice Pack 10' L Shoulder          Other Therapeutic Activities:  Pt was educated on PT POC, Diagnosis, Prognosis, pathomechanics as well as frequency and duration of scheduling future physical therapy appointments. Time was also taken on this day to answer all patient questions and participation in PT. Reviewed appointment policy in detail with patient and patient verbalized understanding. Home Exercise Program:  Pendulums  Towel Slides     Flex     Scaption  Scap Retraction    Therapeutic Exercise and NMR EXR  [x] (48375) Provided verbal/tactile cueing for activities related to strengthening, flexibility, endurance, ROM  for improvements in scapular, scapulothoracic and UE control with self care, reaching, carrying, lifting, house/yardwork, driving/computer work.    [] (06992) Provided verbal/tactile cueing for activities related to improving balance, coordination, kinesthetic sense, posture, motor skill, proprioception  to assist with  scapular, scapulothoracic and UE control with self care, reaching, carrying, lifting, house/yardwork, driving/computer work. Therapeutic Activities:    [] (16098 or 41479) Provided verbal/tactile cueing for activities related to improving balance, coordination, kinesthetic sense, posture, motor skill, proprioception and motor activation to allow for proper function of scapular, scapulothoracic and UE control with self care, carrying, lifting, driving/computer work.      Home Exercise Program:    [x] (72071) Reviewed/Progressed HEP activities related to strengthening, flexibility, endurance, ROM of scapular, scapulothoracic and UE control with self care, reaching, carrying, lifting, house/yardwork, driving/computer work  [] (66049) Reviewed/Progressed HEP activities related to improving balance, coordination, kinesthetic sense, posture, motor skill, proprioception of scapular, scapulothoracic and UE control with self care, reaching, carrying, lifting, house/yardwork, driving/computer work      Manual Treatments:  PROM / STM / Oscillations-Mobs:  G-I, II, III, IV (PA's, Inf., Post.)  [x] (51682) Provided manual therapy to mobilize soft tissue/joints of cervical/CT, scapular GHJ and UE for the purpose of modulating pain, promoting relaxation,  increasing ROM, reducing/eliminating soft tissue swelling/inflammation/restriction, improving soft tissue extensibility and allowing for proper ROM for normal function with self care, reaching, carrying, lifting, house/yardwork, driving/computer work    Charges:  Timed Code Treatment Minutes: 45   Total Treatment Minutes: 55       [] EVAL (LOW) 24323 (typically 20 minutes face-to-face)  [] EVAL (MOD) 93636 (typically 30 minutes face-to-face)  [] EVAL (HIGH) 70257 (typically 45 minutes face-to-face)  [] RE-EVAL     [x] SB(76395) x  2   [] Dry needle 1 or 2 Muscles (36885)  [] NMR (39259) x     [] Dry needle 3+ Muscles (75597)  [x] Manual (53374) x 1     [] Ultrasound (44007) x  [] TA (25574) x     [] Mech Traction (80109)  [] ES(attended) (43984)     [] ES (un) (67768):   [] Vasopump (45169) [] Ionto (79488)   [] Other:    If [facility] Please Indicate Time In/Out  CPT Code Time in Time out                                   Approval Dates:  CPT Code Units Approved Units Used  Date Updated:                     GOALS:  Patient stated goal: \"To be able to get back to my normal life. \"  []? Progressing: []? Met: []? Not Met: []? Adjusted     Therapist goals for Patient:   Short Term Goals: To be achieved in: 4 weeks  1. Independent in HEP and progression per patient tolerance, in order to prevent re-injury. []? Progressing: []? Met: []? Not Met: []? Adjusted  2.  Patient will have a decrease in pain to facilitate improvement in movement, function, and ADLs as indicated by Functional Deficits. []? Progressing: []? Met: []? Not Met: []? Adjusted     Long Term Goals: To be achieved in: 8 weeks  1. Disability index score of 70% or less for the Quick DASH to assist with reaching prior level of function. []? Progressing: []? Met: []? Not Met: []? Adjusted  2. Patient will demonstrate increased L shoulder flex/abd AROM to 140 deg to allow for proper joint functioning as indicated by Functional Deficits. []? Progressing: []? Met: []? Not Met: []? Adjusted  3. Patient will demonstrate an increase in NM recruitment/activation and overall GH and scapular strength to 4/5 or WNL for proper functional mobility as indicated by patients Functional Deficits. []? Progressing: []? Met: []? Not Met: []? Adjusted  4. Patient will return to household chores without increased symptoms or restriction. []? Progressing: []? Met: []? Not Met: []? Adjusted  5. Patient will return to walking her dog 1 mile without increased pain or restriction. []? Progressing: []? Met: []? Not Met: []? Adjusted    ASSESSMENT:  Pt tolerated treatment well. PT limited the amount/number of strengthening exercises. Added scar tissue massage. Pt displays scar tissue build up in the middle of her incision. Continue to progress with PROM and scapular strengthening exercises, no adverse effect to today's treatment. Treatment/Activity Tolerance:  [x] Patient tolerated treatment well [] Patient limited by fatique  [] Patient limited by pain  [] Patient limited by other medical complications   [] Other:     Overall Progression Towards Functional goals/ Treatment Progress Update:  [] Patient is progressing as expected towards functional goals listed. [] Progression is slowed due to complexities/Impairments listed. [] Progression has been slowed due to co-morbidities.   [x] Plan just implemented, too soon to assess goals progression <30days [] Goals require adjustment due to lack of progress  [] Patient is not progressing as expected and requires additional follow up with physician  [] Other    Prognosis for POC: [x] Good [] Fair  [] Poor    Patient requires continued skilled intervention: [x] Yes  [] No      PLAN: See eval  [x] Continue per plan of care [] Alter current plan (see comments)  [] Plan of care initiated [] Hold pending MD visit [] Discharge    Electronically signed by: [place] [id number]    Note: If patient does not return for scheduled/recommended follow up visits, this note will serve as a discharge from care along with the most recent update on progress.

## 2022-02-11 ENCOUNTER — HOSPITAL ENCOUNTER (OUTPATIENT)
Dept: PHYSICAL THERAPY | Age: 62
Setting detail: THERAPIES SERIES
Discharge: HOME | End: 2022-02-11
Payer: COMMERCIAL

## 2022-02-11 PROCEDURE — 97110 THERAPEUTIC EXERCISES: CPT

## 2022-02-11 PROCEDURE — 97140 MANUAL THERAPY 1/> REGIONS: CPT

## 2022-02-11 NOTE — PROGRESS NOTES
[address] [facility] [address]  Phone: [phone]   Fax:     [phone]        Physical Therapy Treatment Note/ Progress Report:       Date:  2022    Patient Name:  Maurice Mccurdy    :  1960  MRN: [id number]    Pertinent Medical History:Additional Pertinent Hx: No PMH on file. Medical/Treatment Diagnosis Information:  · Diagnosis: S42.352D (ICD-10-CM) - Closed displaced comminuted fracture of shaft of left humerus with routine healing, subsequent encounter  · Treatment Dx: Decreased mobility and strength with pain throughout L UE. Insurance/Certification information:  PT Insurance Information: Medical [place]  Physician Information:  Referring Practitioner: Christianne Sanchez MD  Plan of care signed (Y/N): Signed on 21    Date of Patient follow up with Physician: 3/3/22     Progress Report: [x]  Yes  []  No       Date Range for reporting period:  Beginnin/11/22  Ending:      Progress report due (10 Rx/or 30 days whichever is less):   14    Recertification due (POC duration/ or 90 days whichever is less):     Visit # POC/Insurance Allowable Auth Needed   10 bomn []Yes    [x]No     Functional Outcomes Measure:   Date Assessed: at progress note (22)  Test: Q-DASH  Score: 36, disability score of 56%    Pain level:  3/10     History of Injury: Patient reports L UE pain secondary to a left proximal humerus fracture/dislocation.  She underwent open reduction internal fixation on 2021. Fracture occurred when she was walking her 10 month old puppy, her puppy got excited, she could not maintain her balance and fell and hit her left shoulder. She states the past week she has noticed more movement. In the morning she reports more stiffness, however the pendulum exercises have been very helpful.  She states she is out of her sling when at home and sitting/walking from room to room. Pt states she has been sleeping in a recliner and has a difficulty time sleeping/has not been able to sleep in her bed. Pt is R handed. Pt likes to latoya, walk, and play with her dog. SUBJECTIVE:    12/30/21: Pt reports she was a little sore after her first PT visit yesterday, however it's not as sore as she was expecting. 01/04/22: Patient reports shoulder is feeling not too bad. States she was able to sleep for 4 hours last night. 01/20/22: Patient reports MD was pleased with progress,shoulder has been feeling a little looser overall. States she is able to sleep 6 hours in her bed at night.  1/24/22: Pt reports her shoulder is getting better each day. 1/26/22: Pt reports she saw her doctor. She states everything is going well and she is happy she has more movement in her arm.    1/31/22: Pt reports, \"I feel like I am getting more movement each day. \" She reports some discomfort after last PT visit, but some Aleve helped mitigate the discomfort. 02/02/22: Patient reports  shoulder got sore last couple days had to take pain pill. States today is not as bad.  2/9/22: Pt reports her shoulder is doing okay. She states her doctor put her on some naproxen for swelling management. 2/11/22: Pt reports, \"My shoulder/arm has been feeling good the past couple days. \" She states she has been happy with her progress and that she has been able to do some more baking and household chores. OBJECTIVE:    Observation:    Test measurements:      ROM Left AROM Left PROM Right AROM   Shoulder Flex 90 deg, tight 175 deg WNL   Shoulder Abd 80 deg, pain 160 deg WNL   Shoulder ER @ 45 deg - 50 deg WNL   Shoulder IR @ 45 deg - 60 deg WNL                     Strength   Left Right   Shoulder Flex  3+/5 4/5   Shoulder Scap  3+/5 4/5   Shoulder ER  3+/5 4/5   Shoulder IR  3+/5 4/5                   RESTRICTIONS/PRECAUTIONS: L proximal humeral fracture/dislocation s/p ORIF on 11/23/21.  Lifting restrictions no greater than 2#. Exercises/Interventions:   Therapeutic Ex (78051)  Min: 25' Resistance/Reps Cues/Notes   Pendulums    Towel Slides     Flex incline     Scaption    Scap Retraction 3x10 - flex/ext, side to side, CW/CCW    2x10  2x10    2x10x3 sec     AAROM with cane     ER     Flex   10x5 sec  x10 Supine   OH pulleys Flex 2 min    Single arm row/ row x10     Sidelying ER 2x10    T-Slide     Rows Yellow  2x10    Supine chest press  HOLD   Bicep Curls HOLD    Manual Intervention  (91100)  Min: 20'     L Elbow Flex/Ext PROM 2'    L Shoulder PROM In all directions to pt tolerance  12'    STM/scar tissue massage  L incision scar tissue massage    NMR re-education (86197)  Min:               Therapeutic Activity (11252)  Min:               Modalities:  10'     Ice Pack 10' L Shoulder          Other Therapeutic Activities:  Pt was educated on PT POC, Diagnosis, Prognosis, pathomechanics as well as frequency and duration of scheduling future physical therapy appointments. Time was also taken on this day to answer all patient questions and participation in PT. Reviewed appointment policy in detail with patient and patient verbalized understanding. Home Exercise Program:  Pendulums  Towel Slides     Flex     Scaption  Scap Retraction    Therapeutic Exercise and NMR EXR  [x] (51244) Provided verbal/tactile cueing for activities related to strengthening, flexibility, endurance, ROM  for improvements in scapular, scapulothoracic and UE control with self care, reaching, carrying, lifting, house/yardwork, driving/computer work.    [] (74023) Provided verbal/tactile cueing for activities related to improving balance, coordination, kinesthetic sense, posture, motor skill, proprioception  to assist with  scapular, scapulothoracic and UE control with self care, reaching, carrying, lifting, house/yardwork, driving/computer work.     Therapeutic Activities:    [] (81376 or 71461) Provided verbal/tactile cueing for activities related to improving balance, coordination, kinesthetic sense, posture, motor skill, proprioception and motor activation to allow for proper function of scapular, scapulothoracic and UE control with self care, carrying, lifting, driving/computer work.      Home Exercise Program:    [x] (00739) Reviewed/Progressed HEP activities related to strengthening, flexibility, endurance, ROM of scapular, scapulothoracic and UE control with self care, reaching, carrying, lifting, house/yardwork, driving/computer work  [] (38660) Reviewed/Progressed HEP activities related to improving balance, coordination, kinesthetic sense, posture, motor skill, proprioception of scapular, scapulothoracic and UE control with self care, reaching, carrying, lifting, house/yardwork, driving/computer work      Manual Treatments:  PROM / STM / Oscillations-Mobs:  G-I, II, III, IV (PA's, Inf., Post.)  [x] (85931) Provided manual therapy to mobilize soft tissue/joints of cervical/CT, scapular GHJ and UE for the purpose of modulating pain, promoting relaxation,  increasing ROM, reducing/eliminating soft tissue swelling/inflammation/restriction, improving soft tissue extensibility and allowing for proper ROM for normal function with self care, reaching, carrying, lifting, house/yardwork, driving/computer work    Charges:  Timed Code Treatment Minutes: 45   Total Treatment Minutes: 55       [] EVAL (LOW) 10920 (typically 20 minutes face-to-face)  [] EVAL (MOD) 70708 (typically 30 minutes face-to-face)  [] EVAL (HIGH) 88646 (typically 45 minutes face-to-face)  [] RE-EVAL     [x] YE(98780) x  2   [] Dry needle 1 or 2 Muscles (80951)  [] NMR (60270) x     [] Dry needle 3+ Muscles (62293)  [x] Manual (59428) x 1     [] Ultrasound (79198) x  [] TA (98979) x     [] Mech Traction (91332)  [] ES(attended) (45538)     [] ES (un) (75323):   [] Vasopump (91059) [] Ionto (11205)   [] Other:      GOALS:  Patient stated goal: \"To be able to get back to my normal life. \"  [x]? Progressing: []? Met: []? Not Met: []? Adjusted     Therapist goals for Patient:   Short Term Goals: To be achieved in: 4 weeks  1. Independent in HEP and progression per patient tolerance, in order to prevent re-injury. [x]? Progressing: []? Met: []? Not Met: []? Adjusted  2. Patient will have a decrease in pain to facilitate improvement in movement, function, and ADLs as indicated by Functional Deficits. [x]? Progressing: []? Met: []? Not Met: []? Adjusted     Long Term Goals: To be achieved in: 8 weeks  1. Disability index score of 70% or less for the Quick DASH to assist with reaching prior level of function. []? Progressing: [x]? Met: []? Not Met: []? Adjusted  2. Patient will demonstrate increased L shoulder flex/abd AROM to 140 deg to allow for proper joint functioning as indicated by Functional Deficits. [x]? Progressing: []? Met: []? Not Met: []? Adjusted  3. Patient will demonstrate an increase in NM recruitment/activation and overall GH and scapular strength to 4/5 or WNL for proper functional mobility as indicated by patients Functional Deficits. [x]? Progressing: []? Met: []? Not Met: []? Adjusted  4. Patient will return to household chores without increased symptoms or restriction. [x]? Progressing: []? Met: []? Not Met: []? Adjusted  5. Patient will return to walking her dog 1 mile without increased pain or restriction. []? Progressing: []? Met: [x]? Not Met: []? Adjusted    ASSESSMENT:  Upon RE, pt demonstrates significant improvements in ROM. Pt does continue to display limitations in AROM and strength deficits leading to limitations in functional mobility especially with OH based activities. Pt tolerated treatment well. PT limited the amount/number of strengthening exercises. Added scar tissue massage. Pt displays scar tissue build up in the middle of her incision.  Continue to progress with PROM and scapular strengthening exercises, no adverse effect to today's treatment. Treatment/Activity Tolerance:  [x] Patient tolerated treatment well [] Patient limited by fatique  [] Patient limited by pain  [] Patient limited by other medical complications   [] Other:     Overall Progression Towards Functional goals/ Treatment Progress Update:  [x] Patient is progressing as expected towards functional goals listed. [x] Progression is slowed due to complexities/Impairments listed. [] Progression has been slowed due to co-morbidities. [] Plan just implemented, too soon to assess goals progression <30days   [] Goals require adjustment due to lack of progress  [] Patient is not progressing as expected and requires additional follow up with physician  [] Other    Prognosis for POC: [x] Good [] Fair  [] Poor    Patient requires continued skilled intervention: [x] Yes  [] No      PLAN: See eval  [x] Continue per plan of care [] Alter current plan (see comments)  [] Plan of care initiated [] Hold pending MD visit [] Discharge    Electronically signed by: Marva Cartagena PT     Note: If patient does not return for scheduled/recommended follow up visits, this note will serve as a discharge from care along with the most recent update on progress.

## 2022-02-14 ENCOUNTER — HOSPITAL ENCOUNTER (OUTPATIENT)
Dept: PHYSICAL THERAPY | Age: 62
Setting detail: THERAPIES SERIES
Discharge: HOME | End: 2022-02-14
Payer: COMMERCIAL

## 2022-02-14 PROCEDURE — 97110 THERAPEUTIC EXERCISES: CPT

## 2022-02-14 PROCEDURE — 97140 MANUAL THERAPY 1/> REGIONS: CPT

## 2022-02-14 NOTE — FLOWSHEET NOTE
sitting/walking from room to room. Pt states she has been sleeping in a recliner and has a difficulty time sleeping/has not been able to sleep in her bed. Pt is R handed. Pt likes to latoya, walk, and play with her dog. SUBJECTIVE:    12/30/21: Pt reports she was a little sore after her first PT visit yesterday, however it's not as sore as she was expecting. 01/04/22: Patient reports shoulder is feeling not too bad. States she was able to sleep for 4 hours last night. 01/20/22: Patient reports MD was pleased with progress,shoulder has been feeling a little looser overall. States she is able to sleep 6 hours in her bed at night.  1/24/22: Pt reports her shoulder is getting better each day. 1/26/22: Pt reports she saw her doctor. She states everything is going well and she is happy she has more movement in her arm.    1/31/22: Pt reports, \"I feel like I am getting more movement each day. \" She reports some discomfort after last PT visit, but some Aleve helped mitigate the discomfort. 02/02/22: Patient reports  shoulder got sore last couple days had to take pain pill. States today is not as bad.  2/9/22: Pt reports her shoulder is doing okay. She states her doctor put her on some naproxen for swelling management. 2/11/22: Pt reports, \"My shoulder/arm has been feeling good the past couple days. \" She states she has been happy with her progress and that she has been able to do some more baking and household chores. 02/14/22: Patient reports shoulder is improving,just soreness in the biceps area. States she was able to run the sweeper with left ue.     OBJECTIVE:    Observation:    Test measurements:      ROM Left AROM Left PROM Right AROM   Shoulder Flex 90 deg, tight 175 deg WNL   Shoulder Abd 80 deg, pain 160 deg WNL   Shoulder ER @ 45 deg - 50 deg WNL   Shoulder IR @ 45 deg - 60 deg WNL                     Strength   Left Right   Shoulder Flex  3+/5 4/5   Shoulder Scap  3+/5 4/5   Shoulder ER  3+/5 4/5 Shoulder IR  3+/5 4/5                   RESTRICTIONS/PRECAUTIONS: L proximal humeral fracture/dislocation s/p ORIF on 11/23/21. Lifting restrictions no greater than 2#. Exercises/Interventions:   Therapeutic Ex (01384)  Min: 25' Resistance/Reps Cues/Notes   Pendulums    Towel Slides     Flex incline     Scaption    Scap Retraction 3x10 - flex/ext, side to side, CW/CCW    2x10  2x10    2x10x3 sec     AAROM with cane     ER     Flex   10x5 sec  x10 Supine   OH pulleys Flex 2 min    Single arm row/ row  2x10     Sidelying ER 2x10    T-Slide     Rows Yellow  2x10    Supine chest press  HOLD   Bicep Curls HOLD    Manual Intervention  (59626)  Min: 20'     L Elbow Flex/Ext PROM 2'    L Shoulder PROM In all directions to pt tolerance  12'    STM/scar tissue massage  L incision scar tissue massage    NMR re-education (21032)  Min:               Therapeutic Activity (66113)  Min:               Modalities:  10'     Ice Pack 10' L Shoulder          Other Therapeutic Activities:  Pt was educated on PT POC, Diagnosis, Prognosis, pathomechanics as well as frequency and duration of scheduling future physical therapy appointments. Time was also taken on this day to answer all patient questions and participation in PT. Reviewed appointment policy in detail with patient and patient verbalized understanding.      Home Exercise Program:  Pendulums  Towel Slides     Flex     Scaption  Scap Retraction    Therapeutic Exercise and NMR EXR  [x] (53353) Provided verbal/tactile cueing for activities related to strengthening, flexibility, endurance, ROM  for improvements in scapular, scapulothoracic and UE control with self care, reaching, carrying, lifting, house/yardwork, driving/computer work.    [] (71315) Provided verbal/tactile cueing for activities related to improving balance, coordination, kinesthetic sense, posture, motor skill, proprioception  to assist with  scapular, scapulothoracic and UE control with self care, reaching, carrying, lifting, house/yardwork, driving/computer work. Therapeutic Activities:    [] (43471 or 42453) Provided verbal/tactile cueing for activities related to improving balance, coordination, kinesthetic sense, posture, motor skill, proprioception and motor activation to allow for proper function of scapular, scapulothoracic and UE control with self care, carrying, lifting, driving/computer work.      Home Exercise Program:    [x] (14617) Reviewed/Progressed HEP activities related to strengthening, flexibility, endurance, ROM of scapular, scapulothoracic and UE control with self care, reaching, carrying, lifting, house/yardwork, driving/computer work  [] (56936) Reviewed/Progressed HEP activities related to improving balance, coordination, kinesthetic sense, posture, motor skill, proprioception of scapular, scapulothoracic and UE control with self care, reaching, carrying, lifting, house/yardwork, driving/computer work      Manual Treatments:  PROM / STM / Oscillations-Mobs:  G-I, II, III, IV (PA's, Inf., Post.)  [x] (60210) Provided manual therapy to mobilize soft tissue/joints of cervical/CT, scapular GHJ and UE for the purpose of modulating pain, promoting relaxation,  increasing ROM, reducing/eliminating soft tissue swelling/inflammation/restriction, improving soft tissue extensibility and allowing for proper ROM for normal function with self care, reaching, carrying, lifting, house/yardwork, driving/computer work    Charges:  Timed Code Treatment Minutes: 45   Total Treatment Minutes: 55       [] EVAL (LOW) 47344 (typically 20 minutes face-to-face)  [] EVAL (MOD) 22057 (typically 30 minutes face-to-face)  [] EVAL (HIGH) 30589 (typically 45 minutes face-to-face)  [] RE-EVAL     [x] SD(88496) x  2   [] Dry needle 1 or 2 Muscles (19928)  [] NMR (20427) x     [] Dry needle 3+ Muscles (69894)  [x] Manual (58062) x 1     [] Ultrasound (10655) x  [] TA (34574) x     [] Mech Traction (51677)  [] ES(attended) (07766)     [] ES (un) (34643):   [] Vasopump (58622) [] Ionto (87098)   [] Other:      GOALS:  Patient stated goal: \"To be able to get back to my normal life. \"  [x]? Progressing: []? Met: []? Not Met: []? Adjusted     Therapist goals for Patient:   Short Term Goals: To be achieved in: 4 weeks  1. Independent in HEP and progression per patient tolerance, in order to prevent re-injury. [x]? Progressing: []? Met: []? Not Met: []? Adjusted  2. Patient will have a decrease in pain to facilitate improvement in movement, function, and ADLs as indicated by Functional Deficits. [x]? Progressing: []? Met: []? Not Met: []? Adjusted     Long Term Goals: To be achieved in: 8 weeks  1. Disability index score of 70% or less for the Quick DASH to assist with reaching prior level of function. []? Progressing: [x]? Met: []? Not Met: []? Adjusted  2. Patient will demonstrate increased L shoulder flex/abd AROM to 140 deg to allow for proper joint functioning as indicated by Functional Deficits. [x]? Progressing: []? Met: []? Not Met: []? Adjusted  3. Patient will demonstrate an increase in NM recruitment/activation and overall GH and scapular strength to 4/5 or WNL for proper functional mobility as indicated by patients Functional Deficits. [x]? Progressing: []? Met: []? Not Met: []? Adjusted  4. Patient will return to household chores without increased symptoms or restriction. [x]? Progressing: []? Met: []? Not Met: []? Adjusted  5. Patient will return to walking her dog 1 mile without increased pain or restriction. []? Progressing: []? Met: [x]? Not Met: []? Adjusted    ASSESSMENT:  Upon RE, pt demonstrates significant improvements in ROM. Michela Ramírez Continue to progress with PROM and scapular strengthening exercises, no adverse effect to today's treatment. AROM still limited with moderate shoulder hike.     Treatment/Activity Tolerance:  [x] Patient tolerated treatment well [] Patient limited by genoveva  [] Patient limited by pain  [] Patient limited by other medical complications   [] Other:     Overall Progression Towards Functional goals/ Treatment Progress Update:  [x] Patient is progressing as expected towards functional goals listed. [x] Progression is slowed due to complexities/Impairments listed. [] Progression has been slowed due to co-morbidities. [] Plan just implemented, too soon to assess goals progression <30days   [] Goals require adjustment due to lack of progress  [] Patient is not progressing as expected and requires additional follow up with physician  [] Other    Prognosis for POC: [x] Good [] Fair  [] Poor    Patient requires continued skilled intervention: [x] Yes  [] No      PLAN: See eval  [x] Continue per plan of care [] Alter current plan (see comments)  [] Plan of care initiated [] Hold pending MD visit [] Discharge    Electronically signed by: Randy Devlin, PTA [id number]    Note: If patient does not return for scheduled/recommended follow up visits, this note will serve as a discharge from care along with the most recent update on progress.

## 2022-02-16 ENCOUNTER — HOSPITAL ENCOUNTER (OUTPATIENT)
Dept: PHYSICAL THERAPY | Age: 62
Setting detail: THERAPIES SERIES
Discharge: HOME | End: 2022-02-16
Payer: COMMERCIAL

## 2022-02-16 PROCEDURE — 97110 THERAPEUTIC EXERCISES: CPT

## 2022-02-16 PROCEDURE — 97140 MANUAL THERAPY 1/> REGIONS: CPT

## 2022-02-16 NOTE — FLOWSHEET NOTE
[address]  Phone: [phone]   Fax:     [phone]        Physical Therapy Treatment Note/ Progress Report:       Date:  2022    Patient Name:  Vijaya Shin    :  1960  MRN: [id number]    Pertinent Medical History:Additional Pertinent Hx: No PMH on file. Medical/Treatment Diagnosis Information:  · Diagnosis: S42.352D (ICD-10-CM) - Closed displaced comminuted fracture of shaft of left humerus with routine healing, subsequent encounter  · Treatment Dx: Decreased mobility and strength with pain throughout L UE. Insurance/Certification information:  PT Insurance Information: [employer] [place]  Physician Information:  Referring Practitioner: Ulysses Boots, MD  Plan of care signed (Y/N): Signed on 21    Date of Patient follow up with Physician: 3/3/22     Progress Report: [x]  Yes  []  No       Date Range for reporting period:  Beginnin/11/22  Ending:      Progress report due (10 Rx/or 30 days whichever is less):   68    Recertification due (POC duration/ or 90 days whichever is less):     Visit # POC/Insurance Allowable Auth Needed   12 bomn []Yes    [x]No     Functional Outcomes Measure:   Date Assessed: at progress note (22)  Test: Q-DASH  Score: 36, disability score of 56%    Pain level:  3/10     History of Injury: Patient reports L UE pain secondary to a left proximal humerus fracture/dislocation.  She underwent open reduction internal fixation on 2021. Fracture occurred when she was walking her 10 month old puppy, her puppy got excited, she could not maintain her balance and fell and hit her left shoulder. She states the past week she has noticed more movement. In the morning she reports more stiffness, however the pendulum exercises have been very helpful.  She states she is out of her sling when at home and sitting/walking from room to room. Pt states she has been sleeping in a recliner and has a difficulty time sleeping/has not been able to sleep in her bed. Pt is R handed. Pt likes to latoya, walk, and play with her dog. SUBJECTIVE:    1/31/22: Pt reports, \"I feel like I am getting more movement each day. \" She reports some discomfort after last PT visit, but some Aleve helped mitigate the discomfort. 02/02/22: Patient reports  shoulder got sore last couple days had to take pain pill. States today is not as bad.  2/9/22: Pt reports her shoulder is doing okay. She states her doctor put her on some naproxen for swelling management. 2/11/22: Pt reports, \"My shoulder/arm has been feeling good the past couple days. \" She states she has been happy with her progress and that she has been able to do some more baking and household chores. 02/14/22: Patient reports shoulder is improving,just soreness in the biceps area. States she was able to run the sweeper with left ue.  2/16/22: Pt reports her shoulder is feeling good. She continues to report tightness along the back of her arm (triceps region) and along her incision. OBJECTIVE:    Observation:    Test measurements:      ROM Left AROM Left PROM Right AROM   Shoulder Flex 90 deg, tight 175 deg WNL   Shoulder Abd 80 deg, pain 160 deg WNL   Shoulder ER @ 45 deg - 50 deg WNL   Shoulder IR @ 45 deg - 60 deg WNL                     Strength   Left Right   Shoulder Flex  3+/5 4/5   Shoulder Scap  3+/5 4/5   Shoulder ER  3+/5 4/5   Shoulder IR  3+/5 4/5                 RESTRICTIONS/PRECAUTIONS: L proximal humeral fracture/dislocation s/p ORIF on 11/23/21. Lifting restrictions no greater than 2#.      Exercises/Interventions:   Therapeutic Ex (64327)  Min: 25' Resistance/Reps Cues/Notes   Pendulums    Towel Slides     Flex incline    Scap Retraction 3x10 - flex/ext, side to side, CW/CCW    2x10    2x10x3 sec            HEP   AAROM with cane     ER     Flex   10x5 care, carrying, lifting, driving/computer work. Home Exercise Program:    [x] (06111) Reviewed/Progressed HEP activities related to strengthening, flexibility, endurance, ROM of scapular, scapulothoracic and UE control with self care, reaching, carrying, lifting, house/yardwork, driving/computer work  [] (82154) Reviewed/Progressed HEP activities related to improving balance, coordination, kinesthetic sense, posture, motor skill, proprioception of scapular, scapulothoracic and UE control with self care, reaching, carrying, lifting, house/yardwork, driving/computer work      Manual Treatments:  PROM / STM / Oscillations-Mobs:  G-I, II, III, IV (PA's, Inf., Post.)  [x] (97389) Provided manual therapy to mobilize soft tissue/joints of cervical/CT, scapular GHJ and UE for the purpose of modulating pain, promoting relaxation,  increasing ROM, reducing/eliminating soft tissue swelling/inflammation/restriction, improving soft tissue extensibility and allowing for proper ROM for normal function with self care, reaching, carrying, lifting, house/yardwork, driving/computer work    Charges:  Timed Code Treatment Minutes: 45   Total Treatment Minutes: 45       [] EVAL (LOW) 19316 (typically 20 minutes face-to-face)  [] EVAL (MOD) 84916 (typically 30 minutes face-to-face)  [] EVAL (HIGH) 83795 (typically 45 minutes face-to-face)  [] RE-EVAL     [x] DW(24848) x  2   [] Dry needle 1 or 2 Muscles (74326)  [] NMR (79293) x     [] Dry needle 3+ Muscles (54882)  [x] Manual (82674) x 1     [] Ultrasound (51436) x  [] TA (44363) x     [] Mech Traction (64188)  [] ES(attended) (78130)     [] ES (un) (50763):   [] Vasopump (21700) [] Ionto (20613)   [] Other:      GOALS:  Patient stated goal: \"To be able to get back to my normal life. \"  [x]? Progressing: []? Met: []? Not Met: []? Adjusted     Therapist goals for Patient:   Short Term Goals: To be achieved in: 4 weeks  1.  Independent in HEP and progression per patient tolerance, in order to prevent re-injury. [x]? Progressing: []? Met: []? Not Met: []? Adjusted  2. Patient will have a decrease in pain to facilitate improvement in movement, function, and ADLs as indicated by Functional Deficits. [x]? Progressing: []? Met: []? Not Met: []? Adjusted     Long Term Goals: To be achieved in: 8 weeks  1. Disability index score of 70% or less for the Quick DASH to assist with reaching prior level of function. []? Progressing: [x]? Met: []? Not Met: []? Adjusted  2. Patient will demonstrate increased L shoulder flex/abd AROM to 140 deg to allow for proper joint functioning as indicated by Functional Deficits. [x]? Progressing: []? Met: []? Not Met: []? Adjusted  3. Patient will demonstrate an increase in NM recruitment/activation and overall GH and scapular strength to 4/5 or WNL for proper functional mobility as indicated by patients Functional Deficits. [x]? Progressing: []? Met: []? Not Met: []? Adjusted  4. Patient will return to household chores without increased symptoms or restriction. [x]? Progressing: []? Met: []? Not Met: []? Adjusted  5. Patient will return to walking her dog 1 mile without increased pain or restriction. []? Progressing: []? Met: [x]? Not Met: []? Adjusted    ASSESSMENT:  Pt demonstrates significant improvements in ROM. Continue to progress with PROM and scapular strengthening exercises, no adverse effect to today's treatment. AROM still limited with moderate shoulder hike. Treatment/Activity Tolerance:  [x] Patient tolerated treatment well [] Patient limited by fatique  [] Patient limited by pain  [] Patient limited by other medical complications   [] Other:     Overall Progression Towards Functional goals/ Treatment Progress Update:  [x] Patient is progressing as expected towards functional goals listed. [x] Progression is slowed due to complexities/Impairments listed. [] Progression has been slowed due to co-morbidities.   [] Plan just implemented, too soon to assess goals progression <30days   [] Goals require adjustment due to lack of progress  [] Patient is not progressing as expected and requires additional follow up with physician  [] Other    Prognosis for POC: [x] Good [] Fair  [] Poor    Patient requires continued skilled intervention: [x] Yes  [] No      PLAN: See eval  [x] Continue per plan of care [] Alter current plan (see comments)  [] Plan of care initiated [] Hold pending MD visit [] Discharge    Electronically signed by: [place] [id number]    Note: If patient does not return for scheduled/recommended follow up visits, this note will serve as a discharge from care along with the most recent update on progress.

## 2022-02-18 ENCOUNTER — HOSPITAL ENCOUNTER (OUTPATIENT)
Dept: PHYSICAL THERAPY | Age: 62
Setting detail: THERAPIES SERIES
Discharge: HOME | End: 2022-02-18
Payer: COMMERCIAL

## 2022-02-18 PROCEDURE — 97110 THERAPEUTIC EXERCISES: CPT

## 2022-02-18 PROCEDURE — 97140 MANUAL THERAPY 1/> REGIONS: CPT

## 2022-02-18 NOTE — FLOWSHEET NOTE
[address]  Phone: [phone]   Fax:     [phone]        Physical Therapy Treatment Note/ Progress Report:       Date:  2022    Patient Name:  Inderjit Avila    :  1960  MRN: [id number]    Pertinent Medical History:Additional Pertinent Hx: No PMH on file. Medical/Treatment Diagnosis Information:  · Diagnosis: S42.352D (ICD-10-CM) - Closed displaced comminuted fracture of shaft of left humerus with routine healing, subsequent encounter  · Treatment Dx: Decreased mobility and strength with pain throughout L UE. Insurance/Certification information:  PT Insurance Information: [employer] [place]  Physician Information:  Referring Practitioner: Good Herrera MD  Plan of care signed (Y/N): Signed on 21    Date of Patient follow up with Physician: 3/3/22     Progress Report: [x]  Yes  []  No       Date Range for reporting period:  Beginnin/11/22  Ending:      Progress report due (10 Rx/or 30 days whichever is less):       Recertification due (POC duration/ or 90 days whichever is less):     Visit # POC/Insurance Allowable Auth Needed   13 bomn []Yes    [x]No     Functional Outcomes Measure:   Date Assessed: at progress note (22)  Test: Q-DASH  Score: 36, disability score of 56%    Pain level:  310     History of Injury: Patient reports L UE pain secondary to a left proximal humerus fracture/dislocation.  She underwent open reduction internal fixation on 2021. Fracture occurred when she was walking her 10 month old puppy, her puppy got excited, she could not maintain her balance and fell and hit her left shoulder. She states the past week she has noticed more movement. In the morning she reports more stiffness, however the pendulum exercises have been very helpful.  She states she is out of her sling when at home and sitting/walking from room to room. Pt states she has been sleeping in a recliner and has a difficulty time sleeping/has not been able to sleep in her bed. Pt is R handed. Pt likes to latoya, walk, and play with her dog. SUBJECTIVE:    1/31/22: Pt reports, \"I feel like I am getting more movement each day. \" She reports some discomfort after last PT visit, but some Aleve helped mitigate the discomfort. 02/02/22: Patient reports  shoulder got sore last couple days had to take pain pill. States today is not as bad.  2/9/22: Pt reports her shoulder is doing okay. She states her doctor put her on some naproxen for swelling management. 2/11/22: Pt reports, \"My shoulder/arm has been feeling good the past couple days. \" She states she has been happy with her progress and that she has been able to do some more baking and household chores. 02/14/22: Patient reports shoulder is improving,just soreness in the biceps area. States she was able to run the sweeper with left ue.  2/16/22: Pt reports her shoulder is feeling good. She continues to report tightness along the back of her arm (triceps region) and along her incision. 2/18/22: Pt reports her shoulder is feeling good. She continues to report feelings of tightness, but that tightness gets better with some massage. OBJECTIVE:    Observation:    Test measurements:      ROM Left AROM Left PROM Right AROM   Shoulder Flex 90 deg, tight 175 deg WNL   Shoulder Abd 80 deg, pain 160 deg WNL   Shoulder ER @ 45 deg - 50 deg WNL   Shoulder IR @ 45 deg - 60 deg WNL                     Strength   Left Right   Shoulder Flex  3+/5 4/5   Shoulder Scap  3+/5 4/5   Shoulder ER  3+/5 4/5   Shoulder IR  3+/5 4/5                 RESTRICTIONS/PRECAUTIONS: L proximal humeral fracture/dislocation s/p ORIF on 11/23/21. Lifting restrictions no greater than 2#.      Exercises/Interventions:   Therapeutic Ex (95952)  Min: 25' Resistance/Reps Cues/Notes   Pendulums    Towel Slides -Flex incline    Scap Retraction 3x10 - flex/ext, side to side, CW/CCW  2x10                  HEP   AAROM with cane     ER     Flex      HEP   OH pulleys Flex, 2 min    Single arm row/ row  2x10  Add 1# as tolerated   Sidelying ER 2x10    T-Slide     Rows     Ext Yellow  2x10  2x10    Wall Slides [id number]   Supine chest press  HOLD   Bicep Curls HOLD    Manual Intervention  (51284)  Min: 20'     L Elbow Flex/Ext PROM 2'    L Shoulder PROM In all directions to pt tolerance  12'    STM/scar tissue massage  L incision scar tissue massage + RTC musculature    NMR re-education (39832)  Min:               Therapeutic Activity (74261)  Min:               Modalities:     Ice Pack  L Shoulder          Other Therapeutic Activities:  Pt was educated on PT POC, Diagnosis, Prognosis, pathomechanics as well as frequency and duration of scheduling future physical therapy appointments. Time was also taken on this day to answer all patient questions and participation in PT. Reviewed appointment policy in detail with patient and patient verbalized understanding. Home Exercise Program:  Pendulums  Towel Slides     Flex     Scaption  Scap Retraction    Therapeutic Exercise and NMR EXR  [x] (15377) Provided verbal/tactile cueing for activities related to strengthening, flexibility, endurance, ROM  for improvements in scapular, scapulothoracic and UE control with self care, reaching, carrying, lifting, house/yardwork, driving/computer work.    [] (90086) Provided verbal/tactile cueing for activities related to improving balance, coordination, kinesthetic sense, posture, motor skill, proprioception  to assist with  scapular, scapulothoracic and UE control with self care, reaching, carrying, lifting, house/yardwork, driving/computer work.     Therapeutic Activities:    [] (80354 or 81351) Provided verbal/tactile cueing for activities related to improving balance, coordination, kinesthetic sense, posture, motor skill, proprioception and motor activation to allow for proper function of scapular, scapulothoracic and UE control with self care, carrying, lifting, driving/computer work. Home Exercise Program:    [x] (53427) Reviewed/Progressed HEP activities related to strengthening, flexibility, endurance, ROM of scapular, scapulothoracic and UE control with self care, reaching, carrying, lifting, house/yardwork, driving/computer work  [] (45182) Reviewed/Progressed HEP activities related to improving balance, coordination, kinesthetic sense, posture, motor skill, proprioception of scapular, scapulothoracic and UE control with self care, reaching, carrying, lifting, house/yardwork, driving/computer work      Manual Treatments:  PROM / STM / Oscillations-Mobs:  G-I, II, III, IV (PA's, Inf., Post.)  [x] (33558) Provided manual therapy to mobilize soft tissue/joints of cervical/CT, scapular GHJ and UE for the purpose of modulating pain, promoting relaxation,  increasing ROM, reducing/eliminating soft tissue swelling/inflammation/restriction, improving soft tissue extensibility and allowing for proper ROM for normal function with self care, reaching, carrying, lifting, house/yardwork, driving/computer work    Charges:  Timed Code Treatment Minutes: 45   Total Treatment Minutes: 45       [] EVAL (LOW) 57160 (typically 20 minutes face-to-face)  [] EVAL (MOD) 66737 (typically 30 minutes face-to-face)  [] EVAL (HIGH) 02903 (typically 45 minutes face-to-face)  [] RE-EVAL     [x] QT(08545) x  2   [] Dry needle 1 or 2 Muscles (44784)  [] NMR (95299) x     [] Dry needle 3+ Muscles (16643)  [x] Manual (80508) x 1     [] Ultrasound (33700) x  [] TA (48536) x     [] Mech Traction (84279)  [] ES(attended) (85285)     [] ES (un) (96593):   [] Vasopump (28877) [] Ionto (24157)   [] Other:      GOALS:  Patient stated goal: \"To be able to get back to my normal life. \"  [x]? Progressing: []? Met: []? Not Met: []?  Adjusted     Therapist goals for Patient:   Short Term Goals: To be achieved in: 4 weeks  1. Independent in HEP and progression per patient tolerance, in order to prevent re-injury. [x]? Progressing: []? Met: []? Not Met: []? Adjusted  2. Patient will have a decrease in pain to facilitate improvement in movement, function, and ADLs as indicated by Functional Deficits. [x]? Progressing: []? Met: []? Not Met: []? Adjusted     Long Term Goals: To be achieved in: 8 weeks  1. Disability index score of 70% or less for the Quick DASH to assist with reaching prior level of function. []? Progressing: [x]? Met: []? Not Met: []? Adjusted  2. Patient will demonstrate increased L shoulder flex/abd AROM to 140 deg to allow for proper joint functioning as indicated by Functional Deficits. [x]? Progressing: []? Met: []? Not Met: []? Adjusted  3. Patient will demonstrate an increase in NM recruitment/activation and overall GH and scapular strength to 4/5 or WNL for proper functional mobility as indicated by patients Functional Deficits. [x]? Progressing: []? Met: []? Not Met: []? Adjusted  4. Patient will return to household chores without increased symptoms or restriction. [x]? Progressing: []? Met: []? Not Met: []? Adjusted  5. Patient will return to walking her dog 1 mile without increased pain or restriction. []? Progressing: []? Met: [x]? Not Met: []? Adjusted    ASSESSMENT:  Pt demonstrates significant improvements in ROM. Continue to progress with PROM and scapular strengthening exercises as tolerated. AROM still limited with moderate shoulder hike. Treatment/Activity Tolerance:  [x] Patient tolerated treatment well [] Patient limited by fatique  [] Patient limited by pain  [] Patient limited by other medical complications   [] Other:     Overall Progression Towards Functional goals/ Treatment Progress Update:  [x] Patient is progressing as expected towards functional goals listed.     [x] Progression is slowed due to complexities/Impairments listed. [] Progression has been slowed due to co-morbidities. [] Plan just implemented, too soon to assess goals progression <30days   [] Goals require adjustment due to lack of progress  [] Patient is not progressing as expected and requires additional follow up with physician  [] Other    Prognosis for POC: [x] Good [] Fair  [] Poor    Patient requires continued skilled intervention: [x] Yes  [] No      PLAN: See eval  [x] Continue per plan of care [] Alter current plan (see comments)  [] Plan of care initiated [] Hold pending MD visit [] Discharge    Electronically signed by: Tamala Goldmann, PT     Note: If patient does not return for scheduled/recommended follow up visits, this note will serve as a discharge from care along with the most recent update on progress.

## 2022-02-21 ENCOUNTER — HOSPITAL ENCOUNTER (OUTPATIENT)
Dept: PHYSICAL THERAPY | Age: 62
Setting detail: THERAPIES SERIES
Discharge: HOME | End: 2022-02-21
Payer: COMMERCIAL

## 2022-02-21 PROCEDURE — 97140 MANUAL THERAPY 1/> REGIONS: CPT

## 2022-02-21 PROCEDURE — 97110 THERAPEUTIC EXERCISES: CPT

## 2022-02-21 NOTE — FLOWSHEET NOTE
[address] [facility] [address]  Phone: [phone]   Fax:     [phone]        Physical Therapy Treatment Note/ Progress Report:       Date:  2022    Patient Name:  Brunilda Dunn    :  1960  MRN: [id number]    Pertinent Medical History:Additional Pertinent Hx: No PMH on file. Medical/Treatment Diagnosis Information:  · Diagnosis: S42.352D (ICD-10-CM) - Closed displaced comminuted fracture of shaft of left humerus with routine healing, subsequent encounter  · Treatment Dx: Decreased mobility and strength with pain throughout L UE. Insurance/Certification information:  PT Insurance Information: [employer]  Physician Information:  Referring Practitioner: Slade Lord MD  Plan of care signed (Y/N): Signed on 21    Date of Patient follow up with Physician: 3/3/22     Progress Report: [x]  Yes  []  No       Date Range for reporting period:  Beginnin/11/22  Ending:      Progress report due (10 Rx/or 30 days whichever is less):       Recertification due (POC duration/ or 90 days whichever is less):     Visit # POC/Insurance Allowable Auth Needed   14 bomn []Yes    [x]No     Functional Outcomes Measure:   Date Assessed: at progress note (22)  Test: Q-DASH  Score: 36, disability score of 56%    Pain level:  3-4/10     History of Injury: Patient reports L UE pain secondary to a left proximal humerus fracture/dislocation.  She underwent open reduction internal fixation on 2021. Fracture occurred when she was walking her 10 month old puppy, her puppy got excited, she could not maintain her balance and fell and hit her left shoulder. She states the past week she has noticed more movement. In the morning she reports more stiffness, however the pendulum exercises have been very helpful.  She states she is out of her sling when at home and sitting/walking from room to room. Pt states she has been sleeping in a recliner and has a difficulty time sleeping/has not been able to sleep in her bed. Pt is R handed. Pt likes to latoya, walk, and play with her dog. SUBJECTIVE:    1/31/22: Pt reports, \"I feel like I am getting more movement each day. \" She reports some discomfort after last PT visit, but some Aleve helped mitigate the discomfort. 02/02/22: Patient reports  shoulder got sore last couple days had to take pain pill. States today is not as bad.  2/9/22: Pt reports her shoulder is doing okay. She states her doctor put her on some naproxen for swelling management. 2/11/22: Pt reports, \"My shoulder/arm has been feeling good the past couple days. \" She states she has been happy with her progress and that she has been able to do some more baking and household chores. 02/14/22: Patient reports shoulder is improving,just soreness in the biceps area. States she was able to run the sweeper with left ue.  2/16/22: Pt reports her shoulder is feeling good. She continues to report tightness along the back of her arm (triceps region) and along her incision. 2/18/22: Pt reports her shoulder is feeling good. She continues to report feelings of tightness, but that tightness gets better with some massage. 02/21/22: Patient reports shoulder shoulder is improving overall,some stiffness in am.    OBJECTIVE:    Observation:    Test measurements:      ROM Left AROM Left PROM Right AROM   Shoulder Flex 90 deg, tight 175 deg WNL   Shoulder Abd 80 deg, pain 160 deg WNL   Shoulder ER @ 45 deg - 50 deg WNL   Shoulder IR @ 45 deg - 60 deg WNL                     Strength   Left Right   Shoulder Flex  3+/5 4/5   Shoulder Scap  3+/5 4/5   Shoulder ER  3+/5 4/5   Shoulder IR  3+/5 4/5                 RESTRICTIONS/PRECAUTIONS: L proximal humeral fracture/dislocation s/p ORIF on 11/23/21. Lifting restrictions no greater than 2#.      Exercises/Interventions: Therapeutic Ex (24341)  Min: 25' Resistance/Reps Cues/Notes   Pendulums    Towel Slides -Flex incline    Scap Retraction 3x10 - flex/ext, side to side, CW/CCW  2x10                  HEP   AAROM with cane     ER     Flex      HEP   OH pulleys Flex, 2 min    Single arm row/ row 1 #  2x10     Sidelying ER 2x10    T-Slide     Rows     Ext Yellow  2x10  2x10    Wall Slides x10   Supine chest press  HOLD   Bicep Curls HOLD    Manual Intervention  (67245)  Min: 20'     L Elbow Flex/Ext PROM 2'    L Shoulder PROM In all directions to pt tolerance  12'    STM/scar tissue massage  L incision scar tissue massage + RTC musculature    NMR re-education (04733)  Min:               Therapeutic Activity (40438)  Min:               Modalities:     Ice Pack  L Shoulder          Other Therapeutic Activities:  Pt was educated on PT POC, Diagnosis, Prognosis, pathomechanics as well as frequency and duration of scheduling future physical therapy appointments. Time was also taken on this day to answer all patient questions and participation in PT. Reviewed appointment policy in detail with patient and patient verbalized understanding. Home Exercise Program:  Pendulums  Towel Slides     Flex     Scaption  Scap Retraction    Therapeutic Exercise and NMR EXR  [x] (48912) Provided verbal/tactile cueing for activities related to strengthening, flexibility, endurance, ROM  for improvements in scapular, scapulothoracic and UE control with self care, reaching, carrying, lifting, house/yardwork, driving/computer work.    [] (97880) Provided verbal/tactile cueing for activities related to improving balance, coordination, kinesthetic sense, posture, motor skill, proprioception  to assist with  scapular, scapulothoracic and UE control with self care, reaching, carrying, lifting, house/yardwork, driving/computer work.     Therapeutic Activities:    [] (48418 or ) Provided verbal/tactile cueing for activities related to improving balance, coordination, kinesthetic sense, posture, motor skill, proprioception and motor activation to allow for proper function of scapular, scapulothoracic and UE control with self care, carrying, lifting, driving/computer work. Home Exercise Program:    [x] (90832) Reviewed/Progressed HEP activities related to strengthening, flexibility, endurance, ROM of scapular, scapulothoracic and UE control with self care, reaching, carrying, lifting, house/yardwork, driving/computer work  [] (87264) Reviewed/Progressed HEP activities related to improving balance, coordination, kinesthetic sense, posture, motor skill, proprioception of scapular, scapulothoracic and UE control with self care, reaching, carrying, lifting, house/yardwork, driving/computer work      Manual Treatments:  PROM / STM / Oscillations-Mobs:  G-I, II, III, IV (PA's, Inf., Post.)  [x] (42661) Provided manual therapy to mobilize soft tissue/joints of cervical/CT, scapular GHJ and UE for the purpose of modulating pain, promoting relaxation,  increasing ROM, reducing/eliminating soft tissue swelling/inflammation/restriction, improving soft tissue extensibility and allowing for proper ROM for normal function with self care, reaching, carrying, lifting, house/yardwork, driving/computer work    Charges:  Timed Code Treatment Minutes: 45   Total Treatment Minutes: 45       [] EVAL (LOW) 24275 (typically 20 minutes face-to-face)  [] EVAL (MOD) 38248 (typically 30 minutes face-to-face)  [] EVAL (HIGH) 77956 (typically 45 minutes face-to-face)  [] RE-EVAL     [x] IB(94103) x  2   [] Dry needle 1 or 2 Muscles (84883)  [] NMR (38660) x     [] Dry needle 3+ Muscles (41182)  [x] Manual (37300) x 1     [] Ultrasound (77635) x  [] TA (78958) x     [] Mech Traction (46136)  [] ES(attended) (16104)     [] ES (un) (60542):   [] Vasopump (62551) [] Ionto (25734)   [] Other:      GOALS:  Patient stated goal: \"To be able to get back to my normal life. \"  [x]?  Progressing: []? Met: []? Not Met: []? Adjusted     Therapist goals for Patient:   Short Term Goals: To be achieved in: 4 weeks  1. Independent in HEP and progression per patient tolerance, in order to prevent re-injury. [x]? Progressing: []? Met: []? Not Met: []? Adjusted  2. Patient will have a decrease in pain to facilitate improvement in movement, function, and ADLs as indicated by Functional Deficits. [x]? Progressing: []? Met: []? Not Met: []? Adjusted     Long Term Goals: To be achieved in: 8 weeks  1. Disability index score of 70% or less for the Quick DASH to assist with reaching prior level of function. []? Progressing: [x]? Met: []? Not Met: []? Adjusted  2. Patient will demonstrate increased L shoulder flex/abd AROM to 140 deg to allow for proper joint functioning as indicated by Functional Deficits. [x]? Progressing: []? Met: []? Not Met: []? Adjusted  3. Patient will demonstrate an increase in NM recruitment/activation and overall GH and scapular strength to 4/5 or WNL for proper functional mobility as indicated by patients Functional Deficits. [x]? Progressing: []? Met: []? Not Met: []? Adjusted  4. Patient will return to household chores without increased symptoms or restriction. [x]? Progressing: []? Met: []? Not Met: []? Adjusted  5. Patient will return to walking her dog 1 mile without increased pain or restriction. []? Progressing: []? Met: [x]? Not Met: []? Adjusted    ASSESSMENT:  Pt demonstrates significant improvements in ROM. Tolerated progression on AAROM well. AROM still limited with slight shoulder hike. Treatment/Activity Tolerance:  [x] Patient tolerated treatment well [] Patient limited by fatique  [] Patient limited by pain  [] Patient limited by other medical complications   [] Other:     Overall Progression Towards Functional goals/ Treatment Progress Update:  [x] Patient is progressing as expected towards functional goals listed.     [x] Progression is slowed due to complexities/Impairments listed. [] Progression has been slowed due to co-morbidities. [] Plan just implemented, too soon to assess goals progression <30days   [] Goals require adjustment due to lack of progress  [] Patient is not progressing as expected and requires additional follow up with physician  [] Other    Prognosis for POC: [x] Good [] Fair  [] Poor    Patient requires continued skilled intervention: [x] Yes  [] No      PLAN: See eval  [x] Continue per plan of care [] Alter current plan (see comments)  [] Plan of care initiated [] Hold pending MD visit [] Discharge    Electronically signed by: Alyce Laureano, PTA [id number]    Note: If patient does not return for scheduled/recommended follow up visits, this note will serve as a discharge from care along with the most recent update on progress.

## 2022-02-23 ENCOUNTER — HOSPITAL ENCOUNTER (OUTPATIENT)
Dept: PHYSICAL THERAPY | Age: 62
Setting detail: THERAPIES SERIES
Discharge: HOME | End: 2022-02-23
Payer: COMMERCIAL

## 2022-02-23 PROCEDURE — 97140 MANUAL THERAPY 1/> REGIONS: CPT

## 2022-02-23 PROCEDURE — 97110 THERAPEUTIC EXERCISES: CPT

## 2022-02-23 NOTE — FLOWSHEET NOTE
[address] [facility] [address]  Phone: [phone]   Fax:     [phone]        Physical Therapy Treatment Note/ Progress Report:       Date:  2022    Patient Name:  Blayne Pendleton    :  1960  MRN: [id number]    Pertinent Medical History:Additional Pertinent Hx: No PMH on file. Medical/Treatment Diagnosis Information:  · Diagnosis: S42.352D (ICD-10-CM) - Closed displaced comminuted fracture of shaft of left humerus with routine healing, subsequent encounter  · Treatment Dx: Decreased mobility and strength with pain throughout L UE. Insurance/Certification information:  PT Insurance Information: [employer]  Physician Information:  Referring Practitioner: Charles Tripp MD  Plan of care signed (Y/N): Signed on 21    Date of Patient follow up with Physician: 3/3/22     Progress Report: [x]  Yes  []  No       Date Range for reporting period:  Beginnin/11/22  Ending:      Progress report due (10 Rx/or 30 days whichever is less):       Recertification due (POC duration/ or 90 days whichever is less):     Visit # POC/Insurance Allowable Auth Needed   15 bomn []Yes    [x]No     Functional Outcomes Measure:   Date Assessed: at progress note (22)  Test: Q-DASH  Score: 36, disability score of 56%    Pain level:  3-4/10     History of Injury: Patient reports L UE pain secondary to a left proximal humerus fracture/dislocation.  She underwent open reduction internal fixation on 2021. Fracture occurred when she was walking her 10 month old puppy, her puppy got excited, she could not maintain her balance and fell and hit her left shoulder. She states the past week she has noticed more movement. In the morning she reports more stiffness, however the pendulum exercises have been very helpful.  She states she is out of her sling when at home and sitting/walking from room to room. Pt states she has been sleeping in a recliner and has a difficulty time sleeping/has not been able to sleep in her bed. Pt is R handed. Pt likes to latoya, walk, and play with her dog. SUBJECTIVE:    1/31/22: Pt reports, \"I feel like I am getting more movement each day. \" She reports some discomfort after last PT visit, but some Aleve helped mitigate the discomfort. 02/02/22: Patient reports  shoulder got sore last couple days had to take pain pill. States today is not as bad.  2/9/22: Pt reports her shoulder is doing okay. She states her doctor put her on some naproxen for swelling management. 2/11/22: Pt reports, \"My shoulder/arm has been feeling good the past couple days. \" She states she has been happy with her progress and that she has been able to do some more baking and household chores. 02/14/22: Patient reports shoulder is improving,just soreness in the biceps area. States she was able to run the sweeper with left ue.  2/16/22: Pt reports her shoulder is feeling good. She continues to report tightness along the back of her arm (triceps region) and along her incision. 2/18/22: Pt reports her shoulder is feeling good. She continues to report feelings of tightness, but that tightness gets better with some massage. 02/21/22: Patient reports shoulder shoulder is improving overall,some stiffness in am.  02/23/22: Patient reports some soreness posterior left shoulder after she did more cooking and dishes yesterday.     OBJECTIVE:    Observation:    Test measurements:      ROM Left AROM Left PROM Right AROM   Shoulder Flex 90 deg, tight 175 deg WNL   Shoulder Abd 80 deg, pain 160 deg WNL   Shoulder ER @ 45 deg - 50 deg WNL   Shoulder IR @ 45 deg - 60 deg WNL                     Strength   Left Right   Shoulder Flex  3+/5 4/5   Shoulder Scap  3+/5 4/5   Shoulder ER  3+/5 4/5   Shoulder IR  3+/5 4/5                 RESTRICTIONS/PRECAUTIONS: L proximal humeral fracture/dislocation s/p ORIF on 11/23/21. Lifting restrictions no greater than 2#. Exercises/Interventions:   Therapeutic Ex (02151)  Min: 25' Resistance/Reps Cues/Notes   Pendulums    Towel Slides -Flex incline    Scap Retraction 3x10 - flex/ext, side to side, CW/CCW  2x10                  HEP   AAROM with cane     ER     Flex      HEP   OH pulleys Flex, 2 min    Single arm row/ row 1 #  2x10     Sidelying ER 2x10    T-Slide     Rows     Ext Yellow  2x10  2x10    Wall Slides x10   Supine chest press  HOLD   scaption 0-90 0 # 10 2    Bicep Curls HOLD    Manual Intervention  (97428)  Min: 20'     L Elbow Flex/Ext PROM 2'    L Shoulder PROM In all directions to pt tolerance  12'    STM/scar tissue massage  L incision scar tissue massage + RTC musculature    NMR re-education (81799)  Min:               Therapeutic Activity (11006)  Min:               Modalities:     Ice Pack  L Shoulder          Other Therapeutic Activities:  Pt was educated on PT POC, Diagnosis, Prognosis, pathomechanics as well as frequency and duration of scheduling future physical therapy appointments. Time was also taken on this day to answer all patient questions and participation in PT. Reviewed appointment policy in detail with patient and patient verbalized understanding.      Home Exercise Program:  Pendulums  Towel Slides     Flex     Scaption  Scap Retraction    Therapeutic Exercise and NMR EXR  [x] (64314) Provided verbal/tactile cueing for activities related to strengthening, flexibility, endurance, ROM  for improvements in scapular, scapulothoracic and UE control with self care, reaching, carrying, lifting, house/yardwork, driving/computer work.    [] (65956) Provided verbal/tactile cueing for activities related to improving balance, coordination, kinesthetic sense, posture, motor skill, proprioception  to assist with  scapular, scapulothoracic and UE control with self care, reaching, carrying, lifting, house/yardwork, driving/computer work. Therapeutic Activities:    [] (59017 or 67738) Provided verbal/tactile cueing for activities related to improving balance, coordination, kinesthetic sense, posture, motor skill, proprioception and motor activation to allow for proper function of scapular, scapulothoracic and UE control with self care, carrying, lifting, driving/computer work.      Home Exercise Program:    [x] (11231) Reviewed/Progressed HEP activities related to strengthening, flexibility, endurance, ROM of scapular, scapulothoracic and UE control with self care, reaching, carrying, lifting, house/yardwork, driving/computer work  [] (91934) Reviewed/Progressed HEP activities related to improving balance, coordination, kinesthetic sense, posture, motor skill, proprioception of scapular, scapulothoracic and UE control with self care, reaching, carrying, lifting, house/yardwork, driving/computer work      Manual Treatments:  PROM / STM / Oscillations-Mobs:  G-I, II, III, IV (PA's, Inf., Post.)  [x] (11802) Provided manual therapy to mobilize soft tissue/joints of cervical/CT, scapular GHJ and UE for the purpose of modulating pain, promoting relaxation,  increasing ROM, reducing/eliminating soft tissue swelling/inflammation/restriction, improving soft tissue extensibility and allowing for proper ROM for normal function with self care, reaching, carrying, lifting, house/yardwork, driving/computer work    Charges:  Timed Code Treatment Minutes: 45   Total Treatment Minutes: 45       [] EVAL (LOW) 08098 (typically 20 minutes face-to-face)  [] EVAL (MOD) 00444 (typically 30 minutes face-to-face)  [] EVAL (HIGH) 63079 (typically 45 minutes face-to-face)  [] RE-EVAL     [x] PJ(19656) x  2   [] Dry needle 1 or 2 Muscles (44526)  [] NMR (59735) x     [] Dry needle 3+ Muscles (64949)  [x] Manual (69882) x 1     [] Ultrasound (25712) x  [] TA (22083) x     [] Mech Traction (57865)  [] ES(attended) (66583)     [] ES (un) (24708):   [] Vasopump (59506) [] Ionto (53131)   [] Other:      GOALS:  Patient stated goal: \"To be able to get back to my normal life. \"  [x]? Progressing: []? Met: []? Not Met: []? Adjusted     Therapist goals for Patient:   Short Term Goals: To be achieved in: 4 weeks  1. Independent in HEP and progression per patient tolerance, in order to prevent re-injury. [x]? Progressing: []? Met: []? Not Met: []? Adjusted  2. Patient will have a decrease in pain to facilitate improvement in movement, function, and ADLs as indicated by Functional Deficits. [x]? Progressing: []? Met: []? Not Met: []? Adjusted     Long Term Goals: To be achieved in: 8 weeks  1. Disability index score of 70% or less for the Quick DASH to assist with reaching prior level of function. []? Progressing: [x]? Met: []? Not Met: []? Adjusted  2. Patient will demonstrate increased L shoulder flex/abd AROM to 140 deg to allow for proper joint functioning as indicated by Functional Deficits. [x]? Progressing: []? Met: []? Not Met: []? Adjusted  3. Patient will demonstrate an increase in NM recruitment/activation and overall GH and scapular strength to 4/5 or WNL for proper functional mobility as indicated by patients Functional Deficits. [x]? Progressing: []? Met: []? Not Met: []? Adjusted  4. Patient will return to household chores without increased symptoms or restriction. [x]? Progressing: []? Met: []? Not Met: []? Adjusted  5. Patient will return to walking her dog 1 mile without increased pain or restriction. []? Progressing: []? Met: [x]? Not Met: []? Adjusted    ASSESSMENT:  Pt toletated treatment well. Cont to have mild shoulder hike with AROM.     Treatment/Activity Tolerance:  [x] Patient tolerated treatment well [] Patient limited by fatique  [] Patient limited by pain  [] Patient limited by other medical complications   [] Other:     Overall Progression Towards Functional goals/ Treatment Progress Update:  [x] Patient is progressing as expected towards functional goals listed. [x] Progression is slowed due to complexities/Impairments listed. [] Progression has been slowed due to co-morbidities. [] Plan just implemented, too soon to assess goals progression <30days   [] Goals require adjustment due to lack of progress  [] Patient is not progressing as expected and requires additional follow up with physician  [] Other    Prognosis for POC: [x] Good [] Fair  [] Poor    Patient requires continued skilled intervention: [x] Yes  [] No      PLAN: See eval  [x] Continue per plan of care [] Alter current plan (see comments)  [] Plan of care initiated [] Hold pending MD visit [] Discharge    Electronically signed by: Lilly Ag, PTA [id number]    Note: If patient does not return for scheduled/recommended follow up visits, this note will serve as a discharge from care along with the most recent update on progress.

## 2022-02-25 ENCOUNTER — APPOINTMENT (OUTPATIENT)
Dept: PHYSICAL THERAPY | Age: 62
End: 2022-02-25
Payer: COMMERCIAL

## 2022-02-28 ENCOUNTER — HOSPITAL ENCOUNTER (OUTPATIENT)
Dept: PHYSICAL THERAPY | Age: 62
Setting detail: THERAPIES SERIES
Discharge: HOME | End: 2022-02-28
Payer: COMMERCIAL

## 2022-02-28 PROCEDURE — 97140 MANUAL THERAPY 1/> REGIONS: CPT

## 2022-02-28 PROCEDURE — 97110 THERAPEUTIC EXERCISES: CPT

## 2022-02-28 NOTE — FLOWSHEET NOTE
[address]  Phone: [phone]   Fax:     [phone]        Physical Therapy Treatment Note/ Progress Report:       Date:  2022    Patient Name:  Kyle Renee    :  1960  MRN: [id number]    Pertinent Medical History:Additional Pertinent Hx: No PMH on file. Medical/Treatment Diagnosis Information:  · Diagnosis: S42.352D (ICD-10-CM) - Closed displaced comminuted fracture of shaft of left humerus with routine healing, subsequent encounter  · Treatment Dx: Decreased mobility and strength with pain throughout L UE. Insurance/Certification information:  PT Insurance Information: Medical [place]  Physician Information:  Referring Practitioner: Uli Everett MD  Plan of care signed (Y/N): Signed on 21    Date of Patient follow up with Physician: 3/3/22     Progress Report: [x]  Yes  []  No       Date Range for reporting period:  Beginnin/11/22  Ending:      Progress report due (10 Rx/or 30 days whichever is less):       Recertification due (POC duration/ or 90 days whichever is less):     Visit # POC/Insurance Allowable Auth Needed   16 bomn []Yes    [x]No     Functional Outcomes Measure:   Date Assessed: at progress note (22)  Test: Q-DASH  Score: 36, disability score of 56%    Pain level:  3-4/10     History of Injury: Patient reports L UE pain secondary to a left proximal humerus fracture/dislocation.  She underwent open reduction internal fixation on 2021. Fracture occurred when she was walking her 10 month old puppy, her puppy got excited, she could not maintain her balance and fell and hit her left shoulder. She states the past week she has noticed more movement. In the morning she reports more stiffness, however the pendulum exercises have been very helpful.  She states she is out of her sling when at home and sitting/walking from room to room. Pt states she has been sleeping in a recliner and has a difficulty time sleeping/has not been able to sleep in her bed. Pt is R handed. Pt likes to latoya, walk, and play with her dog. SUBJECTIVE:    1/31/22: Pt reports, \"I feel like I am getting more movement each day. \" She reports some discomfort after last PT visit, but some Aleve helped mitigate the discomfort. 02/02/22: Patient reports  shoulder got sore last couple days had to take pain pill. States today is not as bad.  2/9/22: Pt reports her shoulder is doing okay. She states her doctor put her on some naproxen for swelling management. 2/11/22: Pt reports, \"My shoulder/arm has been feeling good the past couple days. \" She states she has been happy with her progress and that she has been able to do some more baking and household chores. 02/14/22: Patient reports shoulder is improving,just soreness in the biceps area. States she was able to run the sweeper with left ue.  2/16/22: Pt reports her shoulder is feeling good. She continues to report tightness along the back of her arm (triceps region) and along her incision. 2/18/22: Pt reports her shoulder is feeling good. She continues to report feelings of tightness, but that tightness gets better with some massage. 02/21/22: Patient reports shoulder shoulder is improving overall,some stiffness in am.  02/23/22: Patient reports some soreness posterior left shoulder after she did more cooking and dishes yesterday. 2/28/22: Pt reports some stiffness, but she states she is able to work it out. She reports most of her discomfort is along the middle of her incision.      OBJECTIVE:    Observation:    Test measurements:      ROM Left AROM Left PROM Right AROM   Shoulder Flex 90 deg, tight 175 deg WNL   Shoulder Abd 80 deg, pain 160 deg WNL   Shoulder ER @ 45 deg - 50 deg WNL   Shoulder IR @ 45 deg - 60 deg WNL                     Strength   Left Right   Shoulder Flex skill, proprioception  to assist with  scapular, scapulothoracic and UE control with self care, reaching, carrying, lifting, house/yardwork, driving/computer work. Therapeutic Activities:    [] (85743 or 68550) Provided verbal/tactile cueing for activities related to improving balance, coordination, kinesthetic sense, posture, motor skill, proprioception and motor activation to allow for proper function of scapular, scapulothoracic and UE control with self care, carrying, lifting, driving/computer work.      Home Exercise Program:    [x] (17943) Reviewed/Progressed HEP activities related to strengthening, flexibility, endurance, ROM of scapular, scapulothoracic and UE control with self care, reaching, carrying, lifting, house/yardwork, driving/computer work  [] (92534) Reviewed/Progressed HEP activities related to improving balance, coordination, kinesthetic sense, posture, motor skill, proprioception of scapular, scapulothoracic and UE control with self care, reaching, carrying, lifting, house/yardwork, driving/computer work      Manual Treatments:  PROM / STM / Oscillations-Mobs:  G-I, II, III, IV (PA's, Inf., Post.)  [x] (05634) Provided manual therapy to mobilize soft tissue/joints of cervical/CT, scapular GHJ and UE for the purpose of modulating pain, promoting relaxation,  increasing ROM, reducing/eliminating soft tissue swelling/inflammation/restriction, improving soft tissue extensibility and allowing for proper ROM for normal function with self care, reaching, carrying, lifting, house/yardwork, driving/computer work    Charges:  Timed Code Treatment Minutes: 45   Total Treatment Minutes: 45       [] EVAL (LOW) 84772 (typically 20 minutes face-to-face)  [] EVAL (MOD) 24391 (typically 30 minutes face-to-face)  [] EVAL (HIGH) 73826 (typically 45 minutes face-to-face)  [] RE-EVAL     [x] IC(75052) x  2   [] Dry needle 1 or 2 Muscles (25619)  [] NMR (13627) x     [] Dry needle 3+ Muscles (75034)  [x] Manual ([id number]) x 1     [] Ultrasound (20167) x  [] TA (84623) x     [] [facility] Traction (07719)  [] ES(attended) (73852)     [] ES (un) (97534):   [] Vasopump (52826) [] Ionto (22117)   [] Other:      GOALS:  Patient stated goal: \"To be able to get back to my normal life. \"  [x]? Progressing: []? Met: []? Not Met: []? Adjusted     Therapist goals for Patient:   Short Term Goals: To be achieved in: 4 weeks  1. Independent in HEP and progression per patient tolerance, in order to prevent re-injury. [x]? Progressing: []? Met: []? Not Met: []? Adjusted  2. Patient will have a decrease in pain to facilitate improvement in movement, function, and ADLs as indicated by Functional Deficits. [x]? Progressing: []? Met: []? Not Met: []? Adjusted     Long Term Goals: To be achieved in: 8 weeks  1. Disability index score of 70% or less for the Quick DASH to assist with reaching prior level of function. []? Progressing: [x]? Met: []? Not Met: []? Adjusted  2. Patient will demonstrate increased L shoulder flex/abd AROM to 140 deg to allow for proper joint functioning as indicated by Functional Deficits. [x]? Progressing: []? Met: []? Not Met: []? Adjusted  3. Patient will demonstrate an increase in NM recruitment/activation and overall GH and scapular strength to 4/5 or WNL for proper functional mobility as indicated by patients Functional Deficits. [x]? Progressing: []? Met: []? Not Met: []? Adjusted  4. Patient will return to household chores without increased symptoms or restriction. [x]? Progressing: []? Met: []? Not Met: []? Adjusted  5. Patient will return to walking her dog 1 mile without increased pain or restriction. []? Progressing: []? Met: [x]? Not Met: []? Adjusted    ASSESSMENT:  Pt toletated treatment well. Cont to have mild shoulder hike with AROM. Continues to display limitations with OH reaching and strength deficits noted. Continue to progress strengthening exercises as tolerated. Treatment/Activity Tolerance:  [x] Patient tolerated treatment well [] Patient limited by fatique  [] Patient limited by pain  [] Patient limited by other medical complications   [] Other:     Overall Progression Towards Functional goals/ Treatment Progress Update:  [x] Patient is progressing as expected towards functional goals listed. [x] Progression is slowed due to complexities/Impairments listed. [] Progression has been slowed due to co-morbidities. [] Plan just implemented, too soon to assess goals progression <30days   [] Goals require adjustment due to lack of progress  [] Patient is not progressing as expected and requires additional follow up with physician  [] Other    Prognosis for POC: [x] Good [] Fair  [] Poor    Patient requires continued skilled intervention: [x] Yes  [] No      PLAN: See eval  [x] Continue per plan of care [] Alter current plan (see comments)  [] Plan of care initiated [] Hold pending MD visit [] Discharge    Electronically signed by: Leonela Juares PT     Note: If patient does not return for scheduled/recommended follow up visits, this note will serve as a discharge from care along with the most recent update on progress.

## 2022-03-02 ENCOUNTER — HOSPITAL ENCOUNTER (OUTPATIENT)
Dept: PHYSICAL THERAPY | Age: 62
Setting detail: THERAPIES SERIES
Discharge: HOME | End: 2022-03-02
Payer: COMMERCIAL

## 2022-03-02 PROCEDURE — 97110 THERAPEUTIC EXERCISES: CPT

## 2022-03-02 PROCEDURE — 97140 MANUAL THERAPY 1/> REGIONS: CPT

## 2022-03-02 NOTE — FLOWSHEET NOTE
[address] [facility] [address]  [place] [address]  Phone: [phone]   Fax:     [phone]        Physical Therapy Treatment Note/ Progress Report:       Date:  3/2/2022    Patient Name:  Javi Armstrong    :  1960  MRN: [id number]    Pertinent Medical History:Additional Pertinent Hx: No PMH on file. Medical/Treatment Diagnosis Information:  · Diagnosis: S42.352D (ICD-10-CM) - Closed displaced comminuted fracture of shaft of left humerus with routine healing, subsequent encounter  · Treatment Dx: Decreased mobility and strength with pain throughout L UE. Insurance/Certification information:  PT Insurance Information: Medical [place]  Physician Information:  Referring Practitioner: Benja Stein MD  Plan of care signed (Y/N): Signed on 21    Date of Patient follow up with Physician: 3/3/22     Progress Report: [x]  Yes  []  No       Date Range for reporting period:  Beginnin/11/22  Ending:      Progress report due (10 Rx/or 30 days whichever is less):       Recertification due (POC duration/ or 90 days whichever is less):     Visit # POC/Insurance Allowable Auth Needed   17 bomn []Yes    [x]No     Functional Outcomes Measure:   Date Assessed: at progress note (22)  Test: Q-DASH  Score: 36, disability score of 56%    Pain level:  3-4/10     History of Injury: Patient reports L UE pain secondary to a left proximal humerus fracture/dislocation.  She underwent open reduction internal fixation on 2021. Fracture occurred when she was walking her 10 month old puppy, her puppy got excited, she could not maintain her balance and fell and hit her left shoulder. She states the past week she has noticed more movement. In the morning she reports more stiffness, however the pendulum exercises have been very helpful.  She states she is out of her sling when at home and sitting/walking from room to room. Pt states she has been sleeping in a recliner and has a difficulty time sleeping/has not been able to sleep in her bed. Pt is R handed. Pt likes to latoya, walk, and play with her dog. SUBJECTIVE:    1/31/22: Pt reports, \"I feel like I am getting more movement each day. \" She reports some discomfort after last PT visit, but some Aleve helped mitigate the discomfort. 02/02/22: Patient reports  shoulder got sore last couple days had to take pain pill. States today is not as bad.  2/9/22: Pt reports her shoulder is doing okay. She states her doctor put her on some naproxen for swelling management. 2/11/22: Pt reports, \"My shoulder/arm has been feeling good the past couple days. \" She states she has been happy with her progress and that she has been able to do some more baking and household chores. 02/14/22: Patient reports shoulder is improving,just soreness in the biceps area. States she was able to run the sweeper with left ue.  2/16/22: Pt reports her shoulder is feeling good. She continues to report tightness along the back of her arm (triceps region) and along her incision. 2/18/22: Pt reports her shoulder is feeling good. She continues to report feelings of tightness, but that tightness gets better with some massage. 02/21/22: Patient reports shoulder shoulder is improving overall,some stiffness in am.  02/23/22: Patient reports some soreness posterior left shoulder after she did more cooking and dishes yesterday. 2/28/22: Pt reports some stiffness, but she states she is able to work it out. She reports most of her discomfort is along the middle of her incision. 03/02/22: Patient reports she is sleeping better at night,able to raise her arm a little higher,still some stiffness.     OBJECTIVE:    Observation:    Test measurements:      ROM Left AROM Left PROM Right AROM   Shoulder Flex 90 deg, tight 175 deg WNL   Shoulder Abd 80 deg, pain 160 deg WNL   Shoulder ER @ 45 deg - 50 deg WNL   Shoulder IR @ 45 deg - 60 deg WNL                     Strength   Left Right   Shoulder Flex  3+/5 4/5   Shoulder Scap  3+/5 4/5   Shoulder ER  3+/5 4/5   Shoulder IR  3+/5 4/5                 RESTRICTIONS/PRECAUTIONS: L proximal humeral fracture/dislocation s/p ORIF on 11/23/21. Lifting restrictions no greater than 2#. Exercises/Interventions:   Therapeutic Ex (29276)  Min: 25' Resistance/Reps Cues/Notes   Pendulums    Towel Slides -Flex incline    Scap Retraction 3x10 - flex/ext, side to side, CW/CCW  2x10                  HEP   AAROM with cane     ER     Flex      HEP   OH pulleys Flex, 2 min    Single arm row/ row 2 #  2x10     Sidelying ER 2x10    T-Slide     Rows     Ext Yellow  2x10  2x10    Wall Slides x10   Supine chest press  HOLD   Scaption 0-90 0 # 2 x 10 Less shoulder hike   Bicep Curls HOLD    Manual Intervention  (30325)  Min: 20'     L Elbow Flex/Ext PROM 2'    L Shoulder PROM In all directions to pt tolerance  12'    STM/scar tissue massage  L incision scar tissue massage + RTC musculature    NMR re-education (04312)  Min:               Therapeutic Activity (48599)  Min:               Modalities:     Ice Pack  L Shoulder          Other Therapeutic Activities:  Pt was educated on PT POC, Diagnosis, Prognosis, pathomechanics as well as frequency and duration of scheduling future physical therapy appointments. Time was also taken on this day to answer all patient questions and participation in PT. Reviewed appointment policy in detail with patient and patient verbalized understanding. Home Exercise Program:  Pendulums  Towel Slides     Flex     Scaption  Scap Retraction    Therapeutic Exercise and NMR EXR  [x] (60804) Provided verbal/tactile cueing for activities related to strengthening, flexibility, endurance, ROM  for improvements in scapular, scapulothoracic and UE control with self care, reaching, carrying, lifting, house/yardwork, driving/computer work. [] (62010) Provided verbal/tactile cueing for activities related to improving balance, coordination, kinesthetic sense, posture, motor skill, proprioception  to assist with  scapular, scapulothoracic and UE control with self care, reaching, carrying, lifting, house/yardwork, driving/computer work. Therapeutic Activities:    [] (40725 or 35904) Provided verbal/tactile cueing for activities related to improving balance, coordination, kinesthetic sense, posture, motor skill, proprioception and motor activation to allow for proper function of scapular, scapulothoracic and UE control with self care, carrying, lifting, driving/computer work.      Home Exercise Program:    [x] (20578) Reviewed/Progressed HEP activities related to strengthening, flexibility, endurance, ROM of scapular, scapulothoracic and UE control with self care, reaching, carrying, lifting, house/yardwork, driving/computer work  [] (74224) Reviewed/Progressed HEP activities related to improving balance, coordination, kinesthetic sense, posture, motor skill, proprioception of scapular, scapulothoracic and UE control with self care, reaching, carrying, lifting, house/yardwork, driving/computer work      Manual Treatments:  PROM / STM / Oscillations-Mobs:  G-I, II, III, IV (PA's, Inf., Post.)  [x] (31285) Provided manual therapy to mobilize soft tissue/joints of cervical/CT, scapular GHJ and UE for the purpose of modulating pain, promoting relaxation,  increasing ROM, reducing/eliminating soft tissue swelling/inflammation/restriction, improving soft tissue extensibility and allowing for proper ROM for normal function with self care, reaching, carrying, lifting, house/yardwork, driving/computer work    Charges:  Timed Code Treatment Minutes: 45   Total Treatment Minutes: 45       [] EVAL (LOW) 31553 (typically 20 minutes face-to-face)  [] EVAL (MOD) 30565 (typically 30 minutes face-to-face)  [] EVAL (HIGH) 45229 (typically 45 minutes face-to-face)  [] RE-EVAL     [x] (54176) x  2   [] Dry needle 1 or 2 Muscles (92713)  [] NMR (62459) x     [] Dry needle 3+ Muscles (26463)  [x] Manual (10404) x 1     [] Ultrasound (03666) x  [] TA (71944) x     [] Mech Traction (47474)  [] ES(attended) (54015)     [] ES (un) (50492):   [] Vasopump (73011) [] Ionto (89661)   [] Other:      GOALS:  Patient stated goal: \"To be able to get back to my normal life. \"  [x]? Progressing: []? Met: []? Not Met: []? Adjusted     Therapist goals for Patient:   Short Term Goals: To be achieved in: 4 weeks  1. Independent in HEP and progression per patient tolerance, in order to prevent re-injury. [x]? Progressing: []? Met: []? Not Met: []? Adjusted  2. Patient will have a decrease in pain to facilitate improvement in movement, function, and ADLs as indicated by Functional Deficits. [x]? Progressing: []? Met: []? Not Met: []? Adjusted     Long Term Goals: To be achieved in: 8 weeks  1. Disability index score of 70% or less for the Quick DASH to assist with reaching prior level of function. []? Progressing: [x]? Met: []? Not Met: []? Adjusted  2. Patient will demonstrate increased L shoulder flex/abd AROM to 140 deg to allow for proper joint functioning as indicated by Functional Deficits. [x]? Progressing: []? Met: []? Not Met: []? Adjusted  3. Patient will demonstrate an increase in NM recruitment/activation and overall GH and scapular strength to 4/5 or WNL for proper functional mobility as indicated by patients Functional Deficits. [x]? Progressing: []? Met: []? Not Met: []? Adjusted  4. Patient will return to household chores without increased symptoms or restriction. [x]? Progressing: []? Met: []? Not Met: []? Adjusted  5. Patient will return to walking her dog 1 mile without increased pain or restriction. []? Progressing: []? Met: [x]? Not Met: []? Adjusted    ASSESSMENT:  Pt toletated treatment well. Cont to have mild shoulder hike with AROM.  Decreased tightness in scar area. No adverse reaction to today's treatment. Treatment/Activity Tolerance:  [x] Patient tolerated treatment well [] Patient limited by fatique  [] Patient limited by pain  [] Patient limited by other medical complications   [] Other:     Overall Progression Towards Functional goals/ Treatment Progress Update:  [x] Patient is progressing as expected towards functional goals listed. [x] Progression is slowed due to complexities/Impairments listed. [] Progression has been slowed due to co-morbidities. [] Plan just implemented, too soon to assess goals progression <30days   [] Goals require adjustment due to lack of progress  [] Patient is not progressing as expected and requires additional follow up with physician  [] Other    Prognosis for POC: [x] Good [] Fair  [] Poor    Patient requires continued skilled intervention: [x] Yes  [] No      PLAN: See eval  [x] Continue per plan of care [] Alter current plan (see comments)  [] Plan of care initiated [] Hold pending MD visit [] Discharge    Electronically signed by: Claudia Beauchamp PTA     Note: If patient does not return for scheduled/recommended follow up visits, this note will serve as a discharge from care along with the most recent update on progress.

## 2022-03-03 ENCOUNTER — OFFICE VISIT (OUTPATIENT)
Dept: ORTHOPEDIC SURGERY | Age: 62
End: 2022-03-03
Payer: COMMERCIAL

## 2022-03-03 VITALS — HEIGHT: 61 IN | RESPIRATION RATE: 18 BRPM | WEIGHT: 243 LBS | BODY MASS INDEX: 45.88 KG/M2

## 2022-03-03 DIAGNOSIS — S42.352D CLOSED DISPLACED COMMINUTED FRACTURE OF SHAFT OF LEFT HUMERUS WITH ROUTINE HEALING, SUBSEQUENT ENCOUNTER: Primary | ICD-10-CM

## 2022-03-03 DIAGNOSIS — Z98.890 HISTORY OF OPEN REDUCTION AND INTERNAL FIXATION (ORIF) PROCEDURE: ICD-10-CM

## 2022-03-03 PROCEDURE — 4004F PT TOBACCO SCREEN RCVD TLK: CPT | Performed by: PHYSICIAN ASSISTANT

## 2022-03-03 PROCEDURE — 99213 OFFICE O/P EST LOW 20 MIN: CPT | Performed by: PHYSICIAN ASSISTANT

## 2022-03-03 PROCEDURE — 3017F COLORECTAL CA SCREEN DOC REV: CPT | Performed by: PHYSICIAN ASSISTANT

## 2022-03-03 PROCEDURE — G8417 CALC BMI ABV UP PARAM F/U: HCPCS | Performed by: PHYSICIAN ASSISTANT

## 2022-03-03 PROCEDURE — G8427 DOCREV CUR MEDS BY ELIG CLIN: HCPCS | Performed by: PHYSICIAN ASSISTANT

## 2022-03-03 PROCEDURE — G8484 FLU IMMUNIZE NO ADMIN: HCPCS | Performed by: PHYSICIAN ASSISTANT

## 2022-03-03 NOTE — PROGRESS NOTES
This dictation was done with [employer] dictation and may contain mechanical errors related to translation. Resp. rate 18, height 5' 1\" (1.549 m), weight 243 lb (110.2 kg). This is a very pleasant 70-year-old female who is here after an ORIF of her left proximal humerus from 11/20/2021. She is 3 and half months out and is able to actively include 170 degrees of forward flexion 100 degrees of abduction she has weakness but she is making good progress especially lately. Her pain score still can be a PI 10 at worst.  Neurologically she is intact in her left hand and she was sent for x-rays including an AP transaxillary view and Y-view of her left shoulder. X-rays taken at the office today show good fixation of the plate and gradual consolidation and bony callus healing in of the fracture in the proximal humerus. This was shown to the patient. There is no superior migration of the humeral head. Stable status post healing of the proximal humerus fracture.     At this point I think she still has some rotator cuff tendinitis and soreness is going to continue doing therapy for that I gave her a band to progress to home exercise program from physical therapy and she will follow-up with us in the next 3 to 4 months

## 2022-03-04 ENCOUNTER — HOSPITAL ENCOUNTER (OUTPATIENT)
Dept: PHYSICAL THERAPY | Age: 62
Setting detail: THERAPIES SERIES
Discharge: HOME | End: 2022-03-04
Payer: COMMERCIAL

## 2022-03-04 NOTE — FLOWSHEET NOTE
[address] [facility] [place]. [address]  Phone: [phone]   Fax:     [phone]    Physical Therapy  Cancellation/No-show Note  Patient Name:  Latasha Berrios  :  1960   Date:  3/4/2022  Cancelled visits to date: 3  No-shows to date: 0    Patient status for today's appointment patient:  [x]  Cancelled  []  Rescheduled appointment   []  No-show     Reason given by patient:  []  Patient ill  []  Conflicting appointment  []  No transportation    []  Conflict with work  []  No reason given  []  Other:     Comments:     Phone call information:   []  Phone call made today to patient at _ time at number provided:      []  Patient answered, conversation as follows:    []  Patient did not answer, message left as follows:  [x]  Phone call not made today. - Pt called the clinic to cancel her appt.      Electronically signed by:  Jorge Navarro PT

## 2022-03-08 ENCOUNTER — HOSPITAL ENCOUNTER (OUTPATIENT)
Dept: PHYSICAL THERAPY | Age: 62
Setting detail: THERAPIES SERIES
Discharge: HOME | End: 2022-03-08
Payer: COMMERCIAL

## 2022-03-08 PROCEDURE — 97110 THERAPEUTIC EXERCISES: CPT

## 2022-03-08 PROCEDURE — 97140 MANUAL THERAPY 1/> REGIONS: CPT

## 2022-03-08 NOTE — DISCHARGE SUMMARY
[facility]. Benjy Zaragoza [facility] [address]  Phone: [phone]   Fax:     [phone]         [facility] Diamond. Benjy Zaragoza [facility] [address]  Phone: [phone]   Fax:     [phone]    Physical Therapy Discharge Summary    Dear Dr. Xiomara Romero MD    We had the pleasure of treating the following patient for physical therapy services at [address] [place] [address]. A summary of our findings can be found in the discharge summary below. This includes our final assessment. If you have any questions or concerns regarding these findings, please do not hesitate to contact me at the office phone number checked above. Thank you for the referral.       Physician Signature:________________________________Date:__________________  By signing above, therapists plan is approved by physician        Patient: Kristina York   : 1960   MRN: [id number]  Referring Physician: Dr. Xiomara Romero MD      Evaluation Date: 6262    Insurance/Certification information:  [employer] [place]    Date Range of Treatment: 21   Total visits: 3/8/22        Pain Scale: 0/10     Response to Treatment:   [x]Patient met all goals and has responded well to treatment and will continue HEP   []Patient should continue to improve in reasonable time if they continue HEP   []Patient has plateaued and is no longer responding to skilled PT intervention    []Patient is getting worse and would benefit from return to referring MD   []Patient unable to adhere to initial POC        PLAN: Pt received demonstration, verbal and written instruction in independent HEP. Pt will need to maintain their HEP in order to prevent re-occurrence.       Reason for Discharge:  [x] Goals Met   [] Patient did not return after initial evaluation   [] Progress Plateaued  [] Missed _____ scheduled appointments   [] No insurance coverage [] Patient terminated therapy   [] MD discharged from PT [] Financial Limitations  [] Other:      Electronically signed by:  Erica Mendoza PT, PT    Physical Therapy Treatment Note/ Progress Report:       Date:  3/8/2022    Patient Name:  Eneida Ruiz    :  1960  MRN: [id number]    Pertinent Medical History:Additional Pertinent Hx: No PMH on file. Medical/Treatment Diagnosis Information:  · Diagnosis: S42.352D (ICD-10-CM) - Closed displaced comminuted fracture of shaft of left humerus with routine healing, subsequent encounter  · Treatment Dx: Decreased mobility and strength with pain throughout L UE. Insurance/Certification information:  PT Insurance Information: [employer] [place]  Physician Information:  Referring Practitioner: Humble Prado MD  Plan of care signed (Y/N): Signed on 21    Date of Patient follow up with Physician: 3/3/22     Progress Report: [x]  Yes  []  No       Date Range for reporting period:  Beginnin/11/22  Ending:      Progress report due (10 Rx/or 30 days whichever is less):   51    Recertification due (POC duration/ or 90 days whichever is less):     Visit # POC/Insurance Allowable Auth Needed   18 bomn []Yes    [x]No     Functional Outcomes Measure:   Date Assessed: at progress note (22)  Test: Q-DASH  Score: 36, disability score of 56%    Pain level:  0/10     History of Injury: Patient reports L UE pain secondary to a left proximal humerus fracture/dislocation.  She underwent open reduction internal fixation on 2021. Fracture occurred when she was walking her 10 month old puppy, her puppy got excited, she could not maintain her balance and fell and hit her left shoulder. She states the past week she has noticed more movement. In the morning she reports more stiffness, however the pendulum exercises have been very helpful.  She states she is out of her sling when at home and sitting/walking from room to room. Pt states she has been sleeping in a recliner and has a difficulty time sleeping/has not been able to sleep in her bed. Pt is R handed. Pt likes to latoya, walk, and play with her dog. SUBJECTIVE:    1/31/22: Pt reports, \"I feel like I am getting more movement each day. \" She reports some discomfort after last PT visit, but some Aleve helped mitigate the discomfort. 02/02/22: Patient reports  shoulder got sore last couple days had to take pain pill. States today is not as bad.  2/9/22: Pt reports her shoulder is doing okay. She states her doctor put her on some naproxen for swelling management. 2/11/22: Pt reports, \"My shoulder/arm has been feeling good the past couple days. \" She states she has been happy with her progress and that she has been able to do some more baking and household chores. 02/14/22: Patient reports shoulder is improving,just soreness in the biceps area. States she was able to run the sweeper with left ue.  2/16/22: Pt reports her shoulder is feeling good. She continues to report tightness along the back of her arm (triceps region) and along her incision. 2/18/22: Pt reports her shoulder is feeling good. She continues to report feelings of tightness, but that tightness gets better with some massage. 02/21/22: Patient reports shoulder shoulder is improving overall,some stiffness in am.  02/23/22: Patient reports some soreness posterior left shoulder after she did more cooking and dishes yesterday. 2/28/22: Pt reports some stiffness, but she states she is able to work it out. She reports most of her discomfort is along the middle of her incision. 03/02/22: Patient reports she is sleeping better at night,able to raise her arm a little higher,still some stiffness. 3/8/22: Pt reports she saw her physician. He is happy with the progress. Today will be the patients last appointment with an independent HEP.      OBJECTIVE:    Observation:    Test measurements:      ROM Left AROM Left PROM Right AROM   Shoulder Flex 90 deg, tight 175 deg WNL   Shoulder Abd 80 deg, pain 160 deg WNL   Shoulder ER @ 45 deg - 50 deg WNL   Shoulder IR @ 45 deg - 60 deg WNL                     Strength   Left Right   Shoulder Flex  4-/5 4/5   Shoulder Scap  4-/5 4/5   Shoulder ER  4-/5 4/5   Shoulder IR  4-/5 4/5                 RESTRICTIONS/PRECAUTIONS: L proximal humeral fracture/dislocation s/p ORIF on 11/23/21. Lifting restrictions no greater than 2#. Exercises/Interventions:   Therapeutic Ex (12105)  Min: 25' Resistance/Reps Cues/Notes   Pendulums    Towel Slides -Flex incline    Scap Retraction 3x10 - flex/ext, side to side, CW/CCW  2x10                  HEP   AAROM with cane     ER     Flex      HEP   OH pulleys Flex, 2 min    Single arm row/ row 2#,  2x10     Sidelying ER 2x10    T-Slide     Rows     Ext     ER/IR Yellow  2x10  2x10  2x10 each    Wall Slides x10   Supine chest press  HOLD   Scaption 0-90 0 # 2 x 10 Less shoulder hike   Bicep Curls HOLD    Manual Intervention  (14544)  Min: 20'     L Elbow Flex/Ext PROM 2'    L Shoulder PROM In all directions to pt tolerance  12'    STM/scar tissue massage  L incision scar tissue massage + RTC musculature    NMR re-education (91441)  Min:               Therapeutic Activity (24909)  Min:               Modalities:     Ice Pack  L Shoulder          Other Therapeutic Activities:  Pt was educated on PT POC, Diagnosis, Prognosis, pathomechanics as well as frequency and duration of scheduling future physical therapy appointments. Time was also taken on this day to answer all patient questions and participation in PT. Reviewed appointment policy in detail with patient and patient verbalized understanding. Home Exercise Program:  Access Code: [email or web address]  URL: [email or web address]  Date: 03/08/2022  Prepared by: Christie Del Cid    Exercises  Standing Row with Anchored Resistance - 1 x daily - 7 x weekly - 2 sets - 10 reps  Shoulder Extension with Resistance - 1 x daily - 7 x weekly - 2 sets - 10 reps  Shoulder External Rotation with Anchored Resistance - 1 x daily - 7 x weekly - 2 sets - 10 reps  Shoulder Internal Rotation with Resistance - 1 x daily - 7 x weekly - 2 sets - 10 reps  Standing Shoulder Scaption - 1 x daily - 7 x weekly - 2 sets - 10 reps  Shoulder Flexion Wall Slide with Towel - 1 x daily - 7 x weekly - 2 sets - 10 reps  Sidelying Shoulder External Rotation - 1 x daily - 7 x weekly - 2 sets - 10 reps  Standing Bent Over Single Arm Scapular Row with Table Support - 1 x daily - 7 x weekly - 2 sets - 10 reps    Therapeutic Exercise and NMR EXR  [x] (38043) Provided verbal/tactile cueing for activities related to strengthening, flexibility, endurance, ROM  for improvements in scapular, scapulothoracic and UE control with self care, reaching, carrying, lifting, house/yardwork, driving/computer work.    [] (04066) Provided verbal/tactile cueing for activities related to improving balance, coordination, kinesthetic sense, posture, motor skill, proprioception  to assist with  scapular, scapulothoracic and UE control with self care, reaching, carrying, lifting, house/yardwork, driving/computer work. Therapeutic Activities:    [] (61346 or 83416) Provided verbal/tactile cueing for activities related to improving balance, coordination, kinesthetic sense, posture, motor skill, proprioception and motor activation to allow for proper function of scapular, scapulothoracic and UE control with self care, carrying, lifting, driving/computer work.      Home Exercise Program:    [x] (70364) Reviewed/Progressed HEP activities related to strengthening, flexibility, endurance, ROM of scapular, scapulothoracic and UE control with self care, reaching, carrying, lifting, house/yardwork, driving/computer work  [] (84633) Reviewed/Progressed HEP activities related to improving balance, coordination, kinesthetic sense, posture, motor skill, proprioception of scapular, scapulothoracic and UE control with self care, reaching, carrying, lifting, house/yardwork, driving/computer work      Manual Treatments:  PROM / STM / Oscillations-Mobs:  G-I, II, III, IV (PA's, Inf., Post.)  [x] (42935) Provided manual therapy to mobilize soft tissue/joints of cervical/CT, scapular GHJ and UE for the purpose of modulating pain, promoting relaxation,  increasing ROM, reducing/eliminating soft tissue swelling/inflammation/restriction, improving soft tissue extensibility and allowing for proper ROM for normal function with self care, reaching, carrying, lifting, house/yardwork, driving/computer work    Charges:  Timed Code Treatment Minutes: 45   Total Treatment Minutes: 45       [] EVAL (LOW) 20729 (typically 20 minutes face-to-face)  [] EVAL (MOD) 47670 (typically 30 minutes face-to-face)  [] EVAL (HIGH) 34553 (typically 45 minutes face-to-face)  [] RE-EVAL     [x] OF(82570) x  2   [] Dry needle 1 or 2 Muscles (66571)  [] NMR (54592) x     [] Dry needle 3+ Muscles (35259)  [x] Manual (42707) x 1     [] Ultrasound (01386) x  [] TA (92731) x     [] Mech Traction (81690)  [] ES(attended) (59924)     [] ES (un) (92893):   [] Vasopump (79141) [] Ionto (01217)   [] Other:      GOALS:  Patient stated goal: \"To be able to get back to my normal life. \"  []? Progressing: [x]? Met: []? Not Met: []? Adjusted     Therapist goals for Patient:   Short Term Goals: To be achieved in: 4 weeks  1. Independent in HEP and progression per patient tolerance, in order to prevent re-injury. []? Progressing: [x]? Met: []? Not Met: []? Adjusted  2. Patient will have a decrease in pain to facilitate improvement in movement, function, and ADLs as indicated by Functional Deficits. []? Progressing: [x]? Met: []? Not Met: []? Adjusted     Long Term Goals: To be achieved in: 8 weeks  1. Disability index score of 70% or less for the Quick DASH to assist with reaching prior level of function. []? Progressing: [x]? Met: []? Not Met: []? Adjusted  2. Patient will demonstrate increased L shoulder flex/abd AROM to 140 deg to allow for proper joint functioning as indicated by Functional Deficits. []? Progressing: [x]? Met: []? Not Met: []? Adjusted  3. Patient will demonstrate an increase in NM recruitment/activation and overall GH and scapular strength to 4/5 or WNL for proper functional mobility as indicated by patients Functional Deficits. []? Progressing: []? Met: []? Not Met: []? Adjusted  4. Patient will return to household chores without increased symptoms or restriction. []? Progressing: [x]? Met: []? Not Met: []? Adjusted  5. Patient will return to walking her dog 1 mile without increased pain or restriction. []? Progressing: [x]? Met: []? Not Met: []? Adjusted    ASSESSMENT:  Pt demonstrates significant improvements in ROM and strength since beginning PT. Pt received demonstration, verbal and written instructions on an independent HEP. Pt has completed PT at this time and will be discharged. Treatment/Activity Tolerance:  [x] Patient tolerated treatment well [] Patient limited by fatique  [] Patient limited by pain  [] Patient limited by other medical complications   [] Other:     Overall Progression Towards Functional goals/ Treatment Progress Update:  [x] Patient is progressing as expected towards functional goals listed. [] Progression is slowed due to complexities/Impairments listed. [] Progression has been slowed due to co-morbidities.   [] Plan just implemented, too soon to assess goals progression <30days   [] Goals require adjustment due to lack of progress  [] Patient is not progressing as expected and requires additional follow up with physician  [] Other    Prognosis for POC: [x] Good [] Fair  [] Poor    Patient requires continued skilled intervention: [] Yes  [x] No      PLAN: See eval  [] Continue per plan of care [] Alter current plan (see comments)  [] Plan of care initiated [] Hold pending MD visit [x] Discharge    Electronically signed by: Mimi Luciano PT     Note: If patient does not return for scheduled/recommended follow up visits, this note will serve as a discharge from care along with the most recent update on progress.

## 2022-03-10 ENCOUNTER — APPOINTMENT (OUTPATIENT)
Dept: PHYSICAL THERAPY | Age: 62
End: 2022-03-10
Payer: COMMERCIAL

## 2022-03-15 ENCOUNTER — APPOINTMENT (OUTPATIENT)
Dept: PHYSICAL THERAPY | Age: 62
End: 2022-03-15
Payer: COMMERCIAL

## 2022-03-17 ENCOUNTER — APPOINTMENT (OUTPATIENT)
Dept: PHYSICAL THERAPY | Age: 62
End: 2022-03-17
Payer: COMMERCIAL

## 2022-03-21 ENCOUNTER — APPOINTMENT (OUTPATIENT)
Dept: PHYSICAL THERAPY | Age: 62
End: 2022-03-21
Payer: COMMERCIAL

## 2022-03-24 ENCOUNTER — APPOINTMENT (OUTPATIENT)
Dept: PHYSICAL THERAPY | Age: 62
End: 2022-03-24
Payer: COMMERCIAL

## 2022-03-25 ENCOUNTER — TELEPHONE (OUTPATIENT)
Dept: ORTHOPEDIC SURGERY | Age: 62
End: 2022-03-25

## 2022-03-25 DIAGNOSIS — Z98.890 HISTORY OF OPEN REDUCTION AND INTERNAL FIXATION (ORIF) PROCEDURE: ICD-10-CM

## 2022-03-25 DIAGNOSIS — S42.352D CLOSED DISPLACED COMMINUTED FRACTURE OF SHAFT OF LEFT HUMERUS WITH ROUTINE HEALING, SUBSEQUENT ENCOUNTER: Primary | ICD-10-CM

## 2022-03-25 RX ORDER — NAPROXEN 500 MG/1
500 TABLET ORAL 2 TIMES DAILY WITH MEALS
Qty: 60 TABLET | Refills: 0 | Status: SHIPPED | OUTPATIENT
Start: 2022-03-25 | End: 2022-04-24

## 2022-03-25 NOTE — TELEPHONE ENCOUNTER
Prescription Refill     Medication Name:  Berkley [place]   Patient Contact Number:  [phone]    [address]

## 2022-09-16 NOTE — TELEPHONE ENCOUNTER
Dr. Kiesha Black came into the office this afternoon for a few minutes. Ask him verbally about this call. He is okay sending Naproxen 500 mg BID. RX sent to pharmacy. Infectious Disease

## 2023-01-11 ENCOUNTER — APPOINTMENT (OUTPATIENT)
Dept: CT IMAGING | Age: 63
End: 2023-01-11

## 2023-01-11 ENCOUNTER — HOSPITAL ENCOUNTER (EMERGENCY)
Age: 63
Discharge: HOME | End: 2023-01-11
Attending: EMERGENCY MEDICINE

## 2023-01-11 VITALS
DIASTOLIC BLOOD PRESSURE: 67 MMHG | WEIGHT: 236.5 LBS | HEIGHT: 61 IN | BODY MASS INDEX: 44.65 KG/M2 | SYSTOLIC BLOOD PRESSURE: 128 MMHG | RESPIRATION RATE: 12 BRPM | TEMPERATURE: 97.9 F | OXYGEN SATURATION: 96 % | HEART RATE: 86 BPM

## 2023-01-11 DIAGNOSIS — K04.7 DENTAL ABSCESS: ICD-10-CM

## 2023-01-11 DIAGNOSIS — K02.9 DENTAL CARIES: Primary | ICD-10-CM

## 2023-01-11 LAB
A/G RATIO: 1.3 (ref 1.1–2.2)
ALBUMIN SERPL-MCNC: 3.9 G/DL (ref 3.4–5)
ALP BLD-CCNC: 85 U/L (ref 40–129)
ALT SERPL-CCNC: 8 U/L (ref 10–40)
ANION GAP SERPL CALCULATED.3IONS-SCNC: 10 MMOL/L (ref 3–16)
AST SERPL-CCNC: 12 U/L (ref 15–37)
BASOPHILS ABSOLUTE: 0.2 K/UL (ref 0–0.2)
BASOPHILS RELATIVE PERCENT: 1.1 %
BILIRUB SERPL-MCNC: 0.3 MG/DL (ref 0–1)
BUN BLDV-MCNC: 8 MG/DL (ref 7–20)
CALCIUM SERPL-MCNC: 9.9 MG/DL (ref 8.3–10.6)
CHLORIDE BLD-SCNC: 94 MMOL/L (ref 99–110)
CO2: 30 MMOL/L (ref 21–32)
CREAT SERPL-MCNC: 0.6 MG/DL (ref 0.6–1.2)
EOSINOPHILS ABSOLUTE: 0.3 K/UL (ref 0–0.6)
EOSINOPHILS RELATIVE PERCENT: 1.9 %
GFR SERPL CREATININE-BSD FRML MDRD: >60 ML/MIN/{1.73_M2}
GLUCOSE BLD-MCNC: 220 MG/DL (ref 70–99)
HCT VFR BLD CALC: 49 % (ref 36–48)
HEMOGLOBIN: 16.4 G/DL (ref 12–16)
LACTIC ACID: 1.5 MMOL/L (ref 0.4–2)
LYMPHOCYTES ABSOLUTE: 3.2 K/UL (ref 1–5.1)
LYMPHOCYTES RELATIVE PERCENT: 18.4 %
MCH RBC QN AUTO: 30 PG (ref 26–34)
MCHC RBC AUTO-ENTMCNC: 33.4 G/DL (ref 31–36)
MCV RBC AUTO: 89.7 FL (ref 80–100)
MONOCYTES ABSOLUTE: 1.4 K/UL (ref 0–1.3)
MONOCYTES RELATIVE PERCENT: 7.8 %
NEUTROPHILS ABSOLUTE: 12.3 K/UL (ref 1.7–7.7)
NEUTROPHILS RELATIVE PERCENT: 70.8 %
PDW BLD-RTO: 13.5 % (ref 12.4–15.4)
PLATELET # BLD: 455 K/UL (ref 135–450)
PMV BLD AUTO: 7.9 FL (ref 5–10.5)
POTASSIUM REFLEX MAGNESIUM: 4.5 MMOL/L (ref 3.5–5.1)
RBC # BLD: 5.47 M/UL (ref 4–5.2)
SODIUM BLD-SCNC: 134 MMOL/L (ref 136–145)
TOTAL PROTEIN: 6.8 G/DL (ref 6.4–8.2)
WBC # BLD: 17.4 K/UL (ref 4–11)

## 2023-01-11 PROCEDURE — 6360000004 HC RX CONTRAST MEDICATION: Performed by: EMERGENCY MEDICINE

## 2023-01-11 PROCEDURE — 80053 COMPREHEN METABOLIC PANEL: CPT

## 2023-01-11 PROCEDURE — 70491 CT SOFT TISSUE NECK W/DYE: CPT

## 2023-01-11 PROCEDURE — 85025 COMPLETE CBC W/AUTO DIFF WBC: CPT

## 2023-01-11 PROCEDURE — 99285 EMERGENCY DEPT VISIT HI MDM: CPT

## 2023-01-11 PROCEDURE — 87040 BLOOD CULTURE FOR BACTERIA: CPT

## 2023-01-11 PROCEDURE — 2500000003 HC RX 250 WO HCPCS: Performed by: EMERGENCY MEDICINE

## 2023-01-11 PROCEDURE — 83605 ASSAY OF LACTIC ACID: CPT

## 2023-01-11 PROCEDURE — 96365 THER/PROPH/DIAG IV INF INIT: CPT

## 2023-01-11 RX ORDER — CLINDAMYCIN HYDROCHLORIDE 300 MG/1
300 CAPSULE ORAL 4 TIMES DAILY
Qty: 40 CAPSULE | Refills: 0 | Status: SHIPPED | OUTPATIENT
Start: 2023-01-11 | End: 2023-01-21

## 2023-01-11 RX ORDER — AMLODIPINE BESYLATE AND ATORVASTATIN CALCIUM 5; 40 MG/1; MG/1
1 TABLET, FILM COATED ORAL DAILY
COMMUNITY

## 2023-01-11 RX ORDER — CLINDAMYCIN PHOSPHATE 600 MG/50ML
600 INJECTION INTRAVENOUS ONCE
Status: COMPLETED | OUTPATIENT
Start: 2023-01-11 | End: 2023-01-11

## 2023-01-11 RX ORDER — ESCITALOPRAM OXALATE 5 MG/1
5 TABLET ORAL DAILY
COMMUNITY

## 2023-01-11 RX ORDER — CLINDAMYCIN HYDROCHLORIDE 300 MG/1
300 CAPSULE ORAL ONCE
Status: DISCONTINUED | OUTPATIENT
Start: 2023-01-11 | End: 2023-01-11

## 2023-01-11 RX ADMIN — IOPAMIDOL 75 ML: 755 INJECTION, SOLUTION INTRAVENOUS at 21:26

## 2023-01-11 RX ADMIN — CLINDAMYCIN PHOSPHATE 600 MG: 600 INJECTION, SOLUTION INTRAVENOUS at 21:41

## 2023-01-11 ASSESSMENT — PAIN DESCRIPTION - DESCRIPTORS: DESCRIPTORS: SHARP

## 2023-01-11 ASSESSMENT — PAIN DESCRIPTION - ORIENTATION: ORIENTATION: LEFT

## 2023-01-11 ASSESSMENT — PAIN SCALES - GENERAL: PAINLEVEL_OUTOF10: 4

## 2023-01-11 ASSESSMENT — PAIN DESCRIPTION - PAIN TYPE: TYPE: ACUTE PAIN

## 2023-01-11 ASSESSMENT — PAIN DESCRIPTION - FREQUENCY: FREQUENCY: INTERMITTENT

## 2023-01-11 ASSESSMENT — PAIN DESCRIPTION - LOCATION: LOCATION: JAW

## 2023-01-11 ASSESSMENT — PAIN - FUNCTIONAL ASSESSMENT: PAIN_FUNCTIONAL_ASSESSMENT: 0-10

## 2023-01-12 NOTE — ED PROVIDER NOTES
[address]  eMERGENCY dEPARTMENT eNCOUnter      Pt Name: Art Lange  MRN: [id number]  [place] 1960  Date of evaluation: 1/11/2023  Provider: Ev Mcneill MD  PCP: Naomy Ovalle RN      CHIEF COMPLAINT       Chief Complaint   Patient presents with    Dental Pain     On atbs, increased swelling left side of jaw/face to chin       HISTORY OFPRESENT ILLNESS   (Location/Symptom, Timing/Onset, Context/Setting, Quality, Duration, Modifying Factors,Severity)  Note limiting factors. Art Lange is a 58 y.o. female presents with complaints of tooth pain on the left side of her face she says she has been on amoxicillin for about a week after she had a tooth pain to the left upper part of the jaw she says that since that time she feels like there is more swelling in her face she has no change in her voice some mild difficulty swallowing she is already made an appointment with her dentist to have her tooth removed but felt that she needed to be seen tonight no difficulty breathing    Nursing Notes were all reviewed and agreed with or any disagreements were addressed  in the HPI. REVIEW OF SYSTEMS    (2-9 systems for level 4, 10 or more for level 5)     Review of Systems    Positives and Pertinent negatives as per HPI. Except as noted above in the ROS, all other systems were reviewed andnegative. PASTMEDICAL HISTORY   History reviewed. No pertinent past medical history. SURGICAL HISTORY       Past Surgical History:   Procedure Laterality Date    HUMERUS FRACTURE SURGERY Left 11/20/2021    OPEN REDUCTION INTERNAL FIXATION LEFT PROXIMAL HUMERUS performed by Ravi Roman MD at . [address]       Previous Medications    AMLODIPINE-ATORVASTATATIN (CADUET) 5-40 MG PER TABLET    Take 1 tablet by mouth daily    ASPIRIN EC 81 MG EC TABLET    Take 1 tablet by mouth 2 times daily Take for DVT blood clot prophylaxis.  Please avoid missing doses.    CHOLECALCIFEROL (VITAMIN D3) 125 MCG (5000 UT) TABS    Take by mouth    ESCITALOPRAM (LEXAPRO) 5 MG TABLET    Take 5 mg by mouth daily    LEVOTHYROXINE (SYNTHROID) 150 MCG TABLET    Take 150 mcg by mouth every other day Alternates with 175 mcg    LEVOTHYROXINE (SYNTHROID) 175 MCG TABLET    Take 175 mcg by mouth every other day Alternates with 150 mcg    ONDANSETRON (ZOFRAN-ODT) 4 MG DISINTEGRATING TABLET    Take 2 tablets by mouth every 8 hours as needed for Nausea or Vomiting    ROSUVASTATIN CALCIUM 20 MG CPSP    Take by mouth    SITAGLIPTIN (JANUVIA) 100 MG TABLET    Take 100 mg by mouth daily       ALLERGIES     Cyclosporine    FAMILY HISTORY     History reviewed. No pertinent family history. SOCIAL HISTORY       Social History     Socioeconomic History    Marital status:      Spouse name: None    Number of children: None    Years of education: None    Highest education level: None   Tobacco Use    Smoking status: Every Day    Smokeless tobacco: Never   Substance and Sexual Activity    Alcohol use: Yes     Comment: occasionally     Drug use: Never       SCREENINGS      @FLOW([id number])@      PHYSICAL EXAM    (up to 7 for level 4, 8 or more for level 5)     ED Triage Vitals [01/11/23 2005]   BP Temp Temp Source Heart Rate Resp SpO2 Height Weight   128/67 97.9 °F (36.6 °C) Oral 86 12 96 % 5' 1\" (1.549 m) 236 lb 8 oz (107.3 kg)       Physical Exam      General Appearance:  Alert, cooperative, no distress, appears stated age. Head:  Normocephalic, without obviousabnormality, atraumatic. Eyes:  conjunctiva/corneas clear, EOM's intact. Sclera anicteric. ENT: Mucous membranes moist.   Neck: Supple, symmetrical, trachea midline, no adenopathy. No jugular venous distention. Lungs:   Clear to auscultation bilaterally, respirationsunlabored. No rales, rhonchi or wheezes. Chest Wall:  No tenderness. Heart:  Regular rate and rhythm, S1 and S2 normal, no murmur, rub or gallop.    Abdomen: Soft, non-tender, bowel sounds active,   no masses, no organomegaly. Extremities: No edema, cords or calf tenderness. Full range of motion. Pulses: 2+ and symmetric   Skin: Turgor is normal, no rashes or lesions. Neurologic: Alert and oriented X 3. No focal findings. Motor grossly normal.  Speech clear, no drift, CN III-XII grossly intact,        DIAGNOSTIC RESULTS   LABS:    Labs Reviewed   CBC WITH AUTO DIFFERENTIAL - Abnormal; Notable for the following components:       Result Value    WBC 17.4 (*)     RBC 5.47 (*)     Hemoglobin 16.4 (*)     Hematocrit 49.0 (*)     Platelets 026 (*)     Neutrophils Absolute 12.3 (*)     Monocytes Absolute 1.4 (*)     All other components within normal limits   COMPREHENSIVE METABOLIC PANEL W/ REFLEX TO MG FOR LOW K - Abnormal; Notable for the following components:    Sodium 134 (*)     Chloride 94 (*)     Glucose 220 (*)     ALT 8 (*)     AST 12 (*)     All other components within normal limits   CULTURE, BLOOD 1   CULTURE, BLOOD 2   LACTIC ACID       All other labs were within normal range or not returned as of this dictation. EKG: All EKG's are interpreted by the Emergency Department Physician who eithersigns or Co-signs this chart in the absence of a cardiologist.        RADIOLOGY:   Non-plain film images such as CT, Ultrasound and MRI are read by the radiologist. Plain radiographic images are visualized by myself. *    Interpretation per the Radiologist below, if available at the time of this note:    CT SOFT TISSUE NECK W CONTRAST   Final Result      1.3 x 0.4 cm left-sided odontogenic abscess with extensive adjacent edema and reactive left submandibular lymphadenopathy. This is associated with periapical lucency of these left second mandibular molar tooth.             PROCEDURES   Unless otherwise noted below, none     Procedures    *    CRITICAL CARE TIME   N/A      EMERGENCY DEPARTMENT COURSE and DIFFERENTIALDIAGNOSIS/MDM:   Vitals:    Vitals:    01/11/23 2005   BP: 128/67   Pulse: 86   Resp: 12   Temp: 97.9 °F (36.6 °C)   TempSrc: Oral   SpO2: 96%   Weight: 236 lb 8 oz (107.3 kg)   Height: 5' 1\" (1.549 m)       Patient was given thefollowing medications:  Medications   clindamycin (CLEOCIN) 600 mg in dextrose 5 % 50 mL IVPB (600 mg IntraVENous New Bag 1/11/23 2141)   iopamidol (ISOVUE-370) 76 % injection 75 mL (75 mLs IntraVENous Given 1/11/23 2126)           The patient tolerated their visit well. The patient and / or the familywere informed of the results of any tests, a time was given to answer questions. FINAL IMPRESSION      1. Dental caries    2.  Dental abscess        Discussed the case with the radiologist the patient has a dental abscess with no signs of Ludewig's angina plan will be for changing her antibiotics to clindamycin and having her follow-up with her dentist  DISPOSITION/PLAN   DISPOSITION Decision To Discharge 01/11/2023 10:09:39 PM      PATIENT REFERRED TO:  your dentist    In 1 day      DISCHARGE MEDICATIONS:  New Prescriptions    CLINDAMYCIN (CLEOCIN) 300 MG CAPSULE    Take 1 capsule by mouth 4 times daily for 10 days       DISCONTINUED MEDICATIONS:  Discontinued Medications    LISINOPRIL-HYDROCHLOROTHIAZIDE (PRINZIDE;ZESTORETIC) 20-12.5 MG PER TABLET    Take 1 tablet by mouth daily    NAPROXEN (NAPROSYN) 500 MG TABLET    Take 1 tablet by mouth 2 times daily (with meals)    SEMAGLUTIDE 3 MG TABS    Take 3 mg by mouth daily    SIMVASTATIN (ZOCOR) 40 MG TABLET    Take 40 mg by mouth nightly              (Please note that portions of this note were completed with a voice recognition program.  Efforts were made to edit the dictations but occasionally words are mis-transcribed.)    Amalia Carranza MD (electronically signed)       Amalia Carranza MD  01/11/23 Neymar Colon MD  01/11/23 7893

## 2023-01-14 LAB — BLOOD CULTURE, ROUTINE: NORMAL

## 2023-02-28 NOTE — ACP (ADVANCE CARE PLANNING)
Advance Care Planning     Advance Care Planning Activator (Inpatient)  Conversation Note      Date of ACP Conversation: 11/22/2021     Conversation Conducted with: Patient with Decision Making Capacity    ACP Activator[facility] Decision Maker:     Current Designated Health Care Decision Maker:     Primary Decision Maker: Frantz Rodriguez - Spouse - [phone]    Today we documented Decision Maker(s) consistent with Legal Next of Kin hierarchy. Care Preferences    Ventilation: \"If you were in your present state of health and suddenly became very ill and were unable to breathe on your own, what would your preference be about the use of a ventilator (breathing machine) if it were available to you? \"      Would the patient desire the use of ventilator (breathing machine)?: yes    \"If your health worsens and it becomes clear that your chance of recovery is unlikely, what would your preference be about the use of a ventilator (breathing machine) if it were available to you? \"     Would the patient desire the use of ventilator (breathing machine)?:   \"I would leave this decision up to my . \"      Resuscitation  \"CPR works best to restart the heart when there is a sudden event, like a heart attack, in someone who is otherwise healthy. Unfortunately, CPR does not typically restart the heart for people who have serious health conditions or who are very sick. \"    \"In the event your heart stopped as a result of an underlying serious health condition, would you want attempts to be made to restart your heart (answer \"yes\" for attempt to resuscitate) or would you prefer a natural death (answer \"no\" for do not attempt to resuscitate)? \" yes       [] Yes   [x] No   Educated Patient / Igor Duffy regarding differences between Advance Directives and portable DNR orders.     Length of ACP Conversation in minutes:  5  Conversation Outcomes:  [x] ACP discussion completed  [] Existing advance directive reviewed with patient; no changes to patient's previously recorded wishes  [] New Advance Directive completed  [] Portable Do Not Rescitate prepared for Provider review and signature  [] POLST/POST/MOLST/MOST prepared for Provider review and signature      Follow-up plan:    [] Schedule follow-up conversation to continue planning  [x] Referred individual to Provider for additional questions/concerns   [] Advised patient/agent/surrogate to review completed ACP document and update if needed with changes in condition, patient preferences or care setting    [] This note routed to one or more involved healthcare providers    [facility]  [phone]  Electronically signed by Heladio Chahal on 11/22/2021 at 10:48 AM Retention Suture Bite Size: 3 mm

## (undated) DEVICE — SYRINGE MED 10ML LUERLOCK TIP W/O SFTY DISP

## (undated) DEVICE — DRESSING,GAUZE,XEROFORM,CURAD,1"X8",ST: Brand: CURAD

## (undated) DEVICE — IMMOBILIZER SHLDR M L8X16.5IN R/L CANVS W/ WAIST STRP THMB

## (undated) DEVICE — DRAPE,U/SHT,SPLIT,FILM,60X84,STERILE: Brand: MEDLINE

## (undated) DEVICE — KIT SHLDR STBL MARCO FOR SPIDER LIMB POS

## (undated) DEVICE — Z DISCONTINUED USE 2272117 DRAPE SURG 3 QTR N INVASIVE 2 LAYR DISP

## (undated) DEVICE — Z DISCONTINUED USE 2716304 SUTURE STRATAFIX SPRL SZ 3-0 L12IN ABSRB UD FS-1 L24MM 3/8

## (undated) DEVICE — BIT DRL L110MM DIA2.5MM G QUIK CPL W/O STP REUSE

## (undated) DEVICE — PAD,ABDOMINAL,8"X7.5",STERILE,LF,1/PK: Brand: MEDLINE

## (undated) DEVICE — SUTURE MCRYL SZ 4-0 L18IN ABSRB UD L19MM PS-2 3/8 CIR PRIM Y496G

## (undated) DEVICE — SPONGE GZ W4XL4IN COT 12 PLY TYP VII WVN C FLD DSGN

## (undated) DEVICE — GLOVE SURG SZ 8 CRM LTX FREE POLYISOPRENE POLYMER BEAD ANTI

## (undated) DEVICE — 3M™ STERI-DRAPE™ U-DRAPE 1015: Brand: STERI-DRAPE™

## (undated) DEVICE — INTENDED TO SUPPORT AND MAINTAIN THE POSITION OF AN ANESTHETIZED PATIENT DURING SURGERY: Brand: ERIN BEACH CHAIR FACE MASK

## (undated) DEVICE — GLOVE SURG SZ 65 THK91MIL LTX FREE SYN POLYISOPRENE

## (undated) DEVICE — C-ARM: Brand: UNBRANDED

## (undated) DEVICE — GLOVE SURG SZ 65 L12IN FNGR THK79MIL GRN LTX FREE

## (undated) DEVICE — BIT DRL L200MM DIA2.8MM CALIB L100MM FOR 3.5MM VA LCP PROX

## (undated) DEVICE — SPONGE LAP W18XL18IN WHT COT 4 PLY FLD STRUNG RADPQ DISP ST

## (undated) DEVICE — MAJOR SET UP: Brand: MEDLINE INDUSTRIES, INC.

## (undated) DEVICE — SUTURE VCRL SZ 3-0 L18IN ABSRB UD L26MM SH 1/2 CIR J864D

## (undated) DEVICE — HYPODERMIC SAFETY NEEDLE: Brand: MAGELLAN

## (undated) DEVICE — SUTURE STRATAFIX SPRL SZ 2 0 L14IN ABSRB UD MH L36MM 1 2 CIR SXMD2B401

## (undated) DEVICE — PAD,NON-ADHERENT,3X8,STERILE,LF,1/PK: Brand: MEDLINE

## (undated) DEVICE — 3M™ STERI-STRIP™ COMPOUND BENZOIN TINCTURE 40 BAGS/CARTON 4 CARTONS/CASE C1544: Brand: 3M™ STERI-STRIP™

## (undated) DEVICE — BASIC SINGLE BASIN 1-LF: Brand: MEDLINE INDUSTRIES, INC.

## (undated) DEVICE — SUTURE VCRL SZ 0 L18IN ABSRB UD L36MM CT-1 1/2 CIR J840D

## (undated) DEVICE — STRIP,CLOSURE,WOUND,MEDI-STRIP,1/2X4: Brand: MEDLINE

## (undated) DEVICE — BANDAGE COBAN 6 IN WND 6INX5YD FOAM

## (undated) DEVICE — Z DUP USE 2701075 SYSTEM SKIN CLSR 42CM DERMBND PRINEO

## (undated) DEVICE — SUTURE VCRL SZ 2-0 L18IN ABSRB UD CT-1 L36MM 1/2 CIR J839D

## (undated) DEVICE — 3M™ IOBAN™ 2 ANTIMICROBIAL INCISE DRAPE 6650EZ: Brand: IOBAN™ 2

## (undated) DEVICE — SOLUTION IV IRRIG POUR BRL 0.9% SODIUM CHL 2F7124

## (undated) DEVICE — BIT DRL L110MM DIA3.5MM QUIK CPL W/O STP REUSE

## (undated) DEVICE — STOCKINETTE,IMPERVIOUS,12X48,STERILE: Brand: MEDLINE

## (undated) DEVICE — DRAPE,SPLIT ,77X120: Brand: MEDLINE

## (undated) DEVICE — K WIRE FIX L150MM DIA1.6MM S STL TRCR PNT
Type: IMPLANTABLE DEVICE | Site: ARM | Status: NON-FUNCTIONAL
Removed: 2021-11-20